# Patient Record
Sex: MALE | Race: WHITE | NOT HISPANIC OR LATINO | URBAN - METROPOLITAN AREA
[De-identification: names, ages, dates, MRNs, and addresses within clinical notes are randomized per-mention and may not be internally consistent; named-entity substitution may affect disease eponyms.]

---

## 2018-04-18 ENCOUNTER — OUTPATIENT (OUTPATIENT)
Dept: OUTPATIENT SERVICES | Facility: HOSPITAL | Age: 83
LOS: 1 days | Discharge: HOME | End: 2018-04-18

## 2018-04-18 DIAGNOSIS — N18.2 CHRONIC KIDNEY DISEASE, STAGE 2 (MILD): ICD-10-CM

## 2018-04-18 DIAGNOSIS — E11.9 TYPE 2 DIABETES MELLITUS WITHOUT COMPLICATIONS: ICD-10-CM

## 2018-04-18 DIAGNOSIS — E55.9 VITAMIN D DEFICIENCY, UNSPECIFIED: ICD-10-CM

## 2018-04-18 DIAGNOSIS — D64.9 ANEMIA, UNSPECIFIED: ICD-10-CM

## 2018-04-18 DIAGNOSIS — N39.0 URINARY TRACT INFECTION, SITE NOT SPECIFIED: ICD-10-CM

## 2018-08-01 ENCOUNTER — EMERGENCY (EMERGENCY)
Facility: HOSPITAL | Age: 83
LOS: 0 days | Discharge: HOME | End: 2018-08-01
Attending: EMERGENCY MEDICINE | Admitting: EMERGENCY MEDICINE
Payer: COMMERCIAL

## 2018-08-01 VITALS
TEMPERATURE: 98 F | HEART RATE: 60 BPM | RESPIRATION RATE: 18 BRPM | DIASTOLIC BLOOD PRESSURE: 74 MMHG | SYSTOLIC BLOOD PRESSURE: 187 MMHG | OXYGEN SATURATION: 98 %

## 2018-08-01 VITALS
OXYGEN SATURATION: 99 % | RESPIRATION RATE: 18 BRPM | SYSTOLIC BLOOD PRESSURE: 164 MMHG | DIASTOLIC BLOOD PRESSURE: 76 MMHG | TEMPERATURE: 99 F | HEART RATE: 60 BPM

## 2018-08-01 DIAGNOSIS — Z96.641 PRESENCE OF RIGHT ARTIFICIAL HIP JOINT: Chronic | ICD-10-CM

## 2018-08-01 DIAGNOSIS — Z79.899 OTHER LONG TERM (CURRENT) DRUG THERAPY: ICD-10-CM

## 2018-08-01 DIAGNOSIS — E11.9 TYPE 2 DIABETES MELLITUS WITHOUT COMPLICATIONS: ICD-10-CM

## 2018-08-01 DIAGNOSIS — Z88.8 ALLERGY STATUS TO OTHER DRUGS, MEDICAMENTS AND BIOLOGICAL SUBSTANCES: ICD-10-CM

## 2018-08-01 DIAGNOSIS — S06.5X9A TRAUMATIC SUBDURAL HEMORRHAGE WITH LOSS OF CONSCIOUSNESS OF UNSPECIFIED DURATION, INITIAL ENCOUNTER: Chronic | ICD-10-CM

## 2018-08-01 DIAGNOSIS — I10 ESSENTIAL (PRIMARY) HYPERTENSION: ICD-10-CM

## 2018-08-01 DIAGNOSIS — M79.662 PAIN IN LEFT LOWER LEG: ICD-10-CM

## 2018-08-01 DIAGNOSIS — R53.1 WEAKNESS: ICD-10-CM

## 2018-08-01 DIAGNOSIS — Z98.890 OTHER SPECIFIED POSTPROCEDURAL STATES: Chronic | ICD-10-CM

## 2018-08-01 DIAGNOSIS — M79.652 PAIN IN LEFT THIGH: ICD-10-CM

## 2018-08-01 DIAGNOSIS — Z79.84 LONG TERM (CURRENT) USE OF ORAL HYPOGLYCEMIC DRUGS: ICD-10-CM

## 2018-08-01 DIAGNOSIS — M79.661 PAIN IN RIGHT LOWER LEG: ICD-10-CM

## 2018-08-01 DIAGNOSIS — Z96.641 PRESENCE OF RIGHT ARTIFICIAL HIP JOINT: ICD-10-CM

## 2018-08-01 DIAGNOSIS — Z98.890 OTHER SPECIFIED POSTPROCEDURAL STATES: ICD-10-CM

## 2018-08-01 LAB
ALBUMIN SERPL ELPH-MCNC: 3.4 G/DL — LOW (ref 3.5–5.2)
ALBUMIN SERPL ELPH-MCNC: 4.4 G/DL — SIGNIFICANT CHANGE UP (ref 3.5–5.2)
ALP SERPL-CCNC: 100 U/L — SIGNIFICANT CHANGE UP (ref 30–115)
ALP SERPL-CCNC: 80 U/L — SIGNIFICANT CHANGE UP (ref 30–115)
ALT FLD-CCNC: 16 U/L — SIGNIFICANT CHANGE UP (ref 0–41)
ALT FLD-CCNC: 21 U/L — SIGNIFICANT CHANGE UP (ref 0–41)
ANION GAP SERPL CALC-SCNC: 10 MMOL/L — SIGNIFICANT CHANGE UP (ref 7–14)
ANION GAP SERPL CALC-SCNC: 15 MMOL/L — HIGH (ref 7–14)
ANION GAP SERPL CALC-SCNC: 16 MMOL/L — HIGH (ref 7–14)
APTT BLD: 23.7 SEC — CRITICAL LOW (ref 27–39.2)
AST SERPL-CCNC: 16 U/L — SIGNIFICANT CHANGE UP (ref 0–41)
AST SERPL-CCNC: 38 U/L — SIGNIFICANT CHANGE UP (ref 0–41)
BASE EXCESS BLDV CALC-SCNC: -9.1 MMOL/L — LOW (ref -2–2)
BASOPHILS # BLD AUTO: 0.03 K/UL — SIGNIFICANT CHANGE UP (ref 0–0.2)
BASOPHILS NFR BLD AUTO: 0.4 % — SIGNIFICANT CHANGE UP (ref 0–1)
BILIRUB SERPL-MCNC: 0.3 MG/DL — SIGNIFICANT CHANGE UP (ref 0.2–1.2)
BILIRUB SERPL-MCNC: <0.2 MG/DL — SIGNIFICANT CHANGE UP (ref 0.2–1.2)
BUN SERPL-MCNC: 11 MG/DL — SIGNIFICANT CHANGE UP (ref 10–20)
BUN SERPL-MCNC: 21 MG/DL — HIGH (ref 10–20)
BUN SERPL-MCNC: 22 MG/DL — HIGH (ref 10–20)
CA-I SERPL-SCNC: 0.82 MMOL/L — LOW (ref 1.12–1.3)
CALCIUM SERPL-MCNC: 3.3 MG/DL — CRITICAL LOW (ref 8.5–10.1)
CALCIUM SERPL-MCNC: 7.8 MG/DL — LOW (ref 8.5–10.1)
CALCIUM SERPL-MCNC: 8.7 MG/DL — SIGNIFICANT CHANGE UP (ref 8.5–10.1)
CHLORIDE SERPL-SCNC: 103 MMOL/L — SIGNIFICANT CHANGE UP (ref 98–110)
CHLORIDE SERPL-SCNC: 122 MMOL/L — HIGH (ref 98–110)
CHLORIDE SERPL-SCNC: 99 MMOL/L — SIGNIFICANT CHANGE UP (ref 98–110)
CO2 SERPL-SCNC: 11 MMOL/L — LOW (ref 17–32)
CO2 SERPL-SCNC: 19 MMOL/L — SIGNIFICANT CHANGE UP (ref 17–32)
CO2 SERPL-SCNC: 22 MMOL/L — SIGNIFICANT CHANGE UP (ref 17–32)
CREAT SERPL-MCNC: 0.8 MG/DL — SIGNIFICANT CHANGE UP (ref 0.7–1.5)
CREAT SERPL-MCNC: 1 MG/DL — SIGNIFICANT CHANGE UP (ref 0.7–1.5)
CREAT SERPL-MCNC: <0.5 MG/DL — LOW (ref 0.7–1.5)
D DIMER BLD IA.RAPID-MCNC: 347 NG/ML DDU — HIGH (ref 0–230)
EOSINOPHIL # BLD AUTO: 0.04 K/UL — SIGNIFICANT CHANGE UP (ref 0–0.7)
EOSINOPHIL NFR BLD AUTO: 0.6 % — SIGNIFICANT CHANGE UP (ref 0–8)
GAS PNL BLDA: SIGNIFICANT CHANGE UP
GAS PNL BLDV: 143 MMOL/L — SIGNIFICANT CHANGE UP (ref 136–145)
GAS PNL BLDV: SIGNIFICANT CHANGE UP
GLUCOSE SERPL-MCNC: 135 MG/DL — HIGH (ref 70–99)
GLUCOSE SERPL-MCNC: 150 MG/DL — HIGH (ref 70–99)
GLUCOSE SERPL-MCNC: 68 MG/DL — LOW (ref 70–99)
HCO3 BLDV-SCNC: 16 MMOL/L — LOW (ref 22–29)
HCT VFR BLD CALC: 39.2 % — LOW (ref 42–52)
HCT VFR BLDA CALC: 25.4 % — LOW (ref 34–44)
HGB BLD CALC-MCNC: 8.3 G/DL — LOW (ref 14–18)
HGB BLD-MCNC: 13.2 G/DL — LOW (ref 14–18)
IMM GRANULOCYTES NFR BLD AUTO: 1.2 % — HIGH (ref 0.1–0.3)
INR BLD: 1.07 RATIO — SIGNIFICANT CHANGE UP (ref 0.65–1.3)
LACTATE BLDV-MCNC: 1.3 MMOL/L — SIGNIFICANT CHANGE UP (ref 0.5–1.6)
LYMPHOCYTES # BLD AUTO: 1.52 K/UL — SIGNIFICANT CHANGE UP (ref 1.2–3.4)
LYMPHOCYTES # BLD AUTO: 22.5 % — SIGNIFICANT CHANGE UP (ref 20.5–51.1)
MAGNESIUM SERPL-MCNC: 1.9 MG/DL — SIGNIFICANT CHANGE UP (ref 1.8–2.4)
MCHC RBC-ENTMCNC: 32 PG — HIGH (ref 27–31)
MCHC RBC-ENTMCNC: 33.7 G/DL — SIGNIFICANT CHANGE UP (ref 32–37)
MCV RBC AUTO: 95.1 FL — HIGH (ref 80–94)
MONOCYTES # BLD AUTO: 0.76 K/UL — HIGH (ref 0.1–0.6)
MONOCYTES NFR BLD AUTO: 11.2 % — HIGH (ref 1.7–9.3)
NEUTROPHILS # BLD AUTO: 4.34 K/UL — SIGNIFICANT CHANGE UP (ref 1.4–6.5)
NEUTROPHILS NFR BLD AUTO: 64.1 % — SIGNIFICANT CHANGE UP (ref 42.2–75.2)
NRBC # BLD: 0 /100 WBCS — SIGNIFICANT CHANGE UP (ref 0–0)
PCO2 BLDV: 30 MMHG — LOW (ref 41–51)
PH BLDV: 7.33 — SIGNIFICANT CHANGE UP (ref 7.26–7.43)
PLATELET # BLD AUTO: 165 K/UL — SIGNIFICANT CHANGE UP (ref 130–400)
PO2 BLDV: 23 MMHG — SIGNIFICANT CHANGE UP (ref 20–40)
POTASSIUM BLDV-SCNC: 2.7 MMOL/L — LOW (ref 3.3–5.6)
POTASSIUM SERPL-MCNC: 2.3 MMOL/L — CRITICAL LOW (ref 3.5–5)
POTASSIUM SERPL-MCNC: 4.6 MMOL/L — SIGNIFICANT CHANGE UP (ref 3.5–5)
POTASSIUM SERPL-MCNC: 6.2 MMOL/L — CRITICAL HIGH (ref 3.5–5)
POTASSIUM SERPL-SCNC: 2.3 MMOL/L — CRITICAL LOW (ref 3.5–5)
POTASSIUM SERPL-SCNC: 4.6 MMOL/L — SIGNIFICANT CHANGE UP (ref 3.5–5)
POTASSIUM SERPL-SCNC: 6.2 MMOL/L — CRITICAL HIGH (ref 3.5–5)
PROT SERPL-MCNC: 5.5 G/DL — LOW (ref 6–8)
PROT SERPL-MCNC: 7.3 G/DL — SIGNIFICANT CHANGE UP (ref 6–8)
PROTHROM AB SERPL-ACNC: 11.5 SEC — SIGNIFICANT CHANGE UP (ref 9.95–12.87)
RBC # BLD: 4.12 M/UL — LOW (ref 4.7–6.1)
RBC # FLD: 12.6 % — SIGNIFICANT CHANGE UP (ref 11.5–14.5)
SAO2 % BLDV: 32 % — SIGNIFICANT CHANGE UP
SODIUM SERPL-SCNC: 137 MMOL/L — SIGNIFICANT CHANGE UP (ref 135–146)
SODIUM SERPL-SCNC: 137 MMOL/L — SIGNIFICANT CHANGE UP (ref 135–146)
SODIUM SERPL-SCNC: 143 MMOL/L — SIGNIFICANT CHANGE UP (ref 135–146)
WBC # BLD: 6.77 K/UL — SIGNIFICANT CHANGE UP (ref 4.8–10.8)
WBC # FLD AUTO: 6.77 K/UL — SIGNIFICANT CHANGE UP (ref 4.8–10.8)

## 2018-08-01 PROCEDURE — 93970 EXTREMITY STUDY: CPT | Mod: 26

## 2018-08-01 RX ORDER — POTASSIUM CHLORIDE 20 MEQ
40 PACKET (EA) ORAL ONCE
Qty: 0 | Refills: 0 | Status: COMPLETED | OUTPATIENT
Start: 2018-08-01 | End: 2018-08-01

## 2018-08-01 RX ORDER — CARBAMAZEPINE 200 MG
0 TABLET ORAL
Qty: 0 | Refills: 0 | COMMUNITY

## 2018-08-01 RX ORDER — ATORVASTATIN CALCIUM 80 MG/1
1 TABLET, FILM COATED ORAL
Qty: 0 | Refills: 0 | COMMUNITY

## 2018-08-01 RX ORDER — SITAGLIPTIN 50 MG/1
0 TABLET, FILM COATED ORAL
Qty: 0 | Refills: 0 | COMMUNITY

## 2018-08-01 RX ORDER — POTASSIUM CHLORIDE 20 MEQ
20 PACKET (EA) ORAL ONCE
Qty: 0 | Refills: 0 | Status: DISCONTINUED | OUTPATIENT
Start: 2018-08-01 | End: 2018-08-01

## 2018-08-01 RX ORDER — CALCIUM GLUCONATE 100 MG/ML
2 VIAL (ML) INTRAVENOUS ONCE
Qty: 0 | Refills: 0 | Status: DISCONTINUED | OUTPATIENT
Start: 2018-08-01 | End: 2018-08-01

## 2018-08-01 RX ORDER — SODIUM CHLORIDE 9 MG/ML
1000 INJECTION INTRAMUSCULAR; INTRAVENOUS; SUBCUTANEOUS ONCE
Qty: 0 | Refills: 0 | Status: COMPLETED | OUTPATIENT
Start: 2018-08-01 | End: 2018-08-01

## 2018-08-01 RX ORDER — DIVALPROEX SODIUM 500 MG/1
0 TABLET, DELAYED RELEASE ORAL
Qty: 0 | Refills: 0 | COMMUNITY

## 2018-08-01 RX ORDER — CLOPIDOGREL BISULFATE 75 MG/1
1 TABLET, FILM COATED ORAL
Qty: 0 | Refills: 0 | COMMUNITY

## 2018-08-01 RX ORDER — MORPHINE SULFATE 50 MG/1
4 CAPSULE, EXTENDED RELEASE ORAL ONCE
Qty: 0 | Refills: 0 | Status: DISCONTINUED | OUTPATIENT
Start: 2018-08-01 | End: 2018-08-01

## 2018-08-01 RX ORDER — LISINOPRIL 2.5 MG/1
1 TABLET ORAL
Qty: 0 | Refills: 0 | COMMUNITY

## 2018-08-01 RX ORDER — ACETAMINOPHEN 500 MG
650 TABLET ORAL ONCE
Qty: 0 | Refills: 0 | Status: COMPLETED | OUTPATIENT
Start: 2018-08-01 | End: 2018-08-01

## 2018-08-01 RX ADMIN — SODIUM CHLORIDE 1000 MILLILITER(S): 9 INJECTION INTRAMUSCULAR; INTRAVENOUS; SUBCUTANEOUS at 01:45

## 2018-08-01 RX ADMIN — Medication 650 MILLIGRAM(S): at 10:38

## 2018-08-01 RX ADMIN — Medication 40 MILLIEQUIVALENT(S): at 04:53

## 2018-08-01 RX ADMIN — SODIUM CHLORIDE 1000 MILLILITER(S): 9 INJECTION INTRAMUSCULAR; INTRAVENOUS; SUBCUTANEOUS at 04:00

## 2018-08-01 NOTE — ED PROVIDER NOTE - NS ED ROS FT
Review of Systems:  	•	CONSTITUTIONAL - no fever, no diaphoresis, no chills  	•	SKIN - no rash  	•	HEMATOLOGIC - no bleeding, no bruising  	•	EYES - no eye pain, no blurry vision  	•	ENT - no congestion  	•	RESPIRATORY - no shortness of breath  	•	CARDIAC - no chest pain, no palpitations  	•	GI - no abd pain, no nausea, no vomiting  	•	GENITO-URINARY - no dysuria; no hematuria, no increased urinary frequency  	•	MUSCULOSKELETAL - + joint paint, no swelling, no redness  	•	NEUROLOGIC - no weakness, no headache, no paresthesias, no LOC

## 2018-08-01 NOTE — ED ADULT NURSE NOTE - PSH
H/O hernia repair Acute subdural hematoma  With surgical intervention  H/O hernia repair    History of total hip replacement, right

## 2018-08-01 NOTE — ED PROVIDER NOTE - ATTENDING CONTRIBUTION TO CARE
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 Reports yesterday had RLE pain, now LLE, upper thigh and also calf. No recent falls, rash, prolonged immobilization. Son reports history of clot though unclear if this is the case.     VS normal, exam without focal neuro deficit, ambulating with assistance at baseline, no signficant calf swelling but has subjective tenderness.     Will check labs including d dimer, give analgesia and reasess.

## 2018-08-01 NOTE — ED PROVIDER NOTE - PROGRESS NOTE DETAILS
Pt initially with hemolyzed K, repeat with hypokalemia, received PO K however after other labs resulted, noted that these did not seem appropriate so new labs drawn via arterial puncture, without hypokalemia, hypocalcemia. After tylenol, leg pain improved, however d dimer positive, will need LE duplex.    Clinically picture not entirely suggestive of DVT however patient's son initially reported history of DVT, though patient himself now denies. pts son refused morphine for him, given tylenol instead Case endorsed to Dr. Stevenson as per vascular tech, prelim negative for DVT

## 2018-08-01 NOTE — ED ADULT NURSE NOTE - PMH
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

## 2018-08-01 NOTE — ED PROVIDER NOTE - PSH
Acute subdural hematoma  With surgical intervention  H/O hernia repair    History of total hip replacement, right

## 2018-08-01 NOTE — ED PROVIDER NOTE - PHYSICAL EXAMINATION
VITAL SIGNS: I have reviewed nursing notes and confirm.  CONSTITUTIONAL: Well-developed; well-nourished; in no acute distress.  SKIN: Skin exam is warm and dry, no acute rash.  HEAD: Normocephalic; atraumatic.  EYES: PERRL, EOM intact; conjunctiva and sclera clear.  ENT: No nasal discharge; airway clear.   NECK: Supple; non tender.  CARD: S1, S2 normal; no murmurs, gallops, or rubs. Regular rate and rhythm. Capillary refill <2 sec.   RESP: Clear to auscultation bilaterally. No wheezes, rales or rhonchi.  ABD: Normal bowel sounds; soft; non-distended; non-tender.   EXT: Normal ROM. No edema. No calf tenderness. +Tenderness to left femur.   LYMPH: No acute cervical adenopathy.  NEURO: AAOx3. CN 2-12 grossly intact. +Contracted RUE (chronic). +Motor strength 5/5 bilateral LE. Motor strength 3/5 RUE. Motor strength 5/5 LUE. Radial pulses 2+ bilaterally. Dorsalis pedis pulses 2+ bilaterally.   PSYCH: Cooperative, appropriate.

## 2018-08-01 NOTE — ED ADULT NURSE NOTE - NSIMPLEMENTINTERV_GEN_ALL_ED
Implemented All Fall with Harm Risk Interventions:  [place] to call system. Call bell, personal items and telephone within reach. Instruct patient to call for assistance. Room bathroom lighting operational. Non-slip footwear when patient is off stretcher. Physically safe environment: no spills, clutter or unnecessary equipment. Stretcher in lowest position, wheels locked, appropriate side rails in place. Provide visual cue, wrist band, yellow gown, etc. Monitor gait and stability. Monitor for mental status changes and reorient to person, place, and time. Review medications for side effects contributing to fall risk. Reinforce activity limits and safety measures with patient and family. Provide visual clues: red socks.

## 2018-08-01 NOTE — ED PROVIDER NOTE - OBJECTIVE STATEMENT
87 yo M with pmhx of CVA, DM, SDH, epilepsy, HTN presenting with bilateral LE pain since last night. Patient states pain started at RLE and now is experiencing pain to LLE. Pain is sharp and intermittent and radiates to lower leg. Pt normally ambulates with walker with assistance - has 24hr home health aid. Patient has been taking Advil for pain. No trauma. No warmth, swelling or redness. No cp, sob, fever, chills, abdominal pain, nausea, vomiting, diarrhea, back pain, urinary symptoms, headache, dizziness, paresthesias, or weakness.

## 2018-08-01 NOTE — ED ADULT NURSE NOTE - OBJECTIVE STATEMENT
Patient reports having right upper leg pain the night before, and left upper leg pain in the morning, limiting ability to walk, denies any injuries.

## 2019-05-23 ENCOUNTER — OUTPATIENT (OUTPATIENT)
Dept: OUTPATIENT SERVICES | Facility: HOSPITAL | Age: 84
LOS: 1 days | Discharge: HOME | End: 2019-05-23

## 2019-05-23 DIAGNOSIS — Z98.890 OTHER SPECIFIED POSTPROCEDURAL STATES: Chronic | ICD-10-CM

## 2019-05-23 DIAGNOSIS — Z02.9 ENCOUNTER FOR ADMINISTRATIVE EXAMINATIONS, UNSPECIFIED: ICD-10-CM

## 2019-05-23 DIAGNOSIS — S06.5X9A TRAUMATIC SUBDURAL HEMORRHAGE WITH LOSS OF CONSCIOUSNESS OF UNSPECIFIED DURATION, INITIAL ENCOUNTER: Chronic | ICD-10-CM

## 2019-05-23 DIAGNOSIS — M79.652 PAIN IN LEFT THIGH: ICD-10-CM

## 2019-05-23 DIAGNOSIS — Z96.641 PRESENCE OF RIGHT ARTIFICIAL HIP JOINT: Chronic | ICD-10-CM

## 2019-05-23 DIAGNOSIS — I10 ESSENTIAL (PRIMARY) HYPERTENSION: ICD-10-CM

## 2019-05-23 DIAGNOSIS — R53.1 WEAKNESS: ICD-10-CM

## 2019-05-23 PROBLEM — Q89.9 CONGENITAL MALFORMATION, UNSPECIFIED: Chronic | Status: ACTIVE | Noted: 2018-08-01

## 2019-05-23 PROBLEM — E11.9 TYPE 2 DIABETES MELLITUS WITHOUT COMPLICATIONS: Chronic | Status: ACTIVE | Noted: 2018-08-01

## 2019-05-23 PROBLEM — R56.9 UNSPECIFIED CONVULSIONS: Chronic | Status: ACTIVE | Noted: 2018-08-01

## 2019-05-23 PROBLEM — I63.9 CEREBRAL INFARCTION, UNSPECIFIED: Chronic | Status: ACTIVE | Noted: 2018-08-01

## 2019-05-24 ENCOUNTER — OUTPATIENT (OUTPATIENT)
Dept: OUTPATIENT SERVICES | Facility: HOSPITAL | Age: 84
LOS: 1 days | Discharge: HOME | End: 2019-05-24

## 2019-05-24 DIAGNOSIS — S06.5X9A TRAUMATIC SUBDURAL HEMORRHAGE WITH LOSS OF CONSCIOUSNESS OF UNSPECIFIED DURATION, INITIAL ENCOUNTER: Chronic | ICD-10-CM

## 2019-05-24 DIAGNOSIS — Z98.890 OTHER SPECIFIED POSTPROCEDURAL STATES: Chronic | ICD-10-CM

## 2019-05-24 DIAGNOSIS — I10 ESSENTIAL (PRIMARY) HYPERTENSION: ICD-10-CM

## 2019-05-24 DIAGNOSIS — R53.1 WEAKNESS: ICD-10-CM

## 2019-05-24 DIAGNOSIS — Z96.641 PRESENCE OF RIGHT ARTIFICIAL HIP JOINT: Chronic | ICD-10-CM

## 2019-06-24 ENCOUNTER — OUTPATIENT (OUTPATIENT)
Dept: OUTPATIENT SERVICES | Facility: HOSPITAL | Age: 84
LOS: 1 days | Discharge: HOME | End: 2019-06-24

## 2019-06-24 DIAGNOSIS — D50.0 IRON DEFICIENCY ANEMIA SECONDARY TO BLOOD LOSS (CHRONIC): ICD-10-CM

## 2019-06-24 DIAGNOSIS — S06.5X9A TRAUMATIC SUBDURAL HEMORRHAGE WITH LOSS OF CONSCIOUSNESS OF UNSPECIFIED DURATION, INITIAL ENCOUNTER: Chronic | ICD-10-CM

## 2019-06-24 DIAGNOSIS — D51.9 VITAMIN B12 DEFICIENCY ANEMIA, UNSPECIFIED: ICD-10-CM

## 2019-06-24 DIAGNOSIS — Z98.890 OTHER SPECIFIED POSTPROCEDURAL STATES: Chronic | ICD-10-CM

## 2019-06-24 DIAGNOSIS — Z96.641 PRESENCE OF RIGHT ARTIFICIAL HIP JOINT: Chronic | ICD-10-CM

## 2019-06-24 DIAGNOSIS — E61.1 IRON DEFICIENCY: ICD-10-CM

## 2019-06-24 DIAGNOSIS — D64.9 ANEMIA, UNSPECIFIED: ICD-10-CM

## 2021-01-01 ENCOUNTER — INPATIENT (INPATIENT)
Facility: HOSPITAL | Age: 86
LOS: 5 days | Discharge: HOME HEALTH | End: 2021-03-21
Attending: INTERNAL MEDICINE | Admitting: INTERNAL MEDICINE
Payer: MEDICARE

## 2021-01-01 ENCOUNTER — INPATIENT (INPATIENT)
Facility: HOSPITAL | Age: 86
LOS: 11 days | End: 2021-04-02
Attending: HOSPITALIST | Admitting: HOSPITALIST
Payer: MEDICARE

## 2021-01-01 VITALS
RESPIRATION RATE: 20 BRPM | DIASTOLIC BLOOD PRESSURE: 63 MMHG | SYSTOLIC BLOOD PRESSURE: 147 MMHG | HEIGHT: 65 IN | TEMPERATURE: 101 F | OXYGEN SATURATION: 92 % | HEART RATE: 85 BPM

## 2021-01-01 VITALS — HEART RATE: 116 BPM | OXYGEN SATURATION: 95 %

## 2021-01-01 VITALS
HEIGHT: 65 IN | DIASTOLIC BLOOD PRESSURE: 65 MMHG | SYSTOLIC BLOOD PRESSURE: 128 MMHG | HEART RATE: 117 BPM | RESPIRATION RATE: 26 BRPM | OXYGEN SATURATION: 81 %

## 2021-01-01 VITALS
SYSTOLIC BLOOD PRESSURE: 148 MMHG | TEMPERATURE: 101 F | OXYGEN SATURATION: 97 % | DIASTOLIC BLOOD PRESSURE: 72 MMHG | HEART RATE: 76 BPM

## 2021-01-01 DIAGNOSIS — R09.02 HYPOXEMIA: ICD-10-CM

## 2021-01-01 DIAGNOSIS — I69.351 HEMIPLEGIA AND HEMIPARESIS FOLLOWING CEREBRAL INFARCTION AFFECTING RIGHT DOMINANT SIDE: ICD-10-CM

## 2021-01-01 DIAGNOSIS — I10 ESSENTIAL (PRIMARY) HYPERTENSION: ICD-10-CM

## 2021-01-01 DIAGNOSIS — G93.41 METABOLIC ENCEPHALOPATHY: ICD-10-CM

## 2021-01-01 DIAGNOSIS — Z74.01 BED CONFINEMENT STATUS: ICD-10-CM

## 2021-01-01 DIAGNOSIS — J96.01 ACUTE RESPIRATORY FAILURE WITH HYPOXIA: ICD-10-CM

## 2021-01-01 DIAGNOSIS — D64.9 ANEMIA, UNSPECIFIED: ICD-10-CM

## 2021-01-01 DIAGNOSIS — Z98.890 OTHER SPECIFIED POSTPROCEDURAL STATES: Chronic | ICD-10-CM

## 2021-01-01 DIAGNOSIS — R45.1 RESTLESSNESS AND AGITATION: ICD-10-CM

## 2021-01-01 DIAGNOSIS — A41.9 SEPSIS, UNSPECIFIED ORGANISM: ICD-10-CM

## 2021-01-01 DIAGNOSIS — R32 UNSPECIFIED URINARY INCONTINENCE: ICD-10-CM

## 2021-01-01 DIAGNOSIS — A41.51 SEPSIS DUE TO ESCHERICHIA COLI [E. COLI]: ICD-10-CM

## 2021-01-01 DIAGNOSIS — S06.5X9A TRAUMATIC SUBDURAL HEMORRHAGE WITH LOSS OF CONSCIOUSNESS OF UNSPECIFIED DURATION, INITIAL ENCOUNTER: Chronic | ICD-10-CM

## 2021-01-01 DIAGNOSIS — J12.82 PNEUMONIA DUE TO CORONAVIRUS DISEASE 2019: ICD-10-CM

## 2021-01-01 DIAGNOSIS — R06.00 DYSPNEA, UNSPECIFIED: ICD-10-CM

## 2021-01-01 DIAGNOSIS — Z79.84 LONG TERM (CURRENT) USE OF ORAL HYPOGLYCEMIC DRUGS: ICD-10-CM

## 2021-01-01 DIAGNOSIS — Z96.641 PRESENCE OF RIGHT ARTIFICIAL HIP JOINT: Chronic | ICD-10-CM

## 2021-01-01 DIAGNOSIS — U07.1 COVID-19: ICD-10-CM

## 2021-01-01 DIAGNOSIS — Z79.899 OTHER LONG TERM (CURRENT) DRUG THERAPY: ICD-10-CM

## 2021-01-01 DIAGNOSIS — Q68.8 OTHER SPECIFIED CONGENITAL MUSCULOSKELETAL DEFORMITIES: ICD-10-CM

## 2021-01-01 DIAGNOSIS — G40.909 EPILEPSY, UNSPECIFIED, NOT INTRACTABLE, WITHOUT STATUS EPILEPTICUS: ICD-10-CM

## 2021-01-01 DIAGNOSIS — N12 TUBULO-INTERSTITIAL NEPHRITIS, NOT SPECIFIED AS ACUTE OR CHRONIC: ICD-10-CM

## 2021-01-01 DIAGNOSIS — E11.9 TYPE 2 DIABETES MELLITUS WITHOUT COMPLICATIONS: ICD-10-CM

## 2021-01-01 DIAGNOSIS — R41.82 ALTERED MENTAL STATUS, UNSPECIFIED: ICD-10-CM

## 2021-01-01 DIAGNOSIS — R77.8 OTHER SPECIFIED ABNORMALITIES OF PLASMA PROTEINS: ICD-10-CM

## 2021-01-01 DIAGNOSIS — Z51.5 ENCOUNTER FOR PALLIATIVE CARE: ICD-10-CM

## 2021-01-01 DIAGNOSIS — E78.5 HYPERLIPIDEMIA, UNSPECIFIED: ICD-10-CM

## 2021-01-01 LAB
-  AMIKACIN: SIGNIFICANT CHANGE UP
-  AMOXICILLIN/CLAVULANIC ACID: SIGNIFICANT CHANGE UP
-  AMPICILLIN/SULBACTAM: SIGNIFICANT CHANGE UP
-  AMPICILLIN: SIGNIFICANT CHANGE UP
-  AZTREONAM: SIGNIFICANT CHANGE UP
-  CEFAZOLIN: SIGNIFICANT CHANGE UP
-  CEFEPIME: SIGNIFICANT CHANGE UP
-  CEFOXITIN: SIGNIFICANT CHANGE UP
-  CEFTRIAXONE: SIGNIFICANT CHANGE UP
-  CIPROFLOXACIN: SIGNIFICANT CHANGE UP
-  ERTAPENEM: SIGNIFICANT CHANGE UP
-  GENTAMICIN: SIGNIFICANT CHANGE UP
-  IMIPENEM: SIGNIFICANT CHANGE UP
-  LEVOFLOXACIN: SIGNIFICANT CHANGE UP
-  MEROPENEM: SIGNIFICANT CHANGE UP
-  NITROFURANTOIN: SIGNIFICANT CHANGE UP
-  PIPERACILLIN/TAZOBACTAM: SIGNIFICANT CHANGE UP
-  STAPHYLOCOCCUS EPIDERMIDIS, METHICILLIN RESISTANT: SIGNIFICANT CHANGE UP
-  TIGECYCLINE: SIGNIFICANT CHANGE UP
-  TOBRAMYCIN: SIGNIFICANT CHANGE UP
-  TRIMETHOPRIM/SULFAMETHOXAZOLE: SIGNIFICANT CHANGE UP
A1C WITH ESTIMATED AVERAGE GLUCOSE RESULT: 7.7 % — HIGH (ref 4–5.6)
ALBUMIN SERPL ELPH-MCNC: 1.9 G/DL — LOW (ref 3.5–5.2)
ALBUMIN SERPL ELPH-MCNC: 2 G/DL — LOW (ref 3.5–5.2)
ALBUMIN SERPL ELPH-MCNC: 2.1 G/DL — LOW (ref 3.5–5.2)
ALBUMIN SERPL ELPH-MCNC: 2.3 G/DL — LOW (ref 3.5–5.2)
ALBUMIN SERPL ELPH-MCNC: 2.4 G/DL — LOW (ref 3.5–5.2)
ALBUMIN SERPL ELPH-MCNC: 2.6 G/DL — LOW (ref 3.5–5.2)
ALBUMIN SERPL ELPH-MCNC: 3.7 G/DL — SIGNIFICANT CHANGE UP (ref 3.5–5.2)
ALP SERPL-CCNC: 115 U/L — SIGNIFICANT CHANGE UP (ref 30–115)
ALP SERPL-CCNC: 131 U/L — HIGH (ref 30–115)
ALP SERPL-CCNC: 149 U/L — HIGH (ref 30–115)
ALP SERPL-CCNC: 163 U/L — HIGH (ref 30–115)
ALP SERPL-CCNC: 297 U/L — HIGH (ref 30–115)
ALP SERPL-CCNC: 337 U/L — HIGH (ref 30–115)
ALP SERPL-CCNC: 353 U/L — HIGH (ref 30–115)
ALP SERPL-CCNC: 49 U/L — SIGNIFICANT CHANGE UP (ref 30–115)
ALP SERPL-CCNC: 52 U/L — SIGNIFICANT CHANGE UP (ref 30–115)
ALP SERPL-CCNC: 68 U/L — SIGNIFICANT CHANGE UP (ref 30–115)
ALP SERPL-CCNC: 83 U/L — SIGNIFICANT CHANGE UP (ref 30–115)
ALT FLD-CCNC: 15 U/L — SIGNIFICANT CHANGE UP (ref 0–41)
ALT FLD-CCNC: 16 U/L — SIGNIFICANT CHANGE UP (ref 0–41)
ALT FLD-CCNC: 17 U/L — SIGNIFICANT CHANGE UP (ref 0–41)
ALT FLD-CCNC: 17 U/L — SIGNIFICANT CHANGE UP (ref 0–41)
ALT FLD-CCNC: 21 U/L — SIGNIFICANT CHANGE UP (ref 0–41)
ALT FLD-CCNC: 23 U/L — SIGNIFICANT CHANGE UP (ref 0–41)
ALT FLD-CCNC: 24 U/L — SIGNIFICANT CHANGE UP (ref 0–41)
ALT FLD-CCNC: 26 U/L — SIGNIFICANT CHANGE UP (ref 0–41)
ALT FLD-CCNC: 27 U/L — SIGNIFICANT CHANGE UP (ref 0–41)
ALT FLD-CCNC: 28 U/L — SIGNIFICANT CHANGE UP (ref 0–41)
ALT FLD-CCNC: 36 U/L — SIGNIFICANT CHANGE UP (ref 0–41)
AMMONIA BLD-MCNC: 49 UMOL/L — SIGNIFICANT CHANGE UP (ref 11–55)
ANION GAP SERPL CALC-SCNC: 12 MMOL/L — SIGNIFICANT CHANGE UP (ref 7–14)
ANION GAP SERPL CALC-SCNC: 13 MMOL/L — SIGNIFICANT CHANGE UP (ref 7–14)
ANION GAP SERPL CALC-SCNC: 13 MMOL/L — SIGNIFICANT CHANGE UP (ref 7–14)
ANION GAP SERPL CALC-SCNC: 15 MMOL/L — HIGH (ref 7–14)
ANION GAP SERPL CALC-SCNC: 16 MMOL/L — HIGH (ref 7–14)
ANION GAP SERPL CALC-SCNC: 17 MMOL/L — HIGH (ref 7–14)
ANISOCYTOSIS BLD QL: SLIGHT — SIGNIFICANT CHANGE UP
APPEARANCE UR: ABNORMAL
APPEARANCE UR: CLEAR — SIGNIFICANT CHANGE UP
APTT BLD: 30 SEC — SIGNIFICANT CHANGE UP (ref 27–39.2)
APTT BLD: 37.1 SEC — SIGNIFICANT CHANGE UP (ref 27–39.2)
AST SERPL-CCNC: 34 U/L — SIGNIFICANT CHANGE UP (ref 0–41)
AST SERPL-CCNC: 39 U/L — SIGNIFICANT CHANGE UP (ref 0–41)
AST SERPL-CCNC: 40 U/L — SIGNIFICANT CHANGE UP (ref 0–41)
AST SERPL-CCNC: 41 U/L — SIGNIFICANT CHANGE UP (ref 0–41)
AST SERPL-CCNC: 43 U/L — HIGH (ref 0–41)
AST SERPL-CCNC: 45 U/L — HIGH (ref 0–41)
AST SERPL-CCNC: 49 U/L — HIGH (ref 0–41)
AST SERPL-CCNC: 59 U/L — HIGH (ref 0–41)
AST SERPL-CCNC: 70 U/L — HIGH (ref 0–41)
AST SERPL-CCNC: 77 U/L — HIGH (ref 0–41)
AST SERPL-CCNC: 82 U/L — HIGH (ref 0–41)
BACTERIA # UR AUTO: ABNORMAL
BACTERIA # UR AUTO: NEGATIVE — SIGNIFICANT CHANGE UP
BASE EXCESS BLDV CALC-SCNC: -1.9 MMOL/L — SIGNIFICANT CHANGE UP (ref -2–2)
BASOPHILS # BLD AUTO: 0 K/UL — SIGNIFICANT CHANGE UP (ref 0–0.2)
BASOPHILS # BLD AUTO: 0.01 K/UL — SIGNIFICANT CHANGE UP (ref 0–0.2)
BASOPHILS # BLD AUTO: 0.02 K/UL — SIGNIFICANT CHANGE UP (ref 0–0.2)
BASOPHILS # BLD AUTO: 0.05 K/UL — SIGNIFICANT CHANGE UP (ref 0–0.2)
BASOPHILS NFR BLD AUTO: 0 % — SIGNIFICANT CHANGE UP (ref 0–1)
BASOPHILS NFR BLD AUTO: 0.1 % — SIGNIFICANT CHANGE UP (ref 0–1)
BASOPHILS NFR BLD AUTO: 0.2 % — SIGNIFICANT CHANGE UP (ref 0–1)
BASOPHILS NFR BLD AUTO: 0.9 % — SIGNIFICANT CHANGE UP (ref 0–1)
BILIRUB SERPL-MCNC: 0.3 MG/DL — SIGNIFICANT CHANGE UP (ref 0.2–1.2)
BILIRUB SERPL-MCNC: 0.5 MG/DL — SIGNIFICANT CHANGE UP (ref 0.2–1.2)
BILIRUB SERPL-MCNC: 0.6 MG/DL — SIGNIFICANT CHANGE UP (ref 0.2–1.2)
BILIRUB SERPL-MCNC: 0.7 MG/DL — SIGNIFICANT CHANGE UP (ref 0.2–1.2)
BILIRUB SERPL-MCNC: 0.7 MG/DL — SIGNIFICANT CHANGE UP (ref 0.2–1.2)
BILIRUB SERPL-MCNC: 0.8 MG/DL — SIGNIFICANT CHANGE UP (ref 0.2–1.2)
BILIRUB SERPL-MCNC: <0.2 MG/DL — SIGNIFICANT CHANGE UP (ref 0.2–1.2)
BILIRUB UR-MCNC: NEGATIVE — SIGNIFICANT CHANGE UP
BILIRUB UR-MCNC: NEGATIVE — SIGNIFICANT CHANGE UP
BLD GP AB SCN SERPL QL: SIGNIFICANT CHANGE UP
BLD GP AB SCN SERPL QL: SIGNIFICANT CHANGE UP
BUN SERPL-MCNC: 23 MG/DL — HIGH (ref 10–20)
BUN SERPL-MCNC: 24 MG/DL — HIGH (ref 10–20)
BUN SERPL-MCNC: 24 MG/DL — HIGH (ref 10–20)
BUN SERPL-MCNC: 30 MG/DL — HIGH (ref 10–20)
BUN SERPL-MCNC: 31 MG/DL — HIGH (ref 10–20)
BUN SERPL-MCNC: 34 MG/DL — HIGH (ref 10–20)
BUN SERPL-MCNC: 35 MG/DL — HIGH (ref 10–20)
BUN SERPL-MCNC: 55 MG/DL — HIGH (ref 10–20)
BUN SERPL-MCNC: 62 MG/DL — CRITICAL HIGH (ref 10–20)
BUN SERPL-MCNC: 68 MG/DL — CRITICAL HIGH (ref 10–20)
BUN SERPL-MCNC: 69 MG/DL — CRITICAL HIGH (ref 10–20)
CA-I SERPL-SCNC: 1.12 MMOL/L — SIGNIFICANT CHANGE UP (ref 1.12–1.3)
CALCIUM SERPL-MCNC: 7.2 MG/DL — LOW (ref 8.5–10.1)
CALCIUM SERPL-MCNC: 7.3 MG/DL — LOW (ref 8.5–10.1)
CALCIUM SERPL-MCNC: 7.4 MG/DL — LOW (ref 8.5–10.1)
CALCIUM SERPL-MCNC: 7.4 MG/DL — LOW (ref 8.5–10.1)
CALCIUM SERPL-MCNC: 7.5 MG/DL — LOW (ref 8.5–10.1)
CALCIUM SERPL-MCNC: 7.5 MG/DL — LOW (ref 8.5–10.1)
CALCIUM SERPL-MCNC: 7.6 MG/DL — LOW (ref 8.5–10.1)
CALCIUM SERPL-MCNC: 7.6 MG/DL — LOW (ref 8.5–10.1)
CALCIUM SERPL-MCNC: 7.7 MG/DL — LOW (ref 8.5–10.1)
CALCIUM SERPL-MCNC: 7.7 MG/DL — LOW (ref 8.5–10.1)
CALCIUM SERPL-MCNC: 8.9 MG/DL — SIGNIFICANT CHANGE UP (ref 8.5–10.1)
CARBAMAZEPINE SERPL-MCNC: 10.9 UG/ML — SIGNIFICANT CHANGE UP (ref 4–12)
CARBAMAZEPINE SERPL-MCNC: 6.9 UG/ML — SIGNIFICANT CHANGE UP (ref 4–12)
CHLORIDE SERPL-SCNC: 106 MMOL/L — SIGNIFICANT CHANGE UP (ref 98–110)
CHLORIDE SERPL-SCNC: 108 MMOL/L — SIGNIFICANT CHANGE UP (ref 98–110)
CHLORIDE SERPL-SCNC: 108 MMOL/L — SIGNIFICANT CHANGE UP (ref 98–110)
CHLORIDE SERPL-SCNC: 109 MMOL/L — SIGNIFICANT CHANGE UP (ref 98–110)
CHLORIDE SERPL-SCNC: 112 MMOL/L — HIGH (ref 98–110)
CHLORIDE SERPL-SCNC: 114 MMOL/L — HIGH (ref 98–110)
CHLORIDE SERPL-SCNC: 114 MMOL/L — HIGH (ref 98–110)
CHLORIDE SERPL-SCNC: 115 MMOL/L — HIGH (ref 98–110)
CHLORIDE SERPL-SCNC: 118 MMOL/L — HIGH (ref 98–110)
CHLORIDE SERPL-SCNC: 119 MMOL/L — HIGH (ref 98–110)
CHLORIDE SERPL-SCNC: 98 MMOL/L — SIGNIFICANT CHANGE UP (ref 98–110)
CO2 SERPL-SCNC: 16 MMOL/L — LOW (ref 17–32)
CO2 SERPL-SCNC: 16 MMOL/L — LOW (ref 17–32)
CO2 SERPL-SCNC: 17 MMOL/L — SIGNIFICANT CHANGE UP (ref 17–32)
CO2 SERPL-SCNC: 17 MMOL/L — SIGNIFICANT CHANGE UP (ref 17–32)
CO2 SERPL-SCNC: 18 MMOL/L — SIGNIFICANT CHANGE UP (ref 17–32)
CO2 SERPL-SCNC: 19 MMOL/L — SIGNIFICANT CHANGE UP (ref 17–32)
CO2 SERPL-SCNC: 20 MMOL/L — SIGNIFICANT CHANGE UP (ref 17–32)
CO2 SERPL-SCNC: 21 MMOL/L — SIGNIFICANT CHANGE UP (ref 17–32)
CO2 SERPL-SCNC: 21 MMOL/L — SIGNIFICANT CHANGE UP (ref 17–32)
CO2 SERPL-SCNC: 22 MMOL/L — SIGNIFICANT CHANGE UP (ref 17–32)
CO2 SERPL-SCNC: 22 MMOL/L — SIGNIFICANT CHANGE UP (ref 17–32)
COLOR SPEC: YELLOW — SIGNIFICANT CHANGE UP
COLOR SPEC: YELLOW — SIGNIFICANT CHANGE UP
COVID-19 SPIKE DOMAIN AB INTERP: NEGATIVE — SIGNIFICANT CHANGE UP
COVID-19 SPIKE DOMAIN ANTIBODY RESULT: 0.4 U/ML — SIGNIFICANT CHANGE UP
CREAT SERPL-MCNC: 0.8 MG/DL — SIGNIFICANT CHANGE UP (ref 0.7–1.5)
CREAT SERPL-MCNC: 0.8 MG/DL — SIGNIFICANT CHANGE UP (ref 0.7–1.5)
CREAT SERPL-MCNC: 1 MG/DL — SIGNIFICANT CHANGE UP (ref 0.7–1.5)
CREAT SERPL-MCNC: 1 MG/DL — SIGNIFICANT CHANGE UP (ref 0.7–1.5)
CREAT SERPL-MCNC: 1.1 MG/DL — SIGNIFICANT CHANGE UP (ref 0.7–1.5)
CREAT SERPL-MCNC: 1.1 MG/DL — SIGNIFICANT CHANGE UP (ref 0.7–1.5)
CREAT SERPL-MCNC: 1.5 MG/DL — SIGNIFICANT CHANGE UP (ref 0.7–1.5)
CREAT SERPL-MCNC: 1.6 MG/DL — HIGH (ref 0.7–1.5)
CREAT SERPL-MCNC: 1.9 MG/DL — HIGH (ref 0.7–1.5)
CRP SERPL-MCNC: 283 MG/L — HIGH
CRP SERPL-MCNC: 296 MG/L — HIGH
CRP SERPL-MCNC: 301 MG/L — HIGH
CRP SERPL-MCNC: 363 MG/L — HIGH
CULTURE RESULTS: NO GROWTH — SIGNIFICANT CHANGE UP
CULTURE RESULTS: SIGNIFICANT CHANGE UP
D DIMER BLD IA.RAPID-MCNC: 400 NG/ML DDU — HIGH (ref 0–230)
D DIMER BLD IA.RAPID-MCNC: 5119 NG/ML DDU — HIGH (ref 0–230)
D DIMER BLD IA.RAPID-MCNC: 646 NG/ML DDU — HIGH (ref 0–230)
DIFF PNL FLD: ABNORMAL
DIFF PNL FLD: SIGNIFICANT CHANGE UP
EOSINOPHIL # BLD AUTO: 0 K/UL — SIGNIFICANT CHANGE UP (ref 0–0.7)
EOSINOPHIL # BLD AUTO: 0.01 K/UL — SIGNIFICANT CHANGE UP (ref 0–0.7)
EOSINOPHIL # BLD AUTO: 0.03 K/UL — SIGNIFICANT CHANGE UP (ref 0–0.7)
EOSINOPHIL NFR BLD AUTO: 0 % — SIGNIFICANT CHANGE UP (ref 0–8)
EOSINOPHIL NFR BLD AUTO: 0.1 % — SIGNIFICANT CHANGE UP (ref 0–8)
EOSINOPHIL NFR BLD AUTO: 0.1 % — SIGNIFICANT CHANGE UP (ref 0–8)
EOSINOPHIL NFR BLD AUTO: 0.2 % — SIGNIFICANT CHANGE UP (ref 0–8)
EOSINOPHIL NFR BLD AUTO: 0.2 % — SIGNIFICANT CHANGE UP (ref 0–8)
EOSINOPHIL NFR BLD AUTO: 0.3 % — SIGNIFICANT CHANGE UP (ref 0–8)
EPI CELLS # UR: 1 /HPF — SIGNIFICANT CHANGE UP (ref 0–5)
EPI CELLS # UR: 1 /HPF — SIGNIFICANT CHANGE UP (ref 0–5)
ERYTHROCYTE [SEDIMENTATION RATE] IN BLOOD: 66 MM/HR — HIGH (ref 0–10)
ESTIMATED AVERAGE GLUCOSE: 174 MG/DL — HIGH (ref 68–114)
FERRITIN SERPL-MCNC: 750 NG/ML — HIGH (ref 30–400)
FERRITIN SERPL-MCNC: 882 NG/ML — HIGH (ref 30–400)
GAS PNL BLDA: SIGNIFICANT CHANGE UP
GAS PNL BLDV: 140 MMOL/L — SIGNIFICANT CHANGE UP (ref 136–145)
GAS PNL BLDV: SIGNIFICANT CHANGE UP
GAS PNL BLDV: SIGNIFICANT CHANGE UP
GIANT PLATELETS BLD QL SMEAR: PRESENT — SIGNIFICANT CHANGE UP
GLUCOSE BLDC GLUCOMTR-MCNC: 103 MG/DL — HIGH (ref 70–99)
GLUCOSE BLDC GLUCOMTR-MCNC: 106 MG/DL — HIGH (ref 70–99)
GLUCOSE BLDC GLUCOMTR-MCNC: 11 MG/DL — CRITICAL LOW (ref 70–99)
GLUCOSE BLDC GLUCOMTR-MCNC: 110 MG/DL — HIGH (ref 70–99)
GLUCOSE BLDC GLUCOMTR-MCNC: 119 MG/DL — HIGH (ref 70–99)
GLUCOSE BLDC GLUCOMTR-MCNC: 124 MG/DL — HIGH (ref 70–99)
GLUCOSE BLDC GLUCOMTR-MCNC: 132 MG/DL — HIGH (ref 70–99)
GLUCOSE BLDC GLUCOMTR-MCNC: 132 MG/DL — HIGH (ref 70–99)
GLUCOSE BLDC GLUCOMTR-MCNC: 133 MG/DL — HIGH (ref 70–99)
GLUCOSE BLDC GLUCOMTR-MCNC: 134 MG/DL — HIGH (ref 70–99)
GLUCOSE BLDC GLUCOMTR-MCNC: 134 MG/DL — HIGH (ref 70–99)
GLUCOSE BLDC GLUCOMTR-MCNC: 139 MG/DL — HIGH (ref 70–99)
GLUCOSE BLDC GLUCOMTR-MCNC: 140 MG/DL — HIGH (ref 70–99)
GLUCOSE BLDC GLUCOMTR-MCNC: 140 MG/DL — HIGH (ref 70–99)
GLUCOSE BLDC GLUCOMTR-MCNC: 146 MG/DL — HIGH (ref 70–99)
GLUCOSE BLDC GLUCOMTR-MCNC: 150 MG/DL — HIGH (ref 70–99)
GLUCOSE BLDC GLUCOMTR-MCNC: 158 MG/DL — HIGH (ref 70–99)
GLUCOSE BLDC GLUCOMTR-MCNC: 160 MG/DL — HIGH (ref 70–99)
GLUCOSE BLDC GLUCOMTR-MCNC: 164 MG/DL — HIGH (ref 70–99)
GLUCOSE BLDC GLUCOMTR-MCNC: 169 MG/DL — HIGH (ref 70–99)
GLUCOSE BLDC GLUCOMTR-MCNC: 173 MG/DL — HIGH (ref 70–99)
GLUCOSE BLDC GLUCOMTR-MCNC: 176 MG/DL — HIGH (ref 70–99)
GLUCOSE BLDC GLUCOMTR-MCNC: 177 MG/DL — HIGH (ref 70–99)
GLUCOSE BLDC GLUCOMTR-MCNC: 177 MG/DL — HIGH (ref 70–99)
GLUCOSE BLDC GLUCOMTR-MCNC: 187 MG/DL — HIGH (ref 70–99)
GLUCOSE BLDC GLUCOMTR-MCNC: 187 MG/DL — HIGH (ref 70–99)
GLUCOSE BLDC GLUCOMTR-MCNC: 191 MG/DL — HIGH (ref 70–99)
GLUCOSE BLDC GLUCOMTR-MCNC: 193 MG/DL — HIGH (ref 70–99)
GLUCOSE BLDC GLUCOMTR-MCNC: 198 MG/DL — HIGH (ref 70–99)
GLUCOSE BLDC GLUCOMTR-MCNC: 199 MG/DL — HIGH (ref 70–99)
GLUCOSE BLDC GLUCOMTR-MCNC: 200 MG/DL — HIGH (ref 70–99)
GLUCOSE BLDC GLUCOMTR-MCNC: 211 MG/DL — HIGH (ref 70–99)
GLUCOSE BLDC GLUCOMTR-MCNC: 22 MG/DL — CRITICAL LOW (ref 70–99)
GLUCOSE BLDC GLUCOMTR-MCNC: 220 MG/DL — HIGH (ref 70–99)
GLUCOSE BLDC GLUCOMTR-MCNC: 220 MG/DL — HIGH (ref 70–99)
GLUCOSE BLDC GLUCOMTR-MCNC: 221 MG/DL — HIGH (ref 70–99)
GLUCOSE BLDC GLUCOMTR-MCNC: 221 MG/DL — HIGH (ref 70–99)
GLUCOSE BLDC GLUCOMTR-MCNC: 224 MG/DL — HIGH (ref 70–99)
GLUCOSE BLDC GLUCOMTR-MCNC: 224 MG/DL — HIGH (ref 70–99)
GLUCOSE BLDC GLUCOMTR-MCNC: 225 MG/DL — HIGH (ref 70–99)
GLUCOSE BLDC GLUCOMTR-MCNC: 229 MG/DL — HIGH (ref 70–99)
GLUCOSE BLDC GLUCOMTR-MCNC: 232 MG/DL — HIGH (ref 70–99)
GLUCOSE BLDC GLUCOMTR-MCNC: 240 MG/DL — HIGH (ref 70–99)
GLUCOSE BLDC GLUCOMTR-MCNC: 245 MG/DL — HIGH (ref 70–99)
GLUCOSE BLDC GLUCOMTR-MCNC: 263 MG/DL — HIGH (ref 70–99)
GLUCOSE BLDC GLUCOMTR-MCNC: 263 MG/DL — HIGH (ref 70–99)
GLUCOSE BLDC GLUCOMTR-MCNC: 267 MG/DL — HIGH (ref 70–99)
GLUCOSE BLDC GLUCOMTR-MCNC: 269 MG/DL — HIGH (ref 70–99)
GLUCOSE BLDC GLUCOMTR-MCNC: 277 MG/DL — HIGH (ref 70–99)
GLUCOSE BLDC GLUCOMTR-MCNC: 280 MG/DL — HIGH (ref 70–99)
GLUCOSE BLDC GLUCOMTR-MCNC: 280 MG/DL — HIGH (ref 70–99)
GLUCOSE BLDC GLUCOMTR-MCNC: 284 MG/DL — HIGH (ref 70–99)
GLUCOSE BLDC GLUCOMTR-MCNC: 289 MG/DL — HIGH (ref 70–99)
GLUCOSE BLDC GLUCOMTR-MCNC: 291 MG/DL — HIGH (ref 70–99)
GLUCOSE BLDC GLUCOMTR-MCNC: 291 MG/DL — HIGH (ref 70–99)
GLUCOSE BLDC GLUCOMTR-MCNC: 292 MG/DL — HIGH (ref 70–99)
GLUCOSE BLDC GLUCOMTR-MCNC: 295 MG/DL — HIGH (ref 70–99)
GLUCOSE BLDC GLUCOMTR-MCNC: 302 MG/DL — HIGH (ref 70–99)
GLUCOSE BLDC GLUCOMTR-MCNC: 305 MG/DL — HIGH (ref 70–99)
GLUCOSE BLDC GLUCOMTR-MCNC: 338 MG/DL — HIGH (ref 70–99)
GLUCOSE BLDC GLUCOMTR-MCNC: 348 MG/DL — HIGH (ref 70–99)
GLUCOSE BLDC GLUCOMTR-MCNC: 353 MG/DL — HIGH (ref 70–99)
GLUCOSE BLDC GLUCOMTR-MCNC: 358 MG/DL — HIGH (ref 70–99)
GLUCOSE BLDC GLUCOMTR-MCNC: 359 MG/DL — HIGH (ref 70–99)
GLUCOSE BLDC GLUCOMTR-MCNC: 365 MG/DL — HIGH (ref 70–99)
GLUCOSE BLDC GLUCOMTR-MCNC: 366 MG/DL — HIGH (ref 70–99)
GLUCOSE BLDC GLUCOMTR-MCNC: 376 MG/DL — HIGH (ref 70–99)
GLUCOSE BLDC GLUCOMTR-MCNC: 44 MG/DL — CRITICAL LOW (ref 70–99)
GLUCOSE BLDC GLUCOMTR-MCNC: 47 MG/DL — CRITICAL LOW (ref 70–99)
GLUCOSE BLDC GLUCOMTR-MCNC: 48 MG/DL — CRITICAL LOW (ref 70–99)
GLUCOSE BLDC GLUCOMTR-MCNC: 52 MG/DL — CRITICAL LOW (ref 70–99)
GLUCOSE BLDC GLUCOMTR-MCNC: 52 MG/DL — CRITICAL LOW (ref 70–99)
GLUCOSE BLDC GLUCOMTR-MCNC: 54 MG/DL — CRITICAL LOW (ref 70–99)
GLUCOSE BLDC GLUCOMTR-MCNC: 64 MG/DL — LOW (ref 70–99)
GLUCOSE BLDC GLUCOMTR-MCNC: 66 MG/DL — LOW (ref 70–99)
GLUCOSE BLDC GLUCOMTR-MCNC: 67 MG/DL — LOW (ref 70–99)
GLUCOSE BLDC GLUCOMTR-MCNC: 68 MG/DL — LOW (ref 70–99)
GLUCOSE BLDC GLUCOMTR-MCNC: 71 MG/DL — SIGNIFICANT CHANGE UP (ref 70–99)
GLUCOSE BLDC GLUCOMTR-MCNC: 73 MG/DL — SIGNIFICANT CHANGE UP (ref 70–99)
GLUCOSE BLDC GLUCOMTR-MCNC: 79 MG/DL — SIGNIFICANT CHANGE UP (ref 70–99)
GLUCOSE BLDC GLUCOMTR-MCNC: 86 MG/DL — SIGNIFICANT CHANGE UP (ref 70–99)
GLUCOSE BLDC GLUCOMTR-MCNC: 94 MG/DL — SIGNIFICANT CHANGE UP (ref 70–99)
GLUCOSE SERPL-MCNC: 134 MG/DL — HIGH (ref 70–99)
GLUCOSE SERPL-MCNC: 167 MG/DL — HIGH (ref 70–99)
GLUCOSE SERPL-MCNC: 200 MG/DL — HIGH (ref 70–99)
GLUCOSE SERPL-MCNC: 218 MG/DL — HIGH (ref 70–99)
GLUCOSE SERPL-MCNC: 255 MG/DL — HIGH (ref 70–99)
GLUCOSE SERPL-MCNC: 256 MG/DL — HIGH (ref 70–99)
GLUCOSE SERPL-MCNC: 257 MG/DL — HIGH (ref 70–99)
GLUCOSE SERPL-MCNC: 266 MG/DL — HIGH (ref 70–99)
GLUCOSE SERPL-MCNC: 280 MG/DL — HIGH (ref 70–99)
GLUCOSE SERPL-MCNC: 58 MG/DL — LOW (ref 70–99)
GLUCOSE SERPL-MCNC: 91 MG/DL — SIGNIFICANT CHANGE UP (ref 70–99)
GLUCOSE UR QL: NEGATIVE — SIGNIFICANT CHANGE UP
GLUCOSE UR QL: NEGATIVE — SIGNIFICANT CHANGE UP
GRAM STN FLD: SIGNIFICANT CHANGE UP
HCO3 BLDV-SCNC: 24 MMOL/L — SIGNIFICANT CHANGE UP (ref 22–29)
HCT VFR BLD CALC: 18.2 % — LOW (ref 42–52)
HCT VFR BLD CALC: 21.7 % — LOW (ref 42–52)
HCT VFR BLD CALC: 23.3 % — LOW (ref 42–52)
HCT VFR BLD CALC: 26.4 % — LOW (ref 42–52)
HCT VFR BLD CALC: 26.6 % — LOW (ref 42–52)
HCT VFR BLD CALC: 26.9 % — LOW (ref 42–52)
HCT VFR BLD CALC: 27.2 % — LOW (ref 42–52)
HCT VFR BLD CALC: 28.5 % — LOW (ref 42–52)
HCT VFR BLD CALC: 28.8 % — LOW (ref 42–52)
HCT VFR BLD CALC: 29.5 % — LOW (ref 42–52)
HCT VFR BLD CALC: 34.7 % — LOW (ref 42–52)
HCT VFR BLDA CALC: 34.4 % — SIGNIFICANT CHANGE UP (ref 34–44)
HGB BLD CALC-MCNC: 11.2 G/DL — LOW (ref 14–18)
HGB BLD-MCNC: 11.8 G/DL — LOW (ref 14–18)
HGB BLD-MCNC: 5.8 G/DL — CRITICAL LOW (ref 14–18)
HGB BLD-MCNC: 7 G/DL — LOW (ref 14–18)
HGB BLD-MCNC: 7.5 G/DL — LOW (ref 14–18)
HGB BLD-MCNC: 8.4 G/DL — LOW (ref 14–18)
HGB BLD-MCNC: 8.7 G/DL — LOW (ref 14–18)
HGB BLD-MCNC: 8.8 G/DL — LOW (ref 14–18)
HGB BLD-MCNC: 8.9 G/DL — LOW (ref 14–18)
HGB BLD-MCNC: 9.2 G/DL — LOW (ref 14–18)
HGB BLD-MCNC: 9.2 G/DL — LOW (ref 14–18)
HGB BLD-MCNC: 9.7 G/DL — LOW (ref 14–18)
HYALINE CASTS # UR AUTO: 1 /LPF — SIGNIFICANT CHANGE UP (ref 0–7)
HYALINE CASTS # UR AUTO: 13 /LPF — HIGH (ref 0–7)
IMM GRANULOCYTES NFR BLD AUTO: 0.6 % — HIGH (ref 0.1–0.3)
IMM GRANULOCYTES NFR BLD AUTO: 0.8 % — HIGH (ref 0.1–0.3)
IMM GRANULOCYTES NFR BLD AUTO: 1.1 % — HIGH (ref 0.1–0.3)
IMM GRANULOCYTES NFR BLD AUTO: 1.1 % — HIGH (ref 0.1–0.3)
IMM GRANULOCYTES NFR BLD AUTO: 1.2 % — HIGH (ref 0.1–0.3)
IMM GRANULOCYTES NFR BLD AUTO: 1.2 % — HIGH (ref 0.1–0.3)
IMM GRANULOCYTES NFR BLD AUTO: 1.5 % — HIGH (ref 0.1–0.3)
IMM GRANULOCYTES NFR BLD AUTO: 1.7 % — HIGH (ref 0.1–0.3)
INR BLD: 1.64 RATIO — HIGH (ref 0.65–1.3)
INR BLD: 1.77 RATIO — HIGH (ref 0.65–1.3)
IRON SATN MFR SERPL: 14 % — LOW (ref 15–50)
IRON SATN MFR SERPL: 23 UG/DL — LOW (ref 35–150)
KETONES UR-MCNC: ABNORMAL
KETONES UR-MCNC: NEGATIVE — SIGNIFICANT CHANGE UP
LACTATE BLDV-MCNC: 3.8 MMOL/L — HIGH (ref 0.5–1.6)
LACTATE SERPL-SCNC: 1.7 MMOL/L — SIGNIFICANT CHANGE UP (ref 0.7–2)
LACTATE SERPL-SCNC: 3.7 MMOL/L — HIGH (ref 0.7–2)
LEUKOCYTE ESTERASE UR-ACNC: ABNORMAL
LEUKOCYTE ESTERASE UR-ACNC: NEGATIVE — SIGNIFICANT CHANGE UP
LYMPHOCYTES # BLD AUTO: 0.05 K/UL — LOW (ref 1.2–3.4)
LYMPHOCYTES # BLD AUTO: 0.31 K/UL — LOW (ref 1.2–3.4)
LYMPHOCYTES # BLD AUTO: 0.33 K/UL — LOW (ref 1.2–3.4)
LYMPHOCYTES # BLD AUTO: 0.39 K/UL — LOW (ref 1.2–3.4)
LYMPHOCYTES # BLD AUTO: 0.41 K/UL — LOW (ref 1.2–3.4)
LYMPHOCYTES # BLD AUTO: 0.43 K/UL — LOW (ref 1.2–3.4)
LYMPHOCYTES # BLD AUTO: 0.49 K/UL — LOW (ref 1.2–3.4)
LYMPHOCYTES # BLD AUTO: 0.49 K/UL — LOW (ref 1.2–3.4)
LYMPHOCYTES # BLD AUTO: 0.5 K/UL — LOW (ref 1.2–3.4)
LYMPHOCYTES # BLD AUTO: 0.54 K/UL — LOW (ref 1.2–3.4)
LYMPHOCYTES # BLD AUTO: 0.66 K/UL — LOW (ref 1.2–3.4)
LYMPHOCYTES # BLD AUTO: 0.9 % — LOW (ref 20.5–51.1)
LYMPHOCYTES # BLD AUTO: 10.7 % — LOW (ref 20.5–51.1)
LYMPHOCYTES # BLD AUTO: 12.6 % — LOW (ref 20.5–51.1)
LYMPHOCYTES # BLD AUTO: 4.1 % — LOW (ref 20.5–51.1)
LYMPHOCYTES # BLD AUTO: 4.8 % — LOW (ref 20.5–51.1)
LYMPHOCYTES # BLD AUTO: 5.7 % — LOW (ref 20.5–51.1)
LYMPHOCYTES # BLD AUTO: 5.9 % — LOW (ref 20.5–51.1)
LYMPHOCYTES # BLD AUTO: 7.8 % — LOW (ref 20.5–51.1)
LYMPHOCYTES # BLD AUTO: 9 % — LOW (ref 20.5–51.1)
MACROCYTES BLD QL: SLIGHT — SIGNIFICANT CHANGE UP
MAGNESIUM SERPL-MCNC: 1.9 MG/DL — SIGNIFICANT CHANGE UP (ref 1.8–2.4)
MAGNESIUM SERPL-MCNC: 2 MG/DL — SIGNIFICANT CHANGE UP (ref 1.8–2.4)
MAGNESIUM SERPL-MCNC: 2.1 MG/DL — SIGNIFICANT CHANGE UP (ref 1.8–2.4)
MAGNESIUM SERPL-MCNC: 2.2 MG/DL — SIGNIFICANT CHANGE UP (ref 1.8–2.4)
MAGNESIUM SERPL-MCNC: 2.4 MG/DL — SIGNIFICANT CHANGE UP (ref 1.8–2.4)
MAGNESIUM SERPL-MCNC: 2.4 MG/DL — SIGNIFICANT CHANGE UP (ref 1.8–2.4)
MANUAL SMEAR VERIFICATION: SIGNIFICANT CHANGE UP
MCHC RBC-ENTMCNC: 31.5 PG — HIGH (ref 27–31)
MCHC RBC-ENTMCNC: 31.6 PG — HIGH (ref 27–31)
MCHC RBC-ENTMCNC: 31.6 PG — HIGH (ref 27–31)
MCHC RBC-ENTMCNC: 31.7 PG — HIGH (ref 27–31)
MCHC RBC-ENTMCNC: 31.7 PG — HIGH (ref 27–31)
MCHC RBC-ENTMCNC: 31.8 G/DL — LOW (ref 32–37)
MCHC RBC-ENTMCNC: 31.9 G/DL — LOW (ref 32–37)
MCHC RBC-ENTMCNC: 31.9 G/DL — LOW (ref 32–37)
MCHC RBC-ENTMCNC: 31.9 PG — HIGH (ref 27–31)
MCHC RBC-ENTMCNC: 32.1 PG — HIGH (ref 27–31)
MCHC RBC-ENTMCNC: 32.2 G/DL — SIGNIFICANT CHANGE UP (ref 32–37)
MCHC RBC-ENTMCNC: 32.2 PG — HIGH (ref 27–31)
MCHC RBC-ENTMCNC: 32.2 PG — HIGH (ref 27–31)
MCHC RBC-ENTMCNC: 32.3 G/DL — SIGNIFICANT CHANGE UP (ref 32–37)
MCHC RBC-ENTMCNC: 32.5 PG — HIGH (ref 27–31)
MCHC RBC-ENTMCNC: 32.7 G/DL — SIGNIFICANT CHANGE UP (ref 32–37)
MCHC RBC-ENTMCNC: 32.9 G/DL — SIGNIFICANT CHANGE UP (ref 32–37)
MCHC RBC-ENTMCNC: 33 PG — HIGH (ref 27–31)
MCHC RBC-ENTMCNC: 33.1 G/DL — SIGNIFICANT CHANGE UP (ref 32–37)
MCHC RBC-ENTMCNC: 34 G/DL — SIGNIFICANT CHANGE UP (ref 32–37)
MCV RBC AUTO: 96.9 FL — HIGH (ref 80–94)
MCV RBC AUTO: 98 FL — HIGH (ref 80–94)
MCV RBC AUTO: 98.2 FL — HIGH (ref 80–94)
MCV RBC AUTO: 98.2 FL — HIGH (ref 80–94)
MCV RBC AUTO: 98.3 FL — HIGH (ref 80–94)
MCV RBC AUTO: 98.6 FL — HIGH (ref 80–94)
MCV RBC AUTO: 98.9 FL — HIGH (ref 80–94)
MCV RBC AUTO: 99 FL — HIGH (ref 80–94)
MCV RBC AUTO: 99 FL — HIGH (ref 80–94)
MCV RBC AUTO: 99.3 FL — HIGH (ref 80–94)
MCV RBC AUTO: 99.5 FL — HIGH (ref 80–94)
METAMYELOCYTES # FLD: 0.9 % — HIGH (ref 0–0)
METAMYELOCYTES # FLD: 3.6 % — HIGH (ref 0–0)
METHOD TYPE: SIGNIFICANT CHANGE UP
METHOD TYPE: SIGNIFICANT CHANGE UP
MONOCYTES # BLD AUTO: 0 K/UL — LOW (ref 0.1–0.6)
MONOCYTES # BLD AUTO: 0 K/UL — LOW (ref 0.1–0.6)
MONOCYTES # BLD AUTO: 0.06 K/UL — LOW (ref 0.1–0.6)
MONOCYTES # BLD AUTO: 0.09 K/UL — LOW (ref 0.1–0.6)
MONOCYTES # BLD AUTO: 0.11 K/UL — SIGNIFICANT CHANGE UP (ref 0.1–0.6)
MONOCYTES # BLD AUTO: 0.13 K/UL — SIGNIFICANT CHANGE UP (ref 0.1–0.6)
MONOCYTES # BLD AUTO: 0.16 K/UL — SIGNIFICANT CHANGE UP (ref 0.1–0.6)
MONOCYTES # BLD AUTO: 0.16 K/UL — SIGNIFICANT CHANGE UP (ref 0.1–0.6)
MONOCYTES # BLD AUTO: 0.17 K/UL — SIGNIFICANT CHANGE UP (ref 0.1–0.6)
MONOCYTES NFR BLD AUTO: 0 % — LOW (ref 1.7–9.3)
MONOCYTES NFR BLD AUTO: 0 % — LOW (ref 1.7–9.3)
MONOCYTES NFR BLD AUTO: 0.9 % — LOW (ref 1.7–9.3)
MONOCYTES NFR BLD AUTO: 1.1 % — LOW (ref 1.7–9.3)
MONOCYTES NFR BLD AUTO: 1.3 % — LOW (ref 1.7–9.3)
MONOCYTES NFR BLD AUTO: 1.5 % — LOW (ref 1.7–9.3)
MONOCYTES NFR BLD AUTO: 1.7 % — SIGNIFICANT CHANGE UP (ref 1.7–9.3)
MONOCYTES NFR BLD AUTO: 2 % — SIGNIFICANT CHANGE UP (ref 1.7–9.3)
MONOCYTES NFR BLD AUTO: 2 % — SIGNIFICANT CHANGE UP (ref 1.7–9.3)
MONOCYTES NFR BLD AUTO: 2.4 % — SIGNIFICANT CHANGE UP (ref 1.7–9.3)
MONOCYTES NFR BLD AUTO: 2.7 % — SIGNIFICANT CHANGE UP (ref 1.7–9.3)
MYELOCYTES NFR BLD: 0.9 % — HIGH (ref 0–0)
MYELOCYTES NFR BLD: 0.9 % — HIGH (ref 0–0)
NEUTROPHILS # BLD AUTO: 2.35 K/UL — SIGNIFICANT CHANGE UP (ref 1.4–6.5)
NEUTROPHILS # BLD AUTO: 4.44 K/UL — SIGNIFICANT CHANGE UP (ref 1.4–6.5)
NEUTROPHILS # BLD AUTO: 5.1 K/UL — SIGNIFICANT CHANGE UP (ref 1.4–6.5)
NEUTROPHILS # BLD AUTO: 5.19 K/UL — SIGNIFICANT CHANGE UP (ref 1.4–6.5)
NEUTROPHILS # BLD AUTO: 5.62 K/UL — SIGNIFICANT CHANGE UP (ref 1.4–6.5)
NEUTROPHILS # BLD AUTO: 5.68 K/UL — SIGNIFICANT CHANGE UP (ref 1.4–6.5)
NEUTROPHILS # BLD AUTO: 5.93 K/UL — SIGNIFICANT CHANGE UP (ref 1.4–6.5)
NEUTROPHILS # BLD AUTO: 6.85 K/UL — HIGH (ref 1.4–6.5)
NEUTROPHILS # BLD AUTO: 7.8 K/UL — HIGH (ref 1.4–6.5)
NEUTROPHILS # BLD AUTO: 9.37 K/UL — HIGH (ref 1.4–6.5)
NEUTROPHILS # BLD AUTO: 9.45 K/UL — HIGH (ref 1.4–6.5)
NEUTROPHILS NFR BLD AUTO: 76.8 % — HIGH (ref 42.2–75.2)
NEUTROPHILS NFR BLD AUTO: 85 % — HIGH (ref 42.2–75.2)
NEUTROPHILS NFR BLD AUTO: 86.7 % — HIGH (ref 42.2–75.2)
NEUTROPHILS NFR BLD AUTO: 89.6 % — HIGH (ref 42.2–75.2)
NEUTROPHILS NFR BLD AUTO: 89.8 % — HIGH (ref 42.2–75.2)
NEUTROPHILS NFR BLD AUTO: 90.9 % — HIGH (ref 42.2–75.2)
NEUTROPHILS NFR BLD AUTO: 91.4 % — HIGH (ref 42.2–75.2)
NEUTROPHILS NFR BLD AUTO: 91.5 % — HIGH (ref 42.2–75.2)
NEUTROPHILS NFR BLD AUTO: 92.7 % — HIGH (ref 42.2–75.2)
NEUTROPHILS NFR BLD AUTO: 93.2 % — HIGH (ref 42.2–75.2)
NEUTROPHILS NFR BLD AUTO: 95.5 % — HIGH (ref 42.2–75.2)
NEUTS BAND # BLD: 4.4 % — SIGNIFICANT CHANGE UP (ref 0–6)
NITRITE UR-MCNC: NEGATIVE — SIGNIFICANT CHANGE UP
NITRITE UR-MCNC: NEGATIVE — SIGNIFICANT CHANGE UP
NRBC # BLD: 0 /100 WBCS — SIGNIFICANT CHANGE UP (ref 0–0)
NRBC # BLD: 1 /100 — HIGH (ref 0–0)
NRBC # BLD: SIGNIFICANT CHANGE UP /100 WBCS (ref 0–0)
ORGANISM # SPEC MICROSCOPIC CNT: SIGNIFICANT CHANGE UP
PCO2 BLDV: 43 MMHG — SIGNIFICANT CHANGE UP (ref 41–51)
PH BLDV: 7.35 — SIGNIFICANT CHANGE UP (ref 7.26–7.43)
PH UR: 6 — SIGNIFICANT CHANGE UP (ref 5–8)
PH UR: 6 — SIGNIFICANT CHANGE UP (ref 5–8)
PHOSPHATE SERPL-MCNC: 4.3 MG/DL — SIGNIFICANT CHANGE UP (ref 2.1–4.9)
PLAT MORPH BLD: NORMAL — SIGNIFICANT CHANGE UP
PLATELET # BLD AUTO: 102 K/UL — LOW (ref 130–400)
PLATELET # BLD AUTO: 107 K/UL — LOW (ref 130–400)
PLATELET # BLD AUTO: 144 K/UL — SIGNIFICANT CHANGE UP (ref 130–400)
PLATELET # BLD AUTO: 159 K/UL — SIGNIFICANT CHANGE UP (ref 130–400)
PLATELET # BLD AUTO: 179 K/UL — SIGNIFICANT CHANGE UP (ref 130–400)
PLATELET # BLD AUTO: 206 K/UL — SIGNIFICANT CHANGE UP (ref 130–400)
PLATELET # BLD AUTO: 213 K/UL — SIGNIFICANT CHANGE UP (ref 130–400)
PLATELET # BLD AUTO: 248 K/UL — SIGNIFICANT CHANGE UP (ref 130–400)
PLATELET # BLD AUTO: 284 K/UL — SIGNIFICANT CHANGE UP (ref 130–400)
PLATELET # BLD AUTO: 303 K/UL — SIGNIFICANT CHANGE UP (ref 130–400)
PLATELET # BLD AUTO: 82 K/UL — LOW (ref 130–400)
PO2 BLDV: 26 MMHG — SIGNIFICANT CHANGE UP (ref 20–40)
POLYCHROMASIA BLD QL SMEAR: SLIGHT — SIGNIFICANT CHANGE UP
POLYCHROMASIA BLD QL SMEAR: SLIGHT — SIGNIFICANT CHANGE UP
POTASSIUM BLDV-SCNC: 4.6 MMOL/L — SIGNIFICANT CHANGE UP (ref 3.3–5.6)
POTASSIUM SERPL-MCNC: 3.4 MMOL/L — LOW (ref 3.5–5)
POTASSIUM SERPL-MCNC: 3.9 MMOL/L — SIGNIFICANT CHANGE UP (ref 3.5–5)
POTASSIUM SERPL-MCNC: 4.1 MMOL/L — SIGNIFICANT CHANGE UP (ref 3.5–5)
POTASSIUM SERPL-MCNC: 4.2 MMOL/L — SIGNIFICANT CHANGE UP (ref 3.5–5)
POTASSIUM SERPL-MCNC: 4.2 MMOL/L — SIGNIFICANT CHANGE UP (ref 3.5–5)
POTASSIUM SERPL-MCNC: 4.4 MMOL/L — SIGNIFICANT CHANGE UP (ref 3.5–5)
POTASSIUM SERPL-MCNC: 4.5 MMOL/L — SIGNIFICANT CHANGE UP (ref 3.5–5)
POTASSIUM SERPL-MCNC: 4.6 MMOL/L — SIGNIFICANT CHANGE UP (ref 3.5–5)
POTASSIUM SERPL-MCNC: 4.6 MMOL/L — SIGNIFICANT CHANGE UP (ref 3.5–5)
POTASSIUM SERPL-MCNC: 4.7 MMOL/L — SIGNIFICANT CHANGE UP (ref 3.5–5)
POTASSIUM SERPL-MCNC: 5.1 MMOL/L — HIGH (ref 3.5–5)
POTASSIUM SERPL-SCNC: 3.4 MMOL/L — LOW (ref 3.5–5)
POTASSIUM SERPL-SCNC: 3.9 MMOL/L — SIGNIFICANT CHANGE UP (ref 3.5–5)
POTASSIUM SERPL-SCNC: 4.1 MMOL/L — SIGNIFICANT CHANGE UP (ref 3.5–5)
POTASSIUM SERPL-SCNC: 4.2 MMOL/L — SIGNIFICANT CHANGE UP (ref 3.5–5)
POTASSIUM SERPL-SCNC: 4.2 MMOL/L — SIGNIFICANT CHANGE UP (ref 3.5–5)
POTASSIUM SERPL-SCNC: 4.4 MMOL/L — SIGNIFICANT CHANGE UP (ref 3.5–5)
POTASSIUM SERPL-SCNC: 4.5 MMOL/L — SIGNIFICANT CHANGE UP (ref 3.5–5)
POTASSIUM SERPL-SCNC: 4.6 MMOL/L — SIGNIFICANT CHANGE UP (ref 3.5–5)
POTASSIUM SERPL-SCNC: 4.6 MMOL/L — SIGNIFICANT CHANGE UP (ref 3.5–5)
POTASSIUM SERPL-SCNC: 4.7 MMOL/L — SIGNIFICANT CHANGE UP (ref 3.5–5)
POTASSIUM SERPL-SCNC: 5.1 MMOL/L — HIGH (ref 3.5–5)
PROCALCITONIN SERPL-MCNC: 0.43 NG/ML — HIGH (ref 0.02–0.1)
PROCALCITONIN SERPL-MCNC: 0.51 NG/ML — HIGH (ref 0.02–0.1)
PROCALCITONIN SERPL-MCNC: 1.16 NG/ML — HIGH (ref 0.02–0.1)
PROCALCITONIN SERPL-MCNC: 1.78 NG/ML — HIGH (ref 0.02–0.1)
PROMYELOCYTES # FLD: 0.9 % — HIGH (ref 0–0)
PROT SERPL-MCNC: 4.8 G/DL — LOW (ref 6–8)
PROT SERPL-MCNC: 4.9 G/DL — LOW (ref 6–8)
PROT SERPL-MCNC: 5.1 G/DL — LOW (ref 6–8)
PROT SERPL-MCNC: 5.2 G/DL — LOW (ref 6–8)
PROT SERPL-MCNC: 5.2 G/DL — LOW (ref 6–8)
PROT SERPL-MCNC: 5.3 G/DL — LOW (ref 6–8)
PROT SERPL-MCNC: 5.3 G/DL — LOW (ref 6–8)
PROT SERPL-MCNC: 5.4 G/DL — LOW (ref 6–8)
PROT SERPL-MCNC: 5.5 G/DL — LOW (ref 6–8)
PROT SERPL-MCNC: 5.5 G/DL — LOW (ref 6–8)
PROT SERPL-MCNC: 6.7 G/DL — SIGNIFICANT CHANGE UP (ref 6–8)
PROT UR-MCNC: ABNORMAL
PROT UR-MCNC: ABNORMAL
PROTHROM AB SERPL-ACNC: 18.9 SEC — HIGH (ref 9.95–12.87)
PROTHROM AB SERPL-ACNC: 20.3 SEC — HIGH (ref 9.95–12.87)
RAPID RVP RESULT: DETECTED
RBC # BLD: 1.83 M/UL — LOW (ref 4.7–6.1)
RBC # BLD: 2.21 M/UL — LOW (ref 4.7–6.1)
RBC # BLD: 2.37 M/UL — LOW (ref 4.7–6.1)
RBC # BLD: 2.67 M/UL — LOW (ref 4.7–6.1)
RBC # BLD: 2.71 M/UL — LOW (ref 4.7–6.1)
RBC # BLD: 2.71 M/UL — LOW (ref 4.7–6.1)
RBC # BLD: 2.76 M/UL — LOW (ref 4.7–6.1)
RBC # BLD: 2.88 M/UL — LOW (ref 4.7–6.1)
RBC # BLD: 2.91 M/UL — LOW (ref 4.7–6.1)
RBC # BLD: 3.01 M/UL — LOW (ref 4.7–6.1)
RBC # BLD: 3.58 M/UL — LOW (ref 4.7–6.1)
RBC # FLD: 12.9 % — SIGNIFICANT CHANGE UP (ref 11.5–14.5)
RBC # FLD: 13.2 % — SIGNIFICANT CHANGE UP (ref 11.5–14.5)
RBC # FLD: 13.3 % — SIGNIFICANT CHANGE UP (ref 11.5–14.5)
RBC # FLD: 13.4 % — SIGNIFICANT CHANGE UP (ref 11.5–14.5)
RBC # FLD: 13.5 % — SIGNIFICANT CHANGE UP (ref 11.5–14.5)
RBC # FLD: 13.5 % — SIGNIFICANT CHANGE UP (ref 11.5–14.5)
RBC # FLD: 13.8 % — SIGNIFICANT CHANGE UP (ref 11.5–14.5)
RBC # FLD: 14 % — SIGNIFICANT CHANGE UP (ref 11.5–14.5)
RBC # FLD: 14.3 % — SIGNIFICANT CHANGE UP (ref 11.5–14.5)
RBC # FLD: 14.4 % — SIGNIFICANT CHANGE UP (ref 11.5–14.5)
RBC # FLD: 16.2 % — HIGH (ref 11.5–14.5)
RBC BLD AUTO: ABNORMAL
RBC BLD AUTO: NORMAL — SIGNIFICANT CHANGE UP
RBC BLD AUTO: NORMAL — SIGNIFICANT CHANGE UP
RBC CASTS # UR COMP ASSIST: 1 /HPF — SIGNIFICANT CHANGE UP (ref 0–4)
RBC CASTS # UR COMP ASSIST: 3 /HPF — SIGNIFICANT CHANGE UP (ref 0–4)
SAO2 % BLDV: 40 % — SIGNIFICANT CHANGE UP
SARS-COV-2 IGG+IGM SERPL QL IA: 0.4 U/ML — SIGNIFICANT CHANGE UP
SARS-COV-2 IGG+IGM SERPL QL IA: NEGATIVE — SIGNIFICANT CHANGE UP
SARS-COV-2 RNA SPEC QL NAA+PROBE: DETECTED
SMUDGE CELLS # BLD: PRESENT — SIGNIFICANT CHANGE UP
SODIUM SERPL-SCNC: 137 MMOL/L — SIGNIFICANT CHANGE UP (ref 135–146)
SODIUM SERPL-SCNC: 138 MMOL/L — SIGNIFICANT CHANGE UP (ref 135–146)
SODIUM SERPL-SCNC: 140 MMOL/L — SIGNIFICANT CHANGE UP (ref 135–146)
SODIUM SERPL-SCNC: 142 MMOL/L — SIGNIFICANT CHANGE UP (ref 135–146)
SODIUM SERPL-SCNC: 142 MMOL/L — SIGNIFICANT CHANGE UP (ref 135–146)
SODIUM SERPL-SCNC: 145 MMOL/L — SIGNIFICANT CHANGE UP (ref 135–146)
SODIUM SERPL-SCNC: 147 MMOL/L — HIGH (ref 135–146)
SODIUM SERPL-SCNC: 148 MMOL/L — HIGH (ref 135–146)
SODIUM SERPL-SCNC: 149 MMOL/L — HIGH (ref 135–146)
SODIUM SERPL-SCNC: 151 MMOL/L — HIGH (ref 135–146)
SODIUM SERPL-SCNC: 152 MMOL/L — HIGH (ref 135–146)
SP GR SPEC: 1.01 — SIGNIFICANT CHANGE UP (ref 1.01–1.03)
SP GR SPEC: 1.03 — SIGNIFICANT CHANGE UP (ref 1.01–1.03)
SPECIMEN SOURCE: SIGNIFICANT CHANGE UP
TIBC SERPL-MCNC: 165 UG/DL — LOW (ref 220–430)
TRANSFERRIN SERPL-MCNC: 129 MG/DL — LOW (ref 200–360)
TROPONIN T SERPL-MCNC: 0.02 NG/ML — HIGH
TROPONIN T SERPL-MCNC: 0.04 NG/ML — CRITICAL HIGH
TROPONIN T SERPL-MCNC: 0.05 NG/ML — CRITICAL HIGH
UIBC SERPL-MCNC: 142 UG/DL — SIGNIFICANT CHANGE UP (ref 110–370)
UROBILINOGEN FLD QL: SIGNIFICANT CHANGE UP
UROBILINOGEN FLD QL: SIGNIFICANT CHANGE UP
VALPROATE SERPL-MCNC: 71 UG/ML — SIGNIFICANT CHANGE UP (ref 50–100)
VALPROATE SERPL-MCNC: 8 UG/ML — LOW (ref 50–100)
VARIANT LYMPHS # BLD: 4.5 % — SIGNIFICANT CHANGE UP (ref 0–5)
WBC # BLD: 10.1 K/UL — SIGNIFICANT CHANGE UP (ref 4.8–10.8)
WBC # BLD: 10.14 K/UL — SIGNIFICANT CHANGE UP (ref 4.8–10.8)
WBC # BLD: 2.9 K/UL — LOW (ref 4.8–10.8)
WBC # BLD: 5.23 K/UL — SIGNIFICANT CHANGE UP (ref 4.8–10.8)
WBC # BLD: 5.61 K/UL — SIGNIFICANT CHANGE UP (ref 4.8–10.8)
WBC # BLD: 5.95 K/UL — SIGNIFICANT CHANGE UP (ref 4.8–10.8)
WBC # BLD: 5.98 K/UL — SIGNIFICANT CHANGE UP (ref 4.8–10.8)
WBC # BLD: 6.27 K/UL — SIGNIFICANT CHANGE UP (ref 4.8–10.8)
WBC # BLD: 6.6 K/UL — SIGNIFICANT CHANGE UP (ref 4.8–10.8)
WBC # BLD: 7.49 K/UL — SIGNIFICANT CHANGE UP (ref 4.8–10.8)
WBC # BLD: 8.53 K/UL — SIGNIFICANT CHANGE UP (ref 4.8–10.8)
WBC # FLD AUTO: 10.1 K/UL — SIGNIFICANT CHANGE UP (ref 4.8–10.8)
WBC # FLD AUTO: 10.14 K/UL — SIGNIFICANT CHANGE UP (ref 4.8–10.8)
WBC # FLD AUTO: 2.9 K/UL — LOW (ref 4.8–10.8)
WBC # FLD AUTO: 5.23 K/UL — SIGNIFICANT CHANGE UP (ref 4.8–10.8)
WBC # FLD AUTO: 5.61 K/UL — SIGNIFICANT CHANGE UP (ref 4.8–10.8)
WBC # FLD AUTO: 5.95 K/UL — SIGNIFICANT CHANGE UP (ref 4.8–10.8)
WBC # FLD AUTO: 5.98 K/UL — SIGNIFICANT CHANGE UP (ref 4.8–10.8)
WBC # FLD AUTO: 6.27 K/UL — SIGNIFICANT CHANGE UP (ref 4.8–10.8)
WBC # FLD AUTO: 6.6 K/UL — SIGNIFICANT CHANGE UP (ref 4.8–10.8)
WBC # FLD AUTO: 7.49 K/UL — SIGNIFICANT CHANGE UP (ref 4.8–10.8)
WBC # FLD AUTO: 8.53 K/UL — SIGNIFICANT CHANGE UP (ref 4.8–10.8)
WBC UR QL: 1 /HPF — SIGNIFICANT CHANGE UP (ref 0–5)
WBC UR QL: 676 /HPF — HIGH (ref 0–5)

## 2021-01-01 PROCEDURE — 71045 X-RAY EXAM CHEST 1 VIEW: CPT | Mod: 26

## 2021-01-01 PROCEDURE — 99223 1ST HOSP IP/OBS HIGH 75: CPT

## 2021-01-01 PROCEDURE — 70496 CT ANGIOGRAPHY HEAD: CPT | Mod: 26

## 2021-01-01 PROCEDURE — 74177 CT ABD & PELVIS W/CONTRAST: CPT | Mod: 26

## 2021-01-01 PROCEDURE — 99232 SBSQ HOSP IP/OBS MODERATE 35: CPT

## 2021-01-01 PROCEDURE — 95819 EEG AWAKE AND ASLEEP: CPT | Mod: 26

## 2021-01-01 PROCEDURE — 99233 SBSQ HOSP IP/OBS HIGH 50: CPT

## 2021-01-01 PROCEDURE — 99285 EMERGENCY DEPT VISIT HI MDM: CPT

## 2021-01-01 PROCEDURE — 99497 ADVNCD CARE PLAN 30 MIN: CPT | Mod: 25

## 2021-01-01 PROCEDURE — 99497 ADVNCD CARE PLAN 30 MIN: CPT

## 2021-01-01 PROCEDURE — 93010 ELECTROCARDIOGRAM REPORT: CPT

## 2021-01-01 PROCEDURE — 99498 ADVNCD CARE PLAN ADDL 30 MIN: CPT

## 2021-01-01 PROCEDURE — 70498 CT ANGIOGRAPHY NECK: CPT | Mod: 26

## 2021-01-01 PROCEDURE — 70450 CT HEAD/BRAIN W/O DYE: CPT | Mod: 26

## 2021-01-01 PROCEDURE — 99282 EMERGENCY DEPT VISIT SF MDM: CPT

## 2021-01-01 PROCEDURE — 93970 EXTREMITY STUDY: CPT | Mod: 26

## 2021-01-01 PROCEDURE — 93010 ELECTROCARDIOGRAM REPORT: CPT | Mod: 77

## 2021-01-01 PROCEDURE — 99222 1ST HOSP IP/OBS MODERATE 55: CPT

## 2021-01-01 PROCEDURE — 71275 CT ANGIOGRAPHY CHEST: CPT | Mod: 26

## 2021-01-01 RX ORDER — CARBAMAZEPINE 200 MG
200 TABLET ORAL
Refills: 0 | Status: DISCONTINUED | OUTPATIENT
Start: 2021-01-01 | End: 2021-01-01

## 2021-01-01 RX ORDER — SODIUM CHLORIDE 9 MG/ML
1000 INJECTION, SOLUTION INTRAVENOUS ONCE
Refills: 0 | Status: COMPLETED | OUTPATIENT
Start: 2021-01-01 | End: 2021-01-01

## 2021-01-01 RX ORDER — SODIUM CHLORIDE 9 MG/ML
1000 INJECTION, SOLUTION INTRAVENOUS
Refills: 0 | Status: DISCONTINUED | OUTPATIENT
Start: 2021-01-01 | End: 2021-01-01

## 2021-01-01 RX ORDER — INSULIN LISPRO 100/ML
2 VIAL (ML) SUBCUTANEOUS
Refills: 0 | Status: DISCONTINUED | OUTPATIENT
Start: 2021-01-01 | End: 2021-01-01

## 2021-01-01 RX ORDER — DEXTROSE 50 % IN WATER 50 %
15 SYRINGE (ML) INTRAVENOUS ONCE
Refills: 0 | Status: DISCONTINUED | OUTPATIENT
Start: 2021-01-01 | End: 2021-01-01

## 2021-01-01 RX ORDER — CEFEPIME 1 G/1
1000 INJECTION, POWDER, FOR SOLUTION INTRAMUSCULAR; INTRAVENOUS EVERY 12 HOURS
Refills: 0 | Status: DISCONTINUED | OUTPATIENT
Start: 2021-01-01 | End: 2021-01-01

## 2021-01-01 RX ORDER — VALPROIC ACID (AS SODIUM SALT) 250 MG/5ML
125 SOLUTION, ORAL ORAL EVERY 6 HOURS
Refills: 0 | Status: DISCONTINUED | OUTPATIENT
Start: 2021-01-01 | End: 2021-01-01

## 2021-01-01 RX ORDER — CARBAMAZEPINE 200 MG
400 TABLET ORAL DAILY
Refills: 0 | Status: DISCONTINUED | OUTPATIENT
Start: 2021-01-01 | End: 2021-01-01

## 2021-01-01 RX ORDER — ENOXAPARIN SODIUM 100 MG/ML
40 INJECTION SUBCUTANEOUS DAILY
Refills: 0 | Status: DISCONTINUED | OUTPATIENT
Start: 2021-01-01 | End: 2021-01-01

## 2021-01-01 RX ORDER — SODIUM CHLORIDE 9 MG/ML
1000 INJECTION INTRAMUSCULAR; INTRAVENOUS; SUBCUTANEOUS ONCE
Refills: 0 | Status: COMPLETED | OUTPATIENT
Start: 2021-01-01 | End: 2021-01-01

## 2021-01-01 RX ORDER — CEFTRIAXONE 500 MG/1
1000 INJECTION, POWDER, FOR SOLUTION INTRAMUSCULAR; INTRAVENOUS EVERY 24 HOURS
Refills: 0 | Status: DISCONTINUED | OUTPATIENT
Start: 2021-01-01 | End: 2021-01-01

## 2021-01-01 RX ORDER — IOHEXOL 300 MG/ML
30 INJECTION, SOLUTION INTRAVENOUS ONCE
Refills: 0 | Status: DISCONTINUED | OUTPATIENT
Start: 2021-01-01 | End: 2021-01-01

## 2021-01-01 RX ORDER — DEXTROSE 50 % IN WATER 50 %
12.5 SYRINGE (ML) INTRAVENOUS ONCE
Refills: 0 | Status: DISCONTINUED | OUTPATIENT
Start: 2021-01-01 | End: 2021-01-01

## 2021-01-01 RX ORDER — METOPROLOL TARTRATE 50 MG
50 TABLET ORAL
Refills: 0 | Status: DISCONTINUED | OUTPATIENT
Start: 2021-01-01 | End: 2021-01-01

## 2021-01-01 RX ORDER — METOPROLOL TARTRATE 50 MG
1 TABLET ORAL
Qty: 0 | Refills: 0 | DISCHARGE

## 2021-01-01 RX ORDER — POTASSIUM CHLORIDE 20 MEQ
40 PACKET (EA) ORAL ONCE
Refills: 0 | Status: COMPLETED | OUTPATIENT
Start: 2021-01-01 | End: 2021-01-01

## 2021-01-01 RX ORDER — ACETAMINOPHEN 500 MG
650 TABLET ORAL ONCE
Refills: 0 | Status: COMPLETED | OUTPATIENT
Start: 2021-01-01 | End: 2021-01-01

## 2021-01-01 RX ORDER — DEXTROSE 50 % IN WATER 50 %
25 SYRINGE (ML) INTRAVENOUS ONCE
Refills: 0 | Status: COMPLETED | OUTPATIENT
Start: 2021-01-01 | End: 2021-01-01

## 2021-01-01 RX ORDER — CLOPIDOGREL BISULFATE 75 MG/1
75 TABLET, FILM COATED ORAL DAILY
Refills: 0 | Status: DISCONTINUED | OUTPATIENT
Start: 2021-01-01 | End: 2021-01-01

## 2021-01-01 RX ORDER — ELECTROLYTE SOLUTION,INJ
1 VIAL (ML) INTRAVENOUS
Refills: 0 | Status: DISCONTINUED | OUTPATIENT
Start: 2021-01-01 | End: 2021-01-01

## 2021-01-01 RX ORDER — INSULIN GLARGINE 100 [IU]/ML
9 INJECTION, SOLUTION SUBCUTANEOUS EVERY MORNING
Refills: 0 | Status: ACTIVE | OUTPATIENT
Start: 2021-01-01 | End: 2022-02-21

## 2021-01-01 RX ORDER — DEXTROSE 50 % IN WATER 50 %
25 SYRINGE (ML) INTRAVENOUS ONCE
Refills: 0 | Status: DISCONTINUED | OUTPATIENT
Start: 2021-01-01 | End: 2021-01-01

## 2021-01-01 RX ORDER — INSULIN LISPRO 100/ML
8 VIAL (ML) SUBCUTANEOUS ONCE
Refills: 0 | Status: COMPLETED | OUTPATIENT
Start: 2021-01-01 | End: 2021-01-01

## 2021-01-01 RX ORDER — AMPICILLIN SODIUM AND SULBACTAM SODIUM 250; 125 MG/ML; MG/ML
3 INJECTION, POWDER, FOR SUSPENSION INTRAMUSCULAR; INTRAVENOUS EVERY 12 HOURS
Refills: 0 | Status: DISCONTINUED | OUTPATIENT
Start: 2021-01-01 | End: 2021-01-01

## 2021-01-01 RX ORDER — INSULIN GLARGINE 100 [IU]/ML
12 INJECTION, SOLUTION SUBCUTANEOUS AT BEDTIME
Refills: 0 | Status: DISCONTINUED | OUTPATIENT
Start: 2021-01-01 | End: 2021-01-01

## 2021-01-01 RX ORDER — INSULIN LISPRO 100/ML
5 VIAL (ML) SUBCUTANEOUS
Refills: 0 | Status: DISCONTINUED | OUTPATIENT
Start: 2021-01-01 | End: 2021-01-01

## 2021-01-01 RX ORDER — DEXAMETHASONE 0.5 MG/5ML
6 ELIXIR ORAL DAILY
Refills: 0 | Status: DISCONTINUED | OUTPATIENT
Start: 2021-01-01 | End: 2021-01-01

## 2021-01-01 RX ORDER — SODIUM CHLORIDE 9 MG/ML
500 INJECTION, SOLUTION INTRAVENOUS ONCE
Refills: 0 | Status: COMPLETED | OUTPATIENT
Start: 2021-01-01 | End: 2021-01-01

## 2021-01-01 RX ORDER — DEXTROSE 50 % IN WATER 50 %
50 SYRINGE (ML) INTRAVENOUS ONCE
Refills: 0 | Status: DISCONTINUED | OUTPATIENT
Start: 2021-01-01 | End: 2021-01-01

## 2021-01-01 RX ORDER — MORPHINE SULFATE 50 MG/1
2 CAPSULE, EXTENDED RELEASE ORAL ONCE
Refills: 0 | Status: DISCONTINUED | OUTPATIENT
Start: 2021-01-01 | End: 2021-01-01

## 2021-01-01 RX ORDER — INSULIN GLARGINE 100 [IU]/ML
20 INJECTION, SOLUTION SUBCUTANEOUS AT BEDTIME
Refills: 0 | Status: DISCONTINUED | OUTPATIENT
Start: 2021-01-01 | End: 2021-01-01

## 2021-01-01 RX ORDER — VANCOMYCIN HCL 1 G
1000 VIAL (EA) INTRAVENOUS ONCE
Refills: 0 | Status: COMPLETED | OUTPATIENT
Start: 2021-01-01 | End: 2021-01-01

## 2021-01-01 RX ORDER — MORPHINE SULFATE 50 MG/1
2 CAPSULE, EXTENDED RELEASE ORAL EVERY 4 HOURS
Refills: 0 | Status: DISCONTINUED | OUTPATIENT
Start: 2021-01-01 | End: 2021-01-01

## 2021-01-01 RX ORDER — INSULIN GLARGINE 100 [IU]/ML
5 INJECTION, SOLUTION SUBCUTANEOUS AT BEDTIME
Refills: 0 | Status: DISCONTINUED | OUTPATIENT
Start: 2021-01-01 | End: 2021-01-01

## 2021-01-01 RX ORDER — METOPROLOL TARTRATE 50 MG
5 TABLET ORAL ONCE
Refills: 0 | Status: COMPLETED | OUTPATIENT
Start: 2021-01-01 | End: 2021-01-01

## 2021-01-01 RX ORDER — MORPHINE SULFATE 50 MG/1
2 CAPSULE, EXTENDED RELEASE ORAL
Refills: 0 | Status: ACTIVE | OUTPATIENT
Start: 2021-01-01 | End: 2021-04-07

## 2021-01-01 RX ORDER — INSULIN GLARGINE 100 [IU]/ML
4 INJECTION, SOLUTION SUBCUTANEOUS
Refills: 0 | Status: DISCONTINUED | OUTPATIENT
Start: 2021-01-01 | End: 2021-01-01

## 2021-01-01 RX ORDER — CARBAMAZEPINE 200 MG
400 TABLET ORAL
Refills: 0 | Status: DISCONTINUED | OUTPATIENT
Start: 2021-01-01 | End: 2021-01-01

## 2021-01-01 RX ORDER — LISINOPRIL 2.5 MG/1
5 TABLET ORAL DAILY
Refills: 0 | Status: DISCONTINUED | OUTPATIENT
Start: 2021-01-01 | End: 2021-01-01

## 2021-01-01 RX ORDER — DIVALPROEX SODIUM 500 MG/1
500 TABLET, DELAYED RELEASE ORAL DAILY
Refills: 0 | Status: DISCONTINUED | OUTPATIENT
Start: 2021-01-01 | End: 2021-01-01

## 2021-01-01 RX ORDER — ACETAMINOPHEN 500 MG
650 TABLET ORAL EVERY 6 HOURS
Refills: 0 | Status: DISCONTINUED | OUTPATIENT
Start: 2021-01-01 | End: 2021-01-01

## 2021-01-01 RX ORDER — CHLORHEXIDINE GLUCONATE 213 G/1000ML
1 SOLUTION TOPICAL
Refills: 0 | Status: DISCONTINUED | OUTPATIENT
Start: 2021-01-01 | End: 2021-01-01

## 2021-01-01 RX ORDER — SODIUM CHLORIDE 9 MG/ML
500 INJECTION INTRAMUSCULAR; INTRAVENOUS; SUBCUTANEOUS ONCE
Refills: 0 | Status: COMPLETED | OUTPATIENT
Start: 2021-01-01 | End: 2021-01-01

## 2021-01-01 RX ORDER — SODIUM CHLORIDE 9 MG/ML
1000 INJECTION, SOLUTION INTRAVENOUS ONCE
Refills: 0 | Status: DISCONTINUED | OUTPATIENT
Start: 2021-01-01 | End: 2021-01-01

## 2021-01-01 RX ORDER — ATORVASTATIN CALCIUM 80 MG/1
20 TABLET, FILM COATED ORAL AT BEDTIME
Refills: 0 | Status: DISCONTINUED | OUTPATIENT
Start: 2021-01-01 | End: 2021-01-01

## 2021-01-01 RX ORDER — GLUCAGON INJECTION, SOLUTION 0.5 MG/.1ML
1 INJECTION, SOLUTION SUBCUTANEOUS ONCE
Refills: 0 | Status: DISCONTINUED | OUTPATIENT
Start: 2021-01-01 | End: 2021-01-01

## 2021-01-01 RX ORDER — HALOPERIDOL DECANOATE 100 MG/ML
0.5 INJECTION INTRAMUSCULAR EVERY 8 HOURS
Refills: 0 | Status: ACTIVE | OUTPATIENT
Start: 2021-01-01 | End: 2022-02-26

## 2021-01-01 RX ORDER — INSULIN LISPRO 100/ML
6 VIAL (ML) SUBCUTANEOUS
Refills: 0 | Status: DISCONTINUED | OUTPATIENT
Start: 2021-01-01 | End: 2021-01-01

## 2021-01-01 RX ORDER — I.V. FAT EMULSION 20 G/100ML
1.8 EMULSION INTRAVENOUS
Qty: 100.08 | Refills: 0 | Status: DISCONTINUED | OUTPATIENT
Start: 2021-01-01 | End: 2021-01-01

## 2021-01-01 RX ORDER — ACETAMINOPHEN 500 MG
975 TABLET ORAL ONCE
Refills: 0 | Status: COMPLETED | OUTPATIENT
Start: 2021-01-01 | End: 2021-01-01

## 2021-01-01 RX ORDER — CEFEPIME 1 G/1
2000 INJECTION, POWDER, FOR SOLUTION INTRAMUSCULAR; INTRAVENOUS ONCE
Refills: 0 | Status: COMPLETED | OUTPATIENT
Start: 2021-01-01 | End: 2021-01-01

## 2021-01-01 RX ORDER — INSULIN GLARGINE 100 [IU]/ML
16 INJECTION, SOLUTION SUBCUTANEOUS AT BEDTIME
Refills: 0 | Status: DISCONTINUED | OUTPATIENT
Start: 2021-01-01 | End: 2021-01-01

## 2021-01-01 RX ORDER — REMDESIVIR 5 MG/ML
200 INJECTION INTRAVENOUS EVERY 24 HOURS
Refills: 0 | Status: COMPLETED | OUTPATIENT
Start: 2021-01-01 | End: 2021-01-01

## 2021-01-01 RX ORDER — REMDESIVIR 5 MG/ML
100 INJECTION INTRAVENOUS EVERY 24 HOURS
Refills: 0 | Status: COMPLETED | OUTPATIENT
Start: 2021-01-01 | End: 2021-01-01

## 2021-01-01 RX ORDER — INSULIN LISPRO 100/ML
4 VIAL (ML) SUBCUTANEOUS
Refills: 0 | Status: DISCONTINUED | OUTPATIENT
Start: 2021-01-01 | End: 2021-01-01

## 2021-01-01 RX ORDER — INSULIN LISPRO 100/ML
9 VIAL (ML) SUBCUTANEOUS
Refills: 0 | Status: DISCONTINUED | OUTPATIENT
Start: 2021-01-01 | End: 2021-01-01

## 2021-01-01 RX ORDER — HALOPERIDOL DECANOATE 100 MG/ML
2 INJECTION INTRAMUSCULAR ONCE
Refills: 0 | Status: COMPLETED | OUTPATIENT
Start: 2021-01-01 | End: 2021-01-01

## 2021-01-01 RX ORDER — REMDESIVIR 5 MG/ML
INJECTION INTRAVENOUS
Refills: 0 | Status: COMPLETED | OUTPATIENT
Start: 2021-01-01 | End: 2021-01-01

## 2021-01-01 RX ORDER — INSULIN GLARGINE 100 [IU]/ML
15 INJECTION, SOLUTION SUBCUTANEOUS AT BEDTIME
Refills: 0 | Status: DISCONTINUED | OUTPATIENT
Start: 2021-01-01 | End: 2021-01-01

## 2021-01-01 RX ORDER — INSULIN GLARGINE 100 [IU]/ML
9 INJECTION, SOLUTION SUBCUTANEOUS EVERY MORNING
Refills: 0 | Status: DISCONTINUED | OUTPATIENT
Start: 2021-01-01 | End: 2021-01-01

## 2021-01-01 RX ORDER — INSULIN LISPRO 100/ML
VIAL (ML) SUBCUTANEOUS
Refills: 0 | Status: ACTIVE | OUTPATIENT
Start: 2021-01-01 | End: 2022-02-20

## 2021-01-01 RX ORDER — PANTOPRAZOLE SODIUM 20 MG/1
40 TABLET, DELAYED RELEASE ORAL EVERY 12 HOURS
Refills: 0 | Status: DISCONTINUED | OUTPATIENT
Start: 2021-01-01 | End: 2021-01-01

## 2021-01-01 RX ORDER — INSULIN LISPRO 100/ML
VIAL (ML) SUBCUTANEOUS
Refills: 0 | Status: DISCONTINUED | OUTPATIENT
Start: 2021-01-01 | End: 2021-01-01

## 2021-01-01 RX ORDER — PANTOPRAZOLE SODIUM 20 MG/1
40 TABLET, DELAYED RELEASE ORAL
Refills: 0 | Status: DISCONTINUED | OUTPATIENT
Start: 2021-01-01 | End: 2021-01-01

## 2021-01-01 RX ORDER — CEFPODOXIME PROXETIL 100 MG
200 TABLET ORAL EVERY 12 HOURS
Refills: 0 | Status: DISCONTINUED | OUTPATIENT
Start: 2021-01-01 | End: 2021-01-01

## 2021-01-01 RX ORDER — VALPROIC ACID (AS SODIUM SALT) 250 MG/5ML
250 SOLUTION, ORAL ORAL EVERY 8 HOURS
Refills: 0 | Status: DISCONTINUED | OUTPATIENT
Start: 2021-01-01 | End: 2021-01-01

## 2021-01-01 RX ORDER — INSULIN GLARGINE 100 [IU]/ML
7 INJECTION, SOLUTION SUBCUTANEOUS
Refills: 0 | Status: DISCONTINUED | OUTPATIENT
Start: 2021-01-01 | End: 2021-01-01

## 2021-01-01 RX ORDER — MORPHINE SULFATE 50 MG/1
4 CAPSULE, EXTENDED RELEASE ORAL
Refills: 0 | Status: DISCONTINUED | OUTPATIENT
Start: 2021-01-01 | End: 2021-01-01

## 2021-01-01 RX ADMIN — ENOXAPARIN SODIUM 40 MILLIGRAM(S): 100 INJECTION SUBCUTANEOUS at 11:00

## 2021-01-01 RX ADMIN — DIVALPROEX SODIUM 500 MILLIGRAM(S): 500 TABLET, DELAYED RELEASE ORAL at 14:52

## 2021-01-01 RX ADMIN — MORPHINE SULFATE 2 MILLIGRAM(S): 50 CAPSULE, EXTENDED RELEASE ORAL at 06:23

## 2021-01-01 RX ADMIN — MORPHINE SULFATE 2 MILLIGRAM(S): 50 CAPSULE, EXTENDED RELEASE ORAL at 03:05

## 2021-01-01 RX ADMIN — CHLORHEXIDINE GLUCONATE 1 APPLICATION(S): 213 SOLUTION TOPICAL at 06:05

## 2021-01-01 RX ADMIN — MORPHINE SULFATE 2 MILLIGRAM(S): 50 CAPSULE, EXTENDED RELEASE ORAL at 21:46

## 2021-01-01 RX ADMIN — ATORVASTATIN CALCIUM 20 MILLIGRAM(S): 80 TABLET, FILM COATED ORAL at 21:58

## 2021-01-01 RX ADMIN — LISINOPRIL 5 MILLIGRAM(S): 2.5 TABLET ORAL at 05:11

## 2021-01-01 RX ADMIN — AMPICILLIN SODIUM AND SULBACTAM SODIUM 200 GRAM(S): 250; 125 INJECTION, POWDER, FOR SUSPENSION INTRAMUSCULAR; INTRAVENOUS at 17:20

## 2021-01-01 RX ADMIN — DIVALPROEX SODIUM 500 MILLIGRAM(S): 500 TABLET, DELAYED RELEASE ORAL at 11:00

## 2021-01-01 RX ADMIN — Medication 975 MILLIGRAM(S): at 02:46

## 2021-01-01 RX ADMIN — Medication 3: at 16:34

## 2021-01-01 RX ADMIN — Medication 250 MILLIGRAM(S): at 21:59

## 2021-01-01 RX ADMIN — Medication 6 UNIT(S): at 12:08

## 2021-01-01 RX ADMIN — MORPHINE SULFATE 2 MILLIGRAM(S): 50 CAPSULE, EXTENDED RELEASE ORAL at 10:38

## 2021-01-01 RX ADMIN — PANTOPRAZOLE SODIUM 40 MILLIGRAM(S): 20 TABLET, DELAYED RELEASE ORAL at 19:27

## 2021-01-01 RX ADMIN — Medication 200 MILLIGRAM(S): at 05:41

## 2021-01-01 RX ADMIN — Medication 50 MILLIGRAM(S): at 17:07

## 2021-01-01 RX ADMIN — Medication 1 EACH: at 21:43

## 2021-01-01 RX ADMIN — Medication 50 MILLIGRAM(S): at 17:17

## 2021-01-01 RX ADMIN — INSULIN GLARGINE 16 UNIT(S): 100 INJECTION, SOLUTION SUBCUTANEOUS at 22:10

## 2021-01-01 RX ADMIN — Medication 6 MILLIGRAM(S): at 05:10

## 2021-01-01 RX ADMIN — Medication 6 MILLIGRAM(S): at 05:54

## 2021-01-01 RX ADMIN — ENOXAPARIN SODIUM 40 MILLIGRAM(S): 100 INJECTION SUBCUTANEOUS at 11:16

## 2021-01-01 RX ADMIN — MORPHINE SULFATE 2 MILLIGRAM(S): 50 CAPSULE, EXTENDED RELEASE ORAL at 03:16

## 2021-01-01 RX ADMIN — ENOXAPARIN SODIUM 40 MILLIGRAM(S): 100 INJECTION SUBCUTANEOUS at 12:27

## 2021-01-01 RX ADMIN — Medication 4: at 11:50

## 2021-01-01 RX ADMIN — MORPHINE SULFATE 2 MILLIGRAM(S): 50 CAPSULE, EXTENDED RELEASE ORAL at 05:31

## 2021-01-01 RX ADMIN — CLOPIDOGREL BISULFATE 75 MILLIGRAM(S): 75 TABLET, FILM COATED ORAL at 12:27

## 2021-01-01 RX ADMIN — CLOPIDOGREL BISULFATE 75 MILLIGRAM(S): 75 TABLET, FILM COATED ORAL at 11:42

## 2021-01-01 RX ADMIN — CLOPIDOGREL BISULFATE 75 MILLIGRAM(S): 75 TABLET, FILM COATED ORAL at 11:26

## 2021-01-01 RX ADMIN — LISINOPRIL 5 MILLIGRAM(S): 2.5 TABLET ORAL at 05:20

## 2021-01-01 RX ADMIN — Medication 200 MILLIGRAM(S): at 05:11

## 2021-01-01 RX ADMIN — SODIUM CHLORIDE 500 MILLILITER(S): 9 INJECTION, SOLUTION INTRAVENOUS at 04:56

## 2021-01-01 RX ADMIN — LISINOPRIL 5 MILLIGRAM(S): 2.5 TABLET ORAL at 06:27

## 2021-01-01 RX ADMIN — Medication 25.63 MILLIGRAM(S): at 00:54

## 2021-01-01 RX ADMIN — Medication 3: at 08:10

## 2021-01-01 RX ADMIN — MORPHINE SULFATE 2 MILLIGRAM(S): 50 CAPSULE, EXTENDED RELEASE ORAL at 03:22

## 2021-01-01 RX ADMIN — LISINOPRIL 5 MILLIGRAM(S): 2.5 TABLET ORAL at 06:26

## 2021-01-01 RX ADMIN — AMPICILLIN SODIUM AND SULBACTAM SODIUM 200 GRAM(S): 250; 125 INJECTION, POWDER, FOR SUSPENSION INTRAMUSCULAR; INTRAVENOUS at 17:33

## 2021-01-01 RX ADMIN — Medication 200 MILLIGRAM(S): at 05:18

## 2021-01-01 RX ADMIN — ATORVASTATIN CALCIUM 20 MILLIGRAM(S): 80 TABLET, FILM COATED ORAL at 22:11

## 2021-01-01 RX ADMIN — MORPHINE SULFATE 2 MILLIGRAM(S): 50 CAPSULE, EXTENDED RELEASE ORAL at 17:50

## 2021-01-01 RX ADMIN — Medication 4: at 16:52

## 2021-01-01 RX ADMIN — Medication 10: at 11:15

## 2021-01-01 RX ADMIN — Medication 200 MILLIGRAM(S): at 06:05

## 2021-01-01 RX ADMIN — MORPHINE SULFATE 2 MILLIGRAM(S): 50 CAPSULE, EXTENDED RELEASE ORAL at 22:16

## 2021-01-01 RX ADMIN — Medication 1: at 16:49

## 2021-01-01 RX ADMIN — CLOPIDOGREL BISULFATE 75 MILLIGRAM(S): 75 TABLET, FILM COATED ORAL at 12:42

## 2021-01-01 RX ADMIN — Medication 0: at 17:28

## 2021-01-01 RX ADMIN — Medication 25.63 MILLIGRAM(S): at 18:28

## 2021-01-01 RX ADMIN — AMPICILLIN SODIUM AND SULBACTAM SODIUM 200 GRAM(S): 250; 125 INJECTION, POWDER, FOR SUSPENSION INTRAMUSCULAR; INTRAVENOUS at 05:55

## 2021-01-01 RX ADMIN — ATORVASTATIN CALCIUM 20 MILLIGRAM(S): 80 TABLET, FILM COATED ORAL at 21:44

## 2021-01-01 RX ADMIN — CLOPIDOGREL BISULFATE 75 MILLIGRAM(S): 75 TABLET, FILM COATED ORAL at 17:40

## 2021-01-01 RX ADMIN — Medication 3: at 08:46

## 2021-01-01 RX ADMIN — LISINOPRIL 5 MILLIGRAM(S): 2.5 TABLET ORAL at 05:18

## 2021-01-01 RX ADMIN — Medication 200 MILLIGRAM(S): at 17:17

## 2021-01-01 RX ADMIN — Medication 4: at 12:08

## 2021-01-01 RX ADMIN — CEFTRIAXONE 100 MILLIGRAM(S): 500 INJECTION, POWDER, FOR SOLUTION INTRAMUSCULAR; INTRAVENOUS at 06:43

## 2021-01-01 RX ADMIN — Medication 9 UNIT(S): at 11:15

## 2021-01-01 RX ADMIN — PANTOPRAZOLE SODIUM 40 MILLIGRAM(S): 20 TABLET, DELAYED RELEASE ORAL at 09:34

## 2021-01-01 RX ADMIN — SODIUM CHLORIDE 75 MILLILITER(S): 9 INJECTION, SOLUTION INTRAVENOUS at 23:09

## 2021-01-01 RX ADMIN — MORPHINE SULFATE 2 MILLIGRAM(S): 50 CAPSULE, EXTENDED RELEASE ORAL at 14:09

## 2021-01-01 RX ADMIN — Medication 26.25 MILLIGRAM(S): at 13:09

## 2021-01-01 RX ADMIN — Medication 50 MILLIGRAM(S): at 18:32

## 2021-01-01 RX ADMIN — Medication 6 MILLIGRAM(S): at 06:25

## 2021-01-01 RX ADMIN — Medication 1 EACH: at 20:01

## 2021-01-01 RX ADMIN — Medication 400 MILLIGRAM(S): at 12:18

## 2021-01-01 RX ADMIN — Medication 50 MILLIGRAM(S): at 06:09

## 2021-01-01 RX ADMIN — Medication 8 UNIT(S): at 08:00

## 2021-01-01 RX ADMIN — Medication 6 MILLIGRAM(S): at 05:29

## 2021-01-01 RX ADMIN — Medication 2: at 07:46

## 2021-01-01 RX ADMIN — Medication 26.25 MILLIGRAM(S): at 21:49

## 2021-01-01 RX ADMIN — Medication 200 MILLIGRAM(S): at 06:10

## 2021-01-01 RX ADMIN — DIVALPROEX SODIUM 500 MILLIGRAM(S): 500 TABLET, DELAYED RELEASE ORAL at 11:17

## 2021-01-01 RX ADMIN — CLOPIDOGREL BISULFATE 75 MILLIGRAM(S): 75 TABLET, FILM COATED ORAL at 18:26

## 2021-01-01 RX ADMIN — MORPHINE SULFATE 2 MILLIGRAM(S): 50 CAPSULE, EXTENDED RELEASE ORAL at 13:54

## 2021-01-01 RX ADMIN — Medication 6 MILLIGRAM(S): at 06:28

## 2021-01-01 RX ADMIN — PANTOPRAZOLE SODIUM 40 MILLIGRAM(S): 20 TABLET, DELAYED RELEASE ORAL at 06:28

## 2021-01-01 RX ADMIN — AMPICILLIN SODIUM AND SULBACTAM SODIUM 200 GRAM(S): 250; 125 INJECTION, POWDER, FOR SUSPENSION INTRAMUSCULAR; INTRAVENOUS at 05:38

## 2021-01-01 RX ADMIN — MORPHINE SULFATE 2 MILLIGRAM(S): 50 CAPSULE, EXTENDED RELEASE ORAL at 06:38

## 2021-01-01 RX ADMIN — INSULIN GLARGINE 20 UNIT(S): 100 INJECTION, SOLUTION SUBCUTANEOUS at 22:57

## 2021-01-01 RX ADMIN — Medication 5 MILLIGRAM(S): at 03:57

## 2021-01-01 RX ADMIN — CEFEPIME 100 MILLIGRAM(S): 1 INJECTION, POWDER, FOR SOLUTION INTRAMUSCULAR; INTRAVENOUS at 17:02

## 2021-01-01 RX ADMIN — MORPHINE SULFATE 2 MILLIGRAM(S): 50 CAPSULE, EXTENDED RELEASE ORAL at 15:28

## 2021-01-01 RX ADMIN — Medication 25.63 MILLIGRAM(S): at 22:47

## 2021-01-01 RX ADMIN — Medication 200 MILLIGRAM(S): at 06:28

## 2021-01-01 RX ADMIN — INSULIN GLARGINE 9 UNIT(S): 100 INJECTION, SOLUTION SUBCUTANEOUS at 08:08

## 2021-01-01 RX ADMIN — ENOXAPARIN SODIUM 40 MILLIGRAM(S): 100 INJECTION SUBCUTANEOUS at 12:42

## 2021-01-01 RX ADMIN — ENOXAPARIN SODIUM 40 MILLIGRAM(S): 100 INJECTION SUBCUTANEOUS at 11:26

## 2021-01-01 RX ADMIN — SODIUM CHLORIDE 1000 MILLILITER(S): 9 INJECTION, SOLUTION INTRAVENOUS at 10:30

## 2021-01-01 RX ADMIN — Medication 50 MILLIGRAM(S): at 17:27

## 2021-01-01 RX ADMIN — INSULIN GLARGINE 9 UNIT(S): 100 INJECTION, SOLUTION SUBCUTANEOUS at 08:02

## 2021-01-01 RX ADMIN — CLOPIDOGREL BISULFATE 75 MILLIGRAM(S): 75 TABLET, FILM COATED ORAL at 11:16

## 2021-01-01 RX ADMIN — MORPHINE SULFATE 2 MILLIGRAM(S): 50 CAPSULE, EXTENDED RELEASE ORAL at 07:06

## 2021-01-01 RX ADMIN — MORPHINE SULFATE 2 MILLIGRAM(S): 50 CAPSULE, EXTENDED RELEASE ORAL at 20:15

## 2021-01-01 RX ADMIN — SODIUM CHLORIDE 1000 MILLILITER(S): 9 INJECTION, SOLUTION INTRAVENOUS at 09:33

## 2021-01-01 RX ADMIN — Medication 650 MILLIGRAM(S): at 06:28

## 2021-01-01 RX ADMIN — Medication 25.63 MILLIGRAM(S): at 12:35

## 2021-01-01 RX ADMIN — Medication 400 MILLIGRAM(S): at 11:27

## 2021-01-01 RX ADMIN — MORPHINE SULFATE 2 MILLIGRAM(S): 50 CAPSULE, EXTENDED RELEASE ORAL at 15:42

## 2021-01-01 RX ADMIN — Medication 26.25 MILLIGRAM(S): at 06:27

## 2021-01-01 RX ADMIN — Medication 200 MILLIGRAM(S): at 18:33

## 2021-01-01 RX ADMIN — Medication 50 MILLIGRAM(S): at 05:20

## 2021-01-01 RX ADMIN — Medication 25.63 MILLIGRAM(S): at 06:17

## 2021-01-01 RX ADMIN — Medication 8: at 16:44

## 2021-01-01 RX ADMIN — DIVALPROEX SODIUM 500 MILLIGRAM(S): 500 TABLET, DELAYED RELEASE ORAL at 11:43

## 2021-01-01 RX ADMIN — Medication 10: at 08:00

## 2021-01-01 RX ADMIN — AMPICILLIN SODIUM AND SULBACTAM SODIUM 200 GRAM(S): 250; 125 INJECTION, POWDER, FOR SUSPENSION INTRAMUSCULAR; INTRAVENOUS at 05:17

## 2021-01-01 RX ADMIN — CEFTRIAXONE 100 MILLIGRAM(S): 500 INJECTION, POWDER, FOR SOLUTION INTRAMUSCULAR; INTRAVENOUS at 05:32

## 2021-01-01 RX ADMIN — MORPHINE SULFATE 2 MILLIGRAM(S): 50 CAPSULE, EXTENDED RELEASE ORAL at 22:40

## 2021-01-01 RX ADMIN — Medication 200 MILLIGRAM(S): at 17:13

## 2021-01-01 RX ADMIN — Medication 200 MILLIGRAM(S): at 17:19

## 2021-01-01 RX ADMIN — Medication 50 MILLIGRAM(S): at 06:28

## 2021-01-01 RX ADMIN — ENOXAPARIN SODIUM 40 MILLIGRAM(S): 100 INJECTION SUBCUTANEOUS at 11:03

## 2021-01-01 RX ADMIN — MORPHINE SULFATE 2 MILLIGRAM(S): 50 CAPSULE, EXTENDED RELEASE ORAL at 20:01

## 2021-01-01 RX ADMIN — Medication 50 MILLIGRAM(S): at 06:26

## 2021-01-01 RX ADMIN — INSULIN GLARGINE 16 UNIT(S): 100 INJECTION, SOLUTION SUBCUTANEOUS at 23:25

## 2021-01-01 RX ADMIN — CLOPIDOGREL BISULFATE 75 MILLIGRAM(S): 75 TABLET, FILM COATED ORAL at 12:46

## 2021-01-01 RX ADMIN — Medication 50 MILLIGRAM(S): at 06:06

## 2021-01-01 RX ADMIN — SODIUM CHLORIDE 1000 MILLILITER(S): 9 INJECTION INTRAMUSCULAR; INTRAVENOUS; SUBCUTANEOUS at 00:33

## 2021-01-01 RX ADMIN — Medication 8: at 16:55

## 2021-01-01 RX ADMIN — Medication 26.25 MILLIGRAM(S): at 05:17

## 2021-01-01 RX ADMIN — Medication 400 MILLIGRAM(S): at 12:42

## 2021-01-01 RX ADMIN — AMPICILLIN SODIUM AND SULBACTAM SODIUM 200 GRAM(S): 250; 125 INJECTION, POWDER, FOR SUSPENSION INTRAMUSCULAR; INTRAVENOUS at 06:17

## 2021-01-01 RX ADMIN — I.V. FAT EMULSION 31.3 GM/KG/DAY: 20 EMULSION INTRAVENOUS at 20:01

## 2021-01-01 RX ADMIN — SODIUM CHLORIDE 100 MILLILITER(S): 9 INJECTION, SOLUTION INTRAVENOUS at 18:38

## 2021-01-01 RX ADMIN — ENOXAPARIN SODIUM 40 MILLIGRAM(S): 100 INJECTION SUBCUTANEOUS at 17:40

## 2021-01-01 RX ADMIN — CHLORHEXIDINE GLUCONATE 1 APPLICATION(S): 213 SOLUTION TOPICAL at 05:19

## 2021-01-01 RX ADMIN — Medication 9 UNIT(S): at 17:28

## 2021-01-01 RX ADMIN — Medication 6 MILLIGRAM(S): at 06:11

## 2021-01-01 RX ADMIN — Medication 26.25 MILLIGRAM(S): at 13:40

## 2021-01-01 RX ADMIN — CEFEPIME 100 MILLIGRAM(S): 1 INJECTION, POWDER, FOR SOLUTION INTRAMUSCULAR; INTRAVENOUS at 11:14

## 2021-01-01 RX ADMIN — DIVALPROEX SODIUM 500 MILLIGRAM(S): 500 TABLET, DELAYED RELEASE ORAL at 13:20

## 2021-01-01 RX ADMIN — ATORVASTATIN CALCIUM 20 MILLIGRAM(S): 80 TABLET, FILM COATED ORAL at 22:10

## 2021-01-01 RX ADMIN — CEFTRIAXONE 100 MILLIGRAM(S): 500 INJECTION, POWDER, FOR SOLUTION INTRAMUSCULAR; INTRAVENOUS at 07:39

## 2021-01-01 RX ADMIN — SODIUM CHLORIDE 1000 MILLILITER(S): 9 INJECTION, SOLUTION INTRAVENOUS at 22:00

## 2021-01-01 RX ADMIN — SODIUM CHLORIDE 250 MILLILITER(S): 9 INJECTION INTRAMUSCULAR; INTRAVENOUS; SUBCUTANEOUS at 04:55

## 2021-01-01 RX ADMIN — MORPHINE SULFATE 2 MILLIGRAM(S): 50 CAPSULE, EXTENDED RELEASE ORAL at 20:16

## 2021-01-01 RX ADMIN — LISINOPRIL 5 MILLIGRAM(S): 2.5 TABLET ORAL at 12:49

## 2021-01-01 RX ADMIN — Medication 26.25 MILLIGRAM(S): at 05:28

## 2021-01-01 RX ADMIN — SODIUM CHLORIDE 75 MILLILITER(S): 9 INJECTION, SOLUTION INTRAVENOUS at 22:10

## 2021-01-01 RX ADMIN — PANTOPRAZOLE SODIUM 40 MILLIGRAM(S): 20 TABLET, DELAYED RELEASE ORAL at 17:33

## 2021-01-01 RX ADMIN — Medication 25.63 MILLIGRAM(S): at 05:42

## 2021-01-01 RX ADMIN — Medication 3: at 12:02

## 2021-01-01 RX ADMIN — Medication 650 MILLIGRAM(S): at 21:47

## 2021-01-01 RX ADMIN — DIVALPROEX SODIUM 500 MILLIGRAM(S): 500 TABLET, DELAYED RELEASE ORAL at 12:42

## 2021-01-01 RX ADMIN — REMDESIVIR 500 MILLIGRAM(S): 5 INJECTION INTRAVENOUS at 17:18

## 2021-01-01 RX ADMIN — LISINOPRIL 5 MILLIGRAM(S): 2.5 TABLET ORAL at 06:06

## 2021-01-01 RX ADMIN — ENOXAPARIN SODIUM 40 MILLIGRAM(S): 100 INJECTION SUBCUTANEOUS at 11:17

## 2021-01-01 RX ADMIN — Medication 50 MILLIGRAM(S): at 17:02

## 2021-01-01 RX ADMIN — Medication 50 MILLIGRAM(S): at 05:18

## 2021-01-01 RX ADMIN — I.V. FAT EMULSION 31.3 GM/KG/DAY: 20 EMULSION INTRAVENOUS at 21:43

## 2021-01-01 RX ADMIN — CEFEPIME 100 MILLIGRAM(S): 1 INJECTION, POWDER, FOR SOLUTION INTRAMUSCULAR; INTRAVENOUS at 22:06

## 2021-01-01 RX ADMIN — DIVALPROEX SODIUM 500 MILLIGRAM(S): 500 TABLET, DELAYED RELEASE ORAL at 11:27

## 2021-01-01 RX ADMIN — MORPHINE SULFATE 2 MILLIGRAM(S): 50 CAPSULE, EXTENDED RELEASE ORAL at 03:20

## 2021-01-01 RX ADMIN — Medication 50 MILLIGRAM(S): at 17:13

## 2021-01-01 RX ADMIN — Medication 50 MILLIGRAM(S): at 17:18

## 2021-01-01 RX ADMIN — LISINOPRIL 5 MILLIGRAM(S): 2.5 TABLET ORAL at 06:28

## 2021-01-01 RX ADMIN — Medication 50 MILLIGRAM(S): at 17:49

## 2021-01-01 RX ADMIN — ATORVASTATIN CALCIUM 20 MILLIGRAM(S): 80 TABLET, FILM COATED ORAL at 22:07

## 2021-01-01 RX ADMIN — Medication 6 MILLIGRAM(S): at 06:16

## 2021-01-01 RX ADMIN — CLOPIDOGREL BISULFATE 75 MILLIGRAM(S): 75 TABLET, FILM COATED ORAL at 12:17

## 2021-01-01 RX ADMIN — REMDESIVIR 500 MILLIGRAM(S): 5 INJECTION INTRAVENOUS at 12:17

## 2021-01-01 RX ADMIN — SODIUM CHLORIDE 1000 MILLILITER(S): 9 INJECTION INTRAMUSCULAR; INTRAVENOUS; SUBCUTANEOUS at 06:56

## 2021-01-01 RX ADMIN — CHLORHEXIDINE GLUCONATE 1 APPLICATION(S): 213 SOLUTION TOPICAL at 06:14

## 2021-01-01 RX ADMIN — Medication 200 MILLIGRAM(S): at 17:50

## 2021-01-01 RX ADMIN — Medication 6 MILLIGRAM(S): at 06:43

## 2021-01-01 RX ADMIN — ENOXAPARIN SODIUM 40 MILLIGRAM(S): 100 INJECTION SUBCUTANEOUS at 12:29

## 2021-01-01 RX ADMIN — Medication 50 MILLIGRAM(S): at 05:44

## 2021-01-01 RX ADMIN — Medication 200 MILLIGRAM(S): at 17:02

## 2021-01-01 RX ADMIN — SODIUM CHLORIDE 100 MILLILITER(S): 9 INJECTION, SOLUTION INTRAVENOUS at 03:16

## 2021-01-01 RX ADMIN — ATORVASTATIN CALCIUM 20 MILLIGRAM(S): 80 TABLET, FILM COATED ORAL at 21:26

## 2021-01-01 RX ADMIN — Medication 25 GRAM(S): at 21:25

## 2021-01-01 RX ADMIN — ATORVASTATIN CALCIUM 20 MILLIGRAM(S): 80 TABLET, FILM COATED ORAL at 23:24

## 2021-01-01 RX ADMIN — Medication 4: at 08:04

## 2021-01-01 RX ADMIN — Medication 400 MILLIGRAM(S): at 12:29

## 2021-01-01 RX ADMIN — Medication 6 MILLIGRAM(S): at 05:21

## 2021-01-01 RX ADMIN — SODIUM CHLORIDE 75 MILLILITER(S): 9 INJECTION, SOLUTION INTRAVENOUS at 14:21

## 2021-01-01 RX ADMIN — MORPHINE SULFATE 2 MILLIGRAM(S): 50 CAPSULE, EXTENDED RELEASE ORAL at 17:35

## 2021-01-01 RX ADMIN — MORPHINE SULFATE 2 MILLIGRAM(S): 50 CAPSULE, EXTENDED RELEASE ORAL at 22:46

## 2021-01-01 RX ADMIN — MORPHINE SULFATE 2 MILLIGRAM(S): 50 CAPSULE, EXTENDED RELEASE ORAL at 10:08

## 2021-01-01 RX ADMIN — Medication 400 MILLIGRAM(S): at 11:38

## 2021-01-01 RX ADMIN — CHLORHEXIDINE GLUCONATE 1 APPLICATION(S): 213 SOLUTION TOPICAL at 06:25

## 2021-01-01 RX ADMIN — PANTOPRAZOLE SODIUM 40 MILLIGRAM(S): 20 TABLET, DELAYED RELEASE ORAL at 17:23

## 2021-01-01 RX ADMIN — DIVALPROEX SODIUM 500 MILLIGRAM(S): 500 TABLET, DELAYED RELEASE ORAL at 18:26

## 2021-01-01 RX ADMIN — PANTOPRAZOLE SODIUM 40 MILLIGRAM(S): 20 TABLET, DELAYED RELEASE ORAL at 22:47

## 2021-01-01 RX ADMIN — Medication 2 MILLIGRAM(S): at 02:15

## 2021-01-01 RX ADMIN — ATORVASTATIN CALCIUM 20 MILLIGRAM(S): 80 TABLET, FILM COATED ORAL at 21:29

## 2021-01-01 RX ADMIN — Medication 6 MILLIGRAM(S): at 05:50

## 2021-01-01 RX ADMIN — Medication 6 MILLIGRAM(S): at 05:20

## 2021-01-01 RX ADMIN — MORPHINE SULFATE 4 MILLIGRAM(S): 50 CAPSULE, EXTENDED RELEASE ORAL at 01:36

## 2021-01-01 RX ADMIN — PANTOPRAZOLE SODIUM 40 MILLIGRAM(S): 20 TABLET, DELAYED RELEASE ORAL at 06:16

## 2021-01-01 RX ADMIN — Medication 650 MILLIGRAM(S): at 13:59

## 2021-01-01 RX ADMIN — CEFEPIME 100 MILLIGRAM(S): 1 INJECTION, POWDER, FOR SOLUTION INTRAMUSCULAR; INTRAVENOUS at 22:00

## 2021-01-01 RX ADMIN — CEFTRIAXONE 100 MILLIGRAM(S): 500 INJECTION, POWDER, FOR SOLUTION INTRAMUSCULAR; INTRAVENOUS at 06:26

## 2021-01-01 RX ADMIN — PANTOPRAZOLE SODIUM 40 MILLIGRAM(S): 20 TABLET, DELAYED RELEASE ORAL at 05:23

## 2021-01-01 RX ADMIN — Medication 2: at 17:11

## 2021-01-01 RX ADMIN — CLOPIDOGREL BISULFATE 75 MILLIGRAM(S): 75 TABLET, FILM COATED ORAL at 11:00

## 2021-01-01 RX ADMIN — Medication 2: at 17:12

## 2021-01-01 RX ADMIN — DIVALPROEX SODIUM 500 MILLIGRAM(S): 500 TABLET, DELAYED RELEASE ORAL at 12:29

## 2021-01-01 RX ADMIN — AMPICILLIN SODIUM AND SULBACTAM SODIUM 200 GRAM(S): 250; 125 INJECTION, POWDER, FOR SUSPENSION INTRAMUSCULAR; INTRAVENOUS at 05:30

## 2021-01-01 RX ADMIN — PANTOPRAZOLE SODIUM 40 MILLIGRAM(S): 20 TABLET, DELAYED RELEASE ORAL at 05:28

## 2021-01-01 RX ADMIN — Medication 2: at 11:42

## 2021-01-01 RX ADMIN — REMDESIVIR 500 MILLIGRAM(S): 5 INJECTION INTRAVENOUS at 13:27

## 2021-01-01 RX ADMIN — INSULIN GLARGINE 9 UNIT(S): 100 INJECTION, SOLUTION SUBCUTANEOUS at 07:46

## 2021-01-01 RX ADMIN — PANTOPRAZOLE SODIUM 40 MILLIGRAM(S): 20 TABLET, DELAYED RELEASE ORAL at 05:18

## 2021-01-01 RX ADMIN — Medication 3: at 16:28

## 2021-01-01 RX ADMIN — CEFEPIME 100 MILLIGRAM(S): 1 INJECTION, POWDER, FOR SOLUTION INTRAMUSCULAR; INTRAVENOUS at 05:18

## 2021-01-01 RX ADMIN — Medication 650 MILLIGRAM(S): at 04:54

## 2021-01-01 RX ADMIN — PANTOPRAZOLE SODIUM 40 MILLIGRAM(S): 20 TABLET, DELAYED RELEASE ORAL at 17:04

## 2021-01-01 RX ADMIN — Medication 50 MILLIGRAM(S): at 17:20

## 2021-01-01 RX ADMIN — AMPICILLIN SODIUM AND SULBACTAM SODIUM 200 GRAM(S): 250; 125 INJECTION, POWDER, FOR SUSPENSION INTRAMUSCULAR; INTRAVENOUS at 17:10

## 2021-01-01 RX ADMIN — REMDESIVIR 500 MILLIGRAM(S): 5 INJECTION INTRAVENOUS at 12:23

## 2021-01-01 RX ADMIN — HALOPERIDOL DECANOATE 0.5 MILLIGRAM(S): 100 INJECTION INTRAMUSCULAR at 12:28

## 2021-01-01 RX ADMIN — Medication 4 UNIT(S): at 16:52

## 2021-01-01 RX ADMIN — DIVALPROEX SODIUM 500 MILLIGRAM(S): 500 TABLET, DELAYED RELEASE ORAL at 11:39

## 2021-01-01 RX ADMIN — Medication 10: at 11:42

## 2021-01-01 RX ADMIN — Medication 50 MILLIGRAM(S): at 05:10

## 2021-01-01 RX ADMIN — Medication 1: at 11:07

## 2021-01-01 RX ADMIN — HALOPERIDOL DECANOATE 0.5 MILLIGRAM(S): 100 INJECTION INTRAMUSCULAR at 22:46

## 2021-01-01 RX ADMIN — Medication 5 MILLIGRAM(S): at 11:06

## 2021-01-01 RX ADMIN — INSULIN GLARGINE 9 UNIT(S): 100 INJECTION, SOLUTION SUBCUTANEOUS at 07:51

## 2021-01-01 RX ADMIN — Medication 6 MILLIGRAM(S): at 05:18

## 2021-01-01 RX ADMIN — Medication 26.25 MILLIGRAM(S): at 21:45

## 2021-01-01 RX ADMIN — Medication 3: at 17:41

## 2021-01-01 RX ADMIN — MORPHINE SULFATE 2 MILLIGRAM(S): 50 CAPSULE, EXTENDED RELEASE ORAL at 03:52

## 2021-01-01 RX ADMIN — Medication 6 MILLIGRAM(S): at 05:59

## 2021-01-01 RX ADMIN — Medication 40 MILLIEQUIVALENT(S): at 11:04

## 2021-01-01 RX ADMIN — PANTOPRAZOLE SODIUM 40 MILLIGRAM(S): 20 TABLET, DELAYED RELEASE ORAL at 08:43

## 2021-01-01 RX ADMIN — AMPICILLIN SODIUM AND SULBACTAM SODIUM 200 GRAM(S): 250; 125 INJECTION, POWDER, FOR SUSPENSION INTRAMUSCULAR; INTRAVENOUS at 06:26

## 2021-01-01 RX ADMIN — ATORVASTATIN CALCIUM 20 MILLIGRAM(S): 80 TABLET, FILM COATED ORAL at 22:58

## 2021-01-01 RX ADMIN — Medication 4: at 11:24

## 2021-01-01 RX ADMIN — Medication 2: at 12:26

## 2021-01-01 RX ADMIN — INSULIN GLARGINE 9 UNIT(S): 100 INJECTION, SOLUTION SUBCUTANEOUS at 08:11

## 2021-01-01 RX ADMIN — CHLORHEXIDINE GLUCONATE 1 APPLICATION(S): 213 SOLUTION TOPICAL at 06:43

## 2021-01-01 RX ADMIN — PANTOPRAZOLE SODIUM 40 MILLIGRAM(S): 20 TABLET, DELAYED RELEASE ORAL at 17:13

## 2021-01-01 RX ADMIN — CLOPIDOGREL BISULFATE 75 MILLIGRAM(S): 75 TABLET, FILM COATED ORAL at 11:17

## 2021-01-01 RX ADMIN — Medication 1: at 07:51

## 2021-01-01 RX ADMIN — Medication 50 MILLIGRAM(S): at 17:32

## 2021-01-01 RX ADMIN — REMDESIVIR 500 MILLIGRAM(S): 5 INJECTION INTRAVENOUS at 12:30

## 2021-01-01 RX ADMIN — AMPICILLIN SODIUM AND SULBACTAM SODIUM 200 GRAM(S): 250; 125 INJECTION, POWDER, FOR SUSPENSION INTRAMUSCULAR; INTRAVENOUS at 17:11

## 2021-01-01 RX ADMIN — AMPICILLIN SODIUM AND SULBACTAM SODIUM 200 GRAM(S): 250; 125 INJECTION, POWDER, FOR SUSPENSION INTRAMUSCULAR; INTRAVENOUS at 05:27

## 2021-01-01 RX ADMIN — MORPHINE SULFATE 2 MILLIGRAM(S): 50 CAPSULE, EXTENDED RELEASE ORAL at 03:46

## 2021-01-01 RX ADMIN — AMPICILLIN SODIUM AND SULBACTAM SODIUM 200 GRAM(S): 250; 125 INJECTION, POWDER, FOR SUSPENSION INTRAMUSCULAR; INTRAVENOUS at 17:12

## 2021-01-01 RX ADMIN — Medication 8: at 12:06

## 2021-01-01 RX ADMIN — ATORVASTATIN CALCIUM 20 MILLIGRAM(S): 80 TABLET, FILM COATED ORAL at 22:46

## 2021-01-01 RX ADMIN — HALOPERIDOL DECANOATE 2 MILLIGRAM(S): 100 INJECTION INTRAMUSCULAR at 00:04

## 2021-01-01 RX ADMIN — MORPHINE SULFATE 2 MILLIGRAM(S): 50 CAPSULE, EXTENDED RELEASE ORAL at 20:00

## 2021-01-01 RX ADMIN — DIVALPROEX SODIUM 500 MILLIGRAM(S): 500 TABLET, DELAYED RELEASE ORAL at 17:41

## 2021-01-01 RX ADMIN — ENOXAPARIN SODIUM 40 MILLIGRAM(S): 100 INJECTION SUBCUTANEOUS at 11:43

## 2021-01-01 RX ADMIN — PANTOPRAZOLE SODIUM 40 MILLIGRAM(S): 20 TABLET, DELAYED RELEASE ORAL at 05:42

## 2021-01-01 RX ADMIN — ENOXAPARIN SODIUM 40 MILLIGRAM(S): 100 INJECTION SUBCUTANEOUS at 12:16

## 2021-01-01 RX ADMIN — Medication 6 MILLIGRAM(S): at 05:42

## 2021-01-01 RX ADMIN — Medication 3: at 11:42

## 2021-01-01 RX ADMIN — Medication 25.63 MILLIGRAM(S): at 11:12

## 2021-01-01 RX ADMIN — LISINOPRIL 5 MILLIGRAM(S): 2.5 TABLET ORAL at 11:18

## 2021-01-01 RX ADMIN — LISINOPRIL 5 MILLIGRAM(S): 2.5 TABLET ORAL at 05:41

## 2021-01-01 RX ADMIN — AMPICILLIN SODIUM AND SULBACTAM SODIUM 200 GRAM(S): 250; 125 INJECTION, POWDER, FOR SUSPENSION INTRAMUSCULAR; INTRAVENOUS at 17:42

## 2021-01-01 RX ADMIN — SODIUM CHLORIDE 100 MILLILITER(S): 9 INJECTION, SOLUTION INTRAVENOUS at 21:37

## 2021-01-01 RX ADMIN — Medication 25 GRAM(S): at 09:04

## 2021-01-01 RX ADMIN — SODIUM CHLORIDE 75 MILLILITER(S): 9 INJECTION, SOLUTION INTRAVENOUS at 10:39

## 2021-01-01 RX ADMIN — ENOXAPARIN SODIUM 40 MILLIGRAM(S): 100 INJECTION SUBCUTANEOUS at 12:47

## 2021-01-01 RX ADMIN — Medication 200 MILLIGRAM(S): at 17:07

## 2021-03-15 NOTE — ED PROVIDER NOTE - CARE PLAN
Principal Discharge DX:	AMS (altered mental status)  Secondary Diagnosis:	Fever  Secondary Diagnosis:	UTI (urinary tract infection)   Principal Discharge DX:	AMS (altered mental status)  Secondary Diagnosis:	Fever  Secondary Diagnosis:	UTI (urinary tract infection)  Secondary Diagnosis:	Hypoxia  Secondary Diagnosis:	COVID-19

## 2021-03-15 NOTE — ED PROVIDER NOTE - ATTENDING CONTRIBUTION TO CARE
89 yr old m w/ a pmh significant for CVA lower extremity deficits and RUE deficits, dm, SDH, epilepsy who presents today with lethargy. As per son, the last 2 hrs pt was noted to be more lethargic, febrile and also was not as responsive as he usually is. Pts son denies any sick contacts or any recent medical complaints.     VITAL SIGNS: I have reviewed nursing notes and confirm.  CONSTITUTIONAL: non-toxic, well appearing  SKIN: no rash, no petechiae.  EYES: PERRL, EOMI, pink conjunctiva, anicteric  ENT: tongue midline, no exudates, MMM  NECK: Supple; no meningismus, no JVD  CARD: RRR, no murmurs, equal radial pulses bilaterally 2+  RESP: CTAB, no respiratory distress  ABD: Soft, non-tender, non-distended, no peritoneal signs, no HSM, no CVA tenderness  EXT: Normal ROM x4. No edema. No calves tenderness  NEURO: Alert, oriented x1 (pt is usually AAOX3).    a/p  89 yr old m that presents with lethargy   -labs  -ekg  -imaging  -pt called as a stroke code, neuro at bedside  -consider admission

## 2021-03-15 NOTE — CONSULT NOTE ADULT - ATTENDING COMMENTS
History, events, data and scans reviewed. Patient examined at bedside on 03/15/2021. I agreed and approved plan of care as outlined above.

## 2021-03-15 NOTE — ED PROVIDER NOTE - OBJECTIVE STATEMENT
This is an 89 yo M HTN, HLD, Epilepsy who presents with acute change in mental status. Shortness of breath was also reported. About two hours prior to arrival, the patient was discussing stocks with his son when he suddenly became obtunded. The son did not notice any dysarthria, aphasia, or worsened weakness (at baseline the patient has some right sided weakness and does not ambulate independently). EMS arrived and noted the patient to be febrile at 103 F and hypoxic at 80% on room air and on NRB saturates only 91-92%. No other constitutional symptoms are endorsed by the patient This is an 88 yo M HTN, HLD, Epilepsy ( on VPA and carbamazepine) , CVA with R sided deficits who presents with acute change in mental status per son at bedside,  About two hours prior to arrival, the patient was discussing stocks with his son when he suddenly became obtunded. The son did not notice any dysarthria, aphasia, or worsened weakness . Per EMS on arrival pt was febrile to 103 F and hypoxic at 80% on room air and on NRB saturates only 91-92%.

## 2021-03-15 NOTE — ED PROVIDER NOTE - PHYSICAL EXAMINATION
CONSTITUTIONAL:  lethargic , thin   SKIN: warm, dry  HEAD: Normocephalic; atraumatic.  EYES: PERRL, EOMI, normal sclera and conjunctiva   ENT: No nasal discharge; airway clear.  NECK: Supple; non tender.  CARD:  Regular rate and rhythm.   RESP b/l crackles in b/l bases.   LYMPH: No acute cervical adenopathy.

## 2021-03-15 NOTE — ED ADULT NURSE NOTE - OBJECTIVE STATEMENT
Pt presents to ED c/o lethargy and unresponsiveness. Last well known as per son was 6pm. Stroke code called 2043. Pt has hx of R CVA with residual weakness. Pt brought to CT stat. Pt is normally axox3 baseliine; pt responds to pain and verbal stimuli. Pt is febrile with rectal temp of 104.5 Tylenol supp given stat. Pt hypoxic in the 80s; pt placed on nonrebreather sat 97%. Neuro checks done; NIH 9. Cardiac and 02 monitoring maintained. Fall precautions initiated.

## 2021-03-15 NOTE — ED PROVIDER NOTE - CLINICAL SUMMARY MEDICAL DECISION MAKING FREE TEXT BOX
89 yr old m who presents due to sob and lethargy. stroke code called. labs, ekg, imaging obtained. pt covid +. pt admitted to medicine.

## 2021-03-15 NOTE — STROKE CODE NOTE - ER ARRIVAL: DATE/TIME
15-Mar-2021 20:37 Graft Cartilage Fenestration Text: The cartilage was fenestrated with a 2mm punch biopsy to help facilitate graft survival and healing.

## 2021-03-15 NOTE — ED PROVIDER NOTE - PROGRESS NOTE DETAILS
Todd: spoke to ICU fellow (Emilee) regarding pt. pt cleared to go to the floors. will admit pt to telemetry.

## 2021-03-15 NOTE — ED ADULT NURSE NOTE - CHIEF COMPLAINT QUOTE
Patient presents to ED c/o new onset AMS, shortness of breath and lethargy for the past 2 hrs. Stroke code intiiated.

## 2021-03-15 NOTE — ED ADULT TRIAGE NOTE - CHIEF COMPLAINT QUOTE
Patient presents to ED c/o AMS, shortness of breath and lethargy for the past 2 hrs Patient presents to ED c/o new onset AMS, shortness of breath and lethargy for the past 2 hrs. Patient presents to ED c/o new onset AMS, shortness of breath and lethargy for the past 2 hrs. Stroke code intiiated.

## 2021-03-15 NOTE — ED ADULT NURSE NOTE - NSIMPLEMENTINTERV_GEN_ALL_ED
Implemented All Fall with Harm Risk Interventions:  [place] to call system. Call bell, personal items and telephone within reach. Instruct patient to call for assistance. Room bathroom lighting operational. Non-slip footwear when patient is off stretcher. Physically safe environment: no spills, clutter or unnecessary equipment. Stretcher in lowest position, wheels locked, appropriate side rails in place. Provide visual cue, wrist band, yellow gown, etc. Monitor gait and stability. Monitor for mental status changes and reorient to person, place, and time. Review medications for side effects contributing to fall risk. Reinforce activity limits and safety measures with patient and family. Provide visual clues: red socks.

## 2021-03-16 NOTE — H&P ADULT - NSICDXPASTSURGICALHX_GEN_ALL_CORE_FT
PAST SURGICAL HISTORY:  Acute subdural hematoma With surgical intervention    H/O hernia repair     History of total hip replacement, right

## 2021-03-16 NOTE — PROGRESS NOTE ADULT - ASSESSMENT
88 yo M with PMHx of HTN, HLD, Epilepsy ( on VPA and carbamazepine) , CVA with R sided deficits presented with acute change in mental status per son. Sun antedates that about two hours prior to arrival, the patient was perfectly fine and discussing stocks with his son. Suddenly became obtunded. The son did not notice any dysarthria, aphasia, or worsened weakness. On arrival pt was febrile to 103 F and hypoxic at 80% on room air and on NRB saturates only 91-92.  Called the house and spoke to the aide who said that the patient did not have any temperate at home and was doing well until Monday when his po intake decreased. The patient has family visiting him all the time and they went to a dinner in [place] over the weekend as well.     Patient was found to be febrile and hypoxic on arrival. COVID swab came out positive. Blood work revealed slight anaemia, elevated lactate, troponin, a positive UA, therapeutic levels of AED. CT head and CTA head and neck did not show any acute stroke or occlusion.  Patient satted well on 4L NC. Patient is being admitted for stroke w/u and COVID19 Hypoxia.        #COVID-19 Hypoxia/early phase:  # ARF secondary to COVID-19 PNA, SIRS present on admission  - Maintain on airborne and contact isolation.  - supplement with oxygen, oxygen saturation above 88%, c/w 4L NC, wean off as tolerated  - Fu inflammatory markers: ESR, CRP, LDH, Procalcitonin, D-Dimer, Ferritin, .  - AC per protocol: Lovenox 40 mg SQ BID BMI > 30.   - continue with Remdesivir 200mg first day, followed by 100mg daily for 4 days, - monitor LFTS  - continue with Decadron 6mg qd for total of 10 days or until hospital discharge,   - monitor FS  - order 2 units of plasma, will get covid Igs and type and screen   - Incentive spirometry and proning 16h/day, as tolerated      #Positive UA/Lactate elevation  -patient AOX1, cant complain of  symptoms if any  -will start on rocpehin for now, f/u urine cultures  -f/u repeat lactate in am  -Will give gentle hydration, patient also has decreased po intake    #Elevated troponin  -EKG with PACs, f/u repeat troponin in am    #AMS-DOUBT stroke:  -neurology on board, CT head and CTA head and neck did not show any acute stroke or occlusion.  AEDs therapuetic  lipid profile, a1c  echo and MR head when stable from COVID-19 point of view  continue ASA Plavix and steroids    HTN:  C/w lisinopril and metoprolol    DVT PPX  FULL CODE  CHG   AIT

## 2021-03-16 NOTE — H&P ADULT - ATTENDING COMMENTS
88 yo M with PMHx of HTN, HLD, Epilepsy ( on VPA and carbamazepine) , CVA with R sided deficits presented with acute change in mental status per son. Son antedates that about two hours prior to arrival, the patient was perfectly fine and discussing stocks with his son. Suddenly became obtunded. The son did not notice any dysarthria, aphasia, or worsened weakness. On arrival pt was febrile to 103 F and hypoxic at 80% on room air and on NRB saturates only 91-92.  Called the house and spoke to the aide who said that the patient did not have any temperature at home and was doing well until Monday when his po intake decreased. The patient has family visiting him all the time and they went to a dinner in [place] over the weekend as well.     Patient was found to be febrile and hypoxic on arrival. COVID swab came out positive. Blood work revealed slight anaemia, elevated lactate, troponin, a positive UA, therapeutic levels of AED. CT head and CTA head and neck did not show any acute stroke or occlusion.  Patient satted well on 4L NC. Patient is being admitted for stroke w/u and COVID19 Hypoxia.        #Acute hypoxemic respiratory failure 2/2 COVID-19 PNA  #Covid Encephalopathy   #UTI   # Sepsis present on admission    - Fu inflammatory markers: ESR, CRP, LDH, Procalcitonin, Ferritin. D-Dimer 347   - AC per protocol: Lovenox 40 mg SQ BID BMI > 30.   - continue with Remdesivir 200mg first day, followed by 100mg daily for 4 days, - monitor LFTS  - continue with Decadron 6mg qd for total of 10 days or until hospital discharge,   - monitor FS  -If Ab neg > administer plasma  - Incentive spirometry and proning 16h/day, as tolerated    3/16: 4L NC. comfortable. sleepy but arousable. still febrile. AAO x 2-3. Very well kempt elderly gentleman.     #Positive UA/UTI    -will start on rocpehin for now, f/u urine cultures  -f/u repeat lactate in am  -Will give gentle hydration, patient also has decreased po intake    #Elevated troponin 0.02  -EKG with PACs. very mild. nothing more to do.    #h/o epilepsy:   c/w AEDs per Neuro  Stroke ruled out by neuro on admission    HTN:  C/w lisinopril and metoprolol    DVT PPX  FULL CODE  CHG   AIT  Dispo: Active. encephalopathic. 4L Nc

## 2021-03-16 NOTE — PROGRESS NOTE ADULT - SUBJECTIVE AND OBJECTIVE BOX
SUBJECTIVE:    Patient is a 89y old Male who presents with a chief complaint of sob (16 Mar 2021 02:55)    Currently admitted to medicine with the primary diagnosis of AMS (altered mental status)       Today is hospital day . This morning he is resting comfortably in bed and reports no new issues or overnight events.     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    Diabetes mellitus    HTN (hypertension)    Seizures    CVA (cerebral vascular accident)  x 4    Acute subdural hematoma  With surgical intervention    History of total hip replacement, right    H/O hernia repair        ALLERGIES:  Ambien (Other)  Dilantin (Unknown)  phenobarbital (Unknown)    MEDICATIONS:  STANDING MEDICATIONS  atorvastatin 20 milliGRAM(s) Oral at bedtime  carBAMazepine ER Tablet 400 milliGRAM(s) Oral daily  cefTRIAXone   IVPB 1000 milliGRAM(s) IV Intermittent every 24 hours  chlorhexidine 4% Liquid 1 Application(s) Topical <User Schedule>  clopidogrel Tablet 75 milliGRAM(s) Oral daily  dexAMETHasone  Injectable 6 milliGRAM(s) IV Push daily  diVALproex  milliGRAM(s) Oral daily  enoxaparin Injectable 40 milliGRAM(s) SubCutaneous daily  lisinopril 5 milliGRAM(s) Oral daily  metoprolol tartrate 50 milliGRAM(s) Oral two times a day  remdesivir  IVPB   IV Intermittent   remdesivir  IVPB 200 milliGRAM(s) IV Intermittent every 24 hours    PRN MEDICATIONS    VITALS:   T(F): 98.9  HR: 61  BP: 112/50  RR: 18  SpO2: 93%    LABS:                        11.8   2.90  )-----------( 144      ( 15 Mar 2021 21:37 )             34.7     03-15    137  |  98  |  30<H>  ----------------------------<  256<H>  4.6   |  22  |  1.5    Ca    8.9      15 Mar 2021 21:37    TPro  6.7  /  Alb  3.7  /  TBili  <0.2  /  DBili  x   /  AST  41  /  ALT  17  /  AlkPhos  68  03-15    PT/INR - ( 15 Mar 2021 21:37 )   PT: 18.90 sec;   INR: 1.64 ratio         PTT - ( 15 Mar 2021 21:37 )  PTT:37.1 sec  Urinalysis Basic - ( 15 Mar 2021 21:00 )    Color: Yellow / Appearance: Turbid / S.027 / pH: x  Gluc: x / Ketone: Small  / Bili: Negative / Urobili: <2 mg/dL   Blood: x / Protein: 100 mg/dL / Nitrite: Negative   Leuk Esterase: Large / RBC: 1 /HPF /  /HPF   Sq Epi: x / Non Sq Epi: 1 /HPF / Bacteria: Many        Lactate, Blood: 3.7 mmol/L <H> (03-15-21 @ 21:38)  Troponin T, Serum: 0.02 ng/mL <H> (03-15-21 @ 21:37)      CARDIAC MARKERS ( 15 Mar 2021 21:37 )  x     / 0.02 ng/mL / x     / x     / x          RADIOLOGY:    PHYSICAL EXAM:  GEN: No acute distress  PULM/CHEST: decreased breath sound bilateral  CVS: Regular rate and rhythm, S1-S2, no murmurs  ABD: Soft, non-tender, non-distended, +BS  EXT: No edema  NEURO: AAOx3    Stevens Catheter:   Indwelling Urethral Catheter:     Connect To:  Straight Drainage/Gravity    Indication:  Improved Comfort Care (03-15-21 @ 21:01) (not performed) [Active]

## 2021-03-16 NOTE — H&P ADULT - ASSESSMENT
89 yo M with PMHx of HTN, HLD, Epilepsy ( on VPA and carbamazepine) , CVA with R sided deficits presented with acute change in mental status per son. Sun antedates that about two hours prior to arrival, the patient was perfectly fine and discussing stocks with his son. Suddenly became obtunded. The son did not notice any dysarthria, aphasia, or worsened weakness. On arrival pt was febrile to 103 F and hypoxic at 80% on room air and on NRB saturates only 91-92.  Called the house and spoke to the aide who said that the patient did not have any temperate at home and was doing wel until Monday when his po intake decreased. The patient has family visiting him all the time and they went to a dinner in [place] over the weekend as well.     Patient was found to be febrile and hypoxic on arrival. COVID swab came out positive. Blood work revealed slight anaemia, elevated lactate, troponin, a positive UA, therapeutic levels of AED. CT head and CTA head and neck did not show any acute stroke or occlusion.  Patient satted wel on 4L NC. Patient is being admitted for stroke w/u and COVID19 Hypoxia.        #COVID-19 Hypoxia/early phase:  # ARF secondary to COVID-19 PNA, SIRS present on admission  - Maintain on airborne and contact isolation.  - supplement with oxygen, oxygen saturation above 88%, c/w NC  - Obtain daily room air O2 saturations once O2 requirements stabilize.  - Fu inflammatory markers: ESR, CRP, LDH, Procalcitonin, D-Dimer, Ferritin, .  - Lovenox 40 mg SQ BID BMI > 30.   - Remdesivir 200mg first day, followed by 100mg daily for 4 days, - monitor LFTS  - Decadron 6mg qd for total of 10 days or until hospital discharge,   - monitor FS  - order 2 units of plasma, will get covid Igs and type and screen   - Incentive spirometry and proning 16h/day, as tolerated      #Positive UA/Lactate elevation  -patient AOX1, cant complain of  symptoms if any  -will start on rocpehin for now, f/u urine cltures  -f/u repeat lactate in am  -Will give gentle hydration, patient also has decreased po intake    #Elevated troponin  -EKG with PACs, f/u repeat troponin in am    #AMS-DOUBT stroke:  -neurology on board, CT head and CTA head and neck did not show any acute stroke or occlusion.  AEDs therapuetic  lipid profile, a1c  echo and MR head when stable from COVID-19 point of view  continue ASA Plavix and steroids    HTN:  C/w lisinopril and metoprolol    DVT PPX  FULL CODE  CHG   AIT

## 2021-03-16 NOTE — H&P ADULT - NSICDXPASTMEDICALHX_GEN_ALL_CORE_FT
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

## 2021-03-16 NOTE — CHART NOTE - NSCHARTNOTEFT_GEN_A_CORE
I made rounds today with the treatment team including the hospitalist, residents,  nurses, and discussed the patient's current medical status and discharge  planning needs, and reviewed the chart.    T(C): 37.2 (03-16-21 @ 06:32), Max: 40.3 (03-15-21 @ 21:40)  HR: 61 (03-16-21 @ 06:32) (61 - 85)  BP: 112/50 (03-16-21 @ 06:32) (111/56 - 147/63)  RR: 18 (03-16-21 @ 06:32) (18 - 20)  SpO2: 93% (03-16-21 @ 06:32) (92% - 99%)          I reached out to the patient's health care proxy/ responsible family member-           [     ]  I reached                                     and discussed the patient's medical condition,                   family concerns, and discharge planning           [     ]  I left a message with family               [  x   ]  I personally participated in rounds with the medical team and my resident and discussed the case. My resident reached                   family member/ HCP        Amanda avila)                        under my direction and supervision  and we reviewed the conversation.          [     ]  My resident left a message with family under my direction and supervision           [     ]   My resident attended medical rounds and called the family                [    x  ]    The following was discussed:  The patient's medical status over the past 24 hrs was reviewed, as well as oxygen needs and medication changes and labs. On 4 liters O2. Had temp 103. Weak, oriented x 1. On Recephin for UTI.         [   x   ]   The following concerns were raised: none. Had 24 hr HHA PTA. Walked with HC.           [     ] I spent 5-10 minutes on the above discussing medical issues with team members and family and/ or my resident    [ x    ] I spent 11-20 minutes on the above discussing medical issues with team members and family and/ or my resident    [     ] I spent 21-30 minutes on the above discussing medical issues with team members and family and/ or my resident

## 2021-03-16 NOTE — H&P ADULT - NSHPPHYSICALEXAM_GEN_ALL_CORE
CONSTITUTIONAL: non-toxic, well appearing gentleman  SKIN: no rash, no petechiae.  EYES: PERRL, EOMI, pink conjunctiva,   ENT: tongue midline, no exudates, MMM  NECK: Supple; no meningismus, no JVD  CARD: RRR, no murmurs, equal radial pulses bilaterally 2+  RESP: CTAB, no respiratory distress  ABD: Soft, non-tender, non-distended, no peritoneal signs, no HSM, no CVA tenderness  EXT: Normal ROM x4. No edema. No calves tenderness  NEURO: Alert, oriented x1

## 2021-03-16 NOTE — H&P ADULT - NSHPLABSRESULTS_GEN_ALL_CORE
.  LABS:                         11.8   2.90  )-----------( 144      ( 15 Mar 2021 21:37 )             34.7     03-15    137  |  98  |  30<H>  ----------------------------<  256<H>  4.6   |  22  |  1.5    Ca    8.9      15 Mar 2021 21:37    TPro  6.7  /  Alb  3.7  /  TBili  <0.2  /  DBili  x   /  AST  41  /  ALT  17  /  AlkPhos  68  03-15    PT/INR - ( 15 Mar 2021 21:37 )   PT: 18.90 sec;   INR: 1.64 ratio         PTT - ( 15 Mar 2021 21:37 )  PTT:37.1 sec  Urinalysis Basic - ( 15 Mar 2021 21:00 )    Color: Yellow / Appearance: Turbid / S.027 / pH: x  Gluc: x / Ketone: Small  / Bili: Negative / Urobili: <2 mg/dL   Blood: x / Protein: 100 mg/dL / Nitrite: Negative   Leuk Esterase: Large / RBC: 1 /HPF /  /HPF   Sq Epi: x / Non Sq Epi: 1 /HPF / Bacteria: Many        Lactate, Blood: 3.7 mmol/L (03-15 @ 21:38)      RADIOLOGY, EKG & ADDITIONAL TESTS: Reviewed.

## 2021-03-17 NOTE — PROGRESS NOTE ADULT - ASSESSMENT
89 yo M with PMHx of HTN, HLD, Epilepsy ( on VPA and carbamazepine) , CVA with R sided deficits presented with acute change in mental status per son. Sun antedates that about two hours prior to arrival, the patient was perfectly fine and discussing stocks with his son. Suddenly became obtunded. The son did not notice any dysarthria, aphasia, or worsened weakness. On arrival pt was febrile to 103 F and hypoxic at 80% on room air and on NRB saturates only 91-92.  Called the house and spoke to the aide who said that the patient did not have any temperate at home and was doing well until Monday when his po intake decreased. The patient has family visiting him all the time and they went to a dinner in [place] over the weekend as well.     Patient was found to be febrile and hypoxic on arrival. COVID swab came out positive. Blood work revealed slight anaemia, elevated lactate, troponin, a positive UA, therapeutic levels of AED. CT head and CTA head and neck did not show any acute stroke or occlusion.    #COVID-19 Hypoxia  # ARF secondary to COVID-19 PNA, SIRS present on admission  - Maintain on airborne and contact isolation.  - supplement with oxygen, oxygen saturation above 88%, c/w 4L NC, wean off as tolerated  - Fu inflammatory markers: ESR, CRP, LDH, Procalcitonin, D-Dimer 347, Ferritin, .  - AC per protocol: Lovenox 40 mg SQ BID BMI > 30.   - continue with Remdesivir 200mg first day, followed by 100mg daily for 4 days, - monitor LFTS  - continue with Decadron 6mg qd for total of 10 days or until hospital discharge,   - monitor FS  - Incentive spirometry and proning 16h/day, as tolerated      #Positive UA/Lactate elevation  -patient AOX1, cant complain of  symptoms if any  -c/w rocpehin for now, f/u urine cultures  -f/u repeat lactate in am  -Will give gentle hydration, patient also has decreased po intake    #Elevated troponin  -EKG with PACs, f/u repeat troponin in am    #AMS-DOUBT stroke:  -neurology on board, CT head and CTA head and neck did not show any acute stroke or occlusion.  AEDs therapuetic  lipid profile, a1c  echo and MR head when stable from COVID-19 point of view  continue ASA Plavix and steroids  ct head shows Absence of flow related contrast in the left cerebral hemisphere corresponding to a large area of encephalomalacia. neuro on board, Follow up recs    HTN:  C/w lisinopril and metoprolol    DVT PPX  FULL CODE  CHG   AIT    89 yo M with PMHx of HTN, HLD, Epilepsy ( on VPA and carbamazepine) , CVA with R sided deficits presented with acute change in mental status per son. Sun antedates that about two hours prior to arrival, the patient was perfectly fine and discussing stocks with his son. Suddenly became obtunded. The son did not notice any dysarthria, aphasia, or worsened weakness. On arrival pt was febrile to 103 F and hypoxic at 80% on room air and on NRB saturates only 91-92.  Called the house and spoke to the aide who said that the patient did not have any temperate at home and was doing well until Monday when his po intake decreased. The patient has family visiting him all the time and they went to a dinner in [place] over the weekend as well.     Patient was found to be febrile and hypoxic on arrival. COVID swab came out positive. Blood work revealed slight anaemia, elevated lactate, troponin, a positive UA, therapeutic levels of AED. CT head and CTA head and neck did not show any acute stroke or occlusion.    #COVID-19 Hypoxia  # ARF secondary to COVID-19 PNA, SIRS +on admission  - Maintain on airborne and contact isolation.  - supplement with oxygen, oxygen saturation above 88%, c/w 4L NC, wean off as tolerated  - inflammatory markers: ESR 66, CRP, LDH, Procalcitonin, D-Dimer 347, Ferritin, .  - AC per protocol: Lovenox 40 mg SQ BID as pt's BMI > 30.   - continue with Remdesivir 200mg first day, followed by 100mg daily for 4 days, - monitor LFTS  - continue with Decadron 6mg qd for total of 10 days or until hospital discharge,   - monitor FS    #Positive UA/Lactate elevation  -patient AOX1, cant complain of  symptoms if any  -c/w rocpehin for now, f/u urine cultures  -Will give gentle hydration, patient also has decreased po intake    #Elevated troponin  -EKG with PACs  - trops stable at 0.02, likely chronically elevated    #AMS-DOUBT stroke:  -neurology on board, CT head and CTA head and neck did not show any acute stroke or occlusion.  -echo and MR head when stable from COVID-19 point of view  -continue ASA Plavix and steroids  -ct head shows Absence of flow related contrast in the left cerebral hemisphere corresponding to a large area of encephalomalacia. neuro on board, Follow up recs    HTN:  -C/w lisinopril and metoprolol    DVT PPX: LVX  FULL CODE  CHG   AIT

## 2021-03-17 NOTE — CONSULT NOTE ADULT - SUBJECTIVE AND OBJECTIVE BOX
Vascular Neurology Consult Note:    DAVY CARROLL    1. Chief Complaint: Lethargy    HPI: This is an 89 yo M who presents with acute change in mental status. Shortness of breath was also reported. About two hours prior to arrival, the patient was discussing stocks with his son when he suddenly became obtunded. The son did not notice any dysarthria, aphasia, or worsened weakness (at baseline the patient has some right sided weakness and does not ambulate independently). EMS arrived and noted the patient to be febrile at 103 F and hypoxic at 80% on room air and on NRB saturates only 91-92%. No other constitutional symptoms are endorsed by the patient      2. Relevant PMH: Subdural hematoma, epilepsy  Prior ischemic stroke [x], Afib [ ], CAD [ ], HTN [x], DLD [x], DM [x], PVD [ ], Obesity [ ],   Sedentary lifestyle [ ], CHF [ ], ANDREW [ ], Cancer Hx [ ].    3. Social History: Smoking [ ], Drug Use [ ], Alcohol Use [ ], Other [ ] Baseline mRS ~ 3 (patient does not ambulate independently and requires assistance)    4. Possible Location of Stroke:     5. Relevant Brain Tissue Imaging:  < from: CT Brain Stroke Protocol (03.15.21 @ 21:00) >  Findings:    Redemonstrated extensive left hemispheric encephalomalacia with stable dilation of the left lateral ventricle.    Stable moderate parenchymal volume loss. Stable mild chronic microvascular ischemic changes involving the right cerebral hemisphere.    There is no acute territorial infarct, intracranial hemorrhage, extra-axial fluid collection or midline shift.    Stable post right frontal galen hole. No depressed skull fracture. The visualized paranasal sinuses and mastoids are well-aerated.    IMPRESSION:    No evidence of acute intracranial hemorrhage or acute territorial infarct. Stable exam since February 22, 2016.    < end of copied text >      6. Relevant Cerebrovascular Imaging: PENDING               7. Relevant blood tests:    `Lactate, Blood: 3.7    Complete Blood Count + Automated Diff (03.15.21 @ 21:37)   WBC Count: 2.90 K/uL   RBC Count: 3.58 M/uL   Hemoglobin: 11.8 g/dL   Hematocrit: 34.7 %   Mean Cell Volume: 96.9 fL   Mean Cell Hemoglobin: 33.0 pg   Mean Cell Hemoglobin Conc: 34.0 g/dL   Red Cell Distrib Width: 12.9 %   Platelet Count - Automated: 144 K/uL       8. Relevant cardiac rhythm monitoring: NSR    9. Relevant Cardiac Structure: (TTE/CHANTAL +/-):[ ]No intracardiac thrombus/[ ] no vegetation/[ ]no [place]/EF:    Home Medications:  atorvastatin 20 mg oral tablet: 1 tab(s) orally once a day (01 Aug 2018 00:45)  carBAMazepine 400 mg oral tablet, extended release:  (01 Aug 2018 00:45)  clopidogrel 75 mg oral tablet: 1 tab(s) orally once a day (01 Aug 2018 00:45)  divalproex sodium 500 mg oral tablet, extended release:  (01 Aug 2018 00:45)  Januvia 100 mg oral tablet:  (01 Aug 2018 00:45)  lisinopril 5 mg oral tablet: 1 tab(s) orally once a day (01 Aug 2018 00:45)      MEDICATIONS  (STANDING):      10. PT/OT/Speech/Rehab/S&Sw/ Cognitive eval results and recommendations:    11. Neurologic Examination:  Mentation: Obtunded (requires vigorous stimulation to arouse and then he promptly drifts off to sleep upon removal of the stimulus). Very briefly attempted to follow simple, one part command.  Language: No apparent aphasia (states "Davy" when asked name). Difficult to assess given how quickly he 'drifts off'.  Cranial Nerves:  	II – VF ?  	III/IV/VI – Pupils 4 mm. No gaze.  	V – Corneal present.  	VII – No facial palsy.  	VIII – No nystagmus.  	IX/X – Deferred.  	XI – Deferred.  	XII – Unable.  Motor: Occasional trace spontaneous movement. No posturing.  Sensory: Withdraws to noxious stimuli in all extremities equally.  Reflexes: Absent Babinski.  Cerebellum: Unable.    Vital Signs Last 24 Hrs  T(C): 40.3 (15 Mar 2021 21:40), Max: 40.3 (15 Mar 2021 21:40)  T(F): 104.5 (15 Mar 2021 21:40), Max: 104.5 (15 Mar 2021 21:40)  HR: 68 (15 Mar 2021 22:12) (65 - 85)  BP: 118/58 (15 Mar 2021 22:12) (118/58 - 147/63)  BP(mean): --  RR: 20 (15 Mar 2021 22:12) (20 - 20)  SpO2: 96% (15 Mar 2021 22:12) (92% - 99%)    12. NIH STROKE SCALE  Item	                                                        Score  1 a.	Level of Consciousness	               	2  1 b. LOC Questions	                                1  1 c.	LOC Commands	                               	2  2.	Best Gaze	                                        0  3.	Visual	                                                0  4.	Facial Palsy	                                        0  5 a.	Motor Arm - Left	                                0  5 b.	Motor Arm - Right	                        2  6 a.	Motor Leg - Left	                                0  6 b.	Motor Leg - Right	                                2  7.	Limb Ataxia	                                        0  8.	Sensory	                                                0  9.	Language	                                        0  10.	Dysarthria	                                        0  11.	Extinction and Inattention  	        0  ______________________________________  TOTAL	                                                        9 (most deficits are chronic)    Total NIHSS on admission:  9   NIHSS yesterday:      NIHSS today:     ******Please note that, save for the mentation, all deficits are reportedly chronic    mRS:  0 No symptoms at all  1 No significant disability despite symptoms; able to carry out all usual duties and activities without assistance  2 Slight disability; unable to carry out all previous activities, but able to look after own affairs  3 Moderate disability; requiring some help, but able to walk without assistance  4 Moderately severe disability; unable to walk without assistance and unable to attend to own bodily needs without assistance  5 Severe disability; bedridden, incontinent and requiring constant nursing care and attention  6 Dead      13. Impression:    Probable COVID-19 (leukopenia, hypoxia, fever + reported SOB)    Ddx includes, but is not limited to acute stroke, sepsis related encephalopathy, subclinical seizure    14. Probable cause/s of Stroke: Unclear      15. Suggestions:   Routine stroke workup including:    - STAT CTA head and neck  - Infectious work up, r/o COVID-19, check CXR, UA; work up as per ED  - 2D echocardiogram  - Lipid panel, A1c  - Brain MRI when stable and if no contraindication  - Continue ASA and Plavix  - Continue Lipitor  - f/u AED levels, continue VPA and Tegretol (please place NGT for Tegretol so no doses are missed, can give VPA as IV if needed)  - HOB 45 degrees for aspiration ppx  - PT/OT/SLP eval and tx      tPA not administered as there were no new focal neurologic deficits consistent with stroke appreciated on exam. Right hemiparesis old and new deficit is obtundation. More likely diagnosis is sepsis or COVID.       16. Disposition: Pending work up
  CARLOS ALBERTO CARROLL  89y, Male  Allergy: Ambien (Other)  Dilantin (Unknown)  phenobarbital (Unknown)      All historical available data reviewed.    HPI:   88 yo M with PMHx of HTN, HLD, Epilepsy ( on VPA and carbamazepine) , CVA with R sided deficits presented with acute change in mental status per son. Sun antedates that about two hours prior to arrival, the patient was perfectly fine and discussing stocks with his son. Suddenly became obtunded. The son did not notice any dysarthria, aphasia, or worsened weakness. On arrival pt was febrile to 103 F and hypoxic at 80% on room air and on NRB saturates only 91-92.  Called the house and spoke to the aide who said that the patient did not have any temperate at home and was doing wel until Monday when his po intake decreased. The patient has family visiting him all the time and they went to a dinner in [place] over the weekend as well.     Vital Signs Last 24 Hrs  T(C): 37.8 (16 Mar 2021 01:35), Max: 40.3 (15 Mar 2021 21:40)  T(F): 100 (16 Mar 2021 01:35), Max: 104.5 (15 Mar 2021 21:40)  HR: 70 (16 Mar 2021 01:35) (62 - 85)  BP: 111/56 (16 Mar 2021 01:35) (111/56 - 147/63)  RR: 20 (16 Mar 2021 01:35) (20 - 20)  SpO2: 95% (16 Mar 2021 01:35) (92% - 99%)    Patient was found to be febrile and hypoxic on arrival. COVID swab came out positive. Blood work revealed slight anaemia, elevated lactate, troponin, a positive UA, therapeutic levels of AED. CT head and CTA head and neck did not show any acute stroke or occlusion.  Patient satted wel on 4L NC. Patient is being admitted for stroke w/u and COVID19 Hypoxia.   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    Diabetes mellitus    HTN (hypertension)    Seizures    CVA (cerebral vascular accident)  x 4    Acute subdural hematoma  With surgical intervention    History of total hip replacement, right    H/O hernia repair          VITALS:  T(F): 97.1, Max: 99 (21 @ 21:45)  HR: 87  BP: 142/62  RR: 18Vital Signs Last 24 Hrs  T(C): 36.2 (17 Mar 2021 04:52), Max: 37.2 (16 Mar 2021 21:45)  T(F): 97.1 (17 Mar 2021 04:52), Max: 99 (16 Mar 2021 21:45)  HR: 87 (17 Mar 2021 04:52) (68 - 87)  BP: 142/62 (17 Mar 2021 04:52) (124/66 - 142/62)  BP(mean): --  RR: 18 (17 Mar 2021 04:52) (18 - 18)  SpO2: 90% (17 Mar 2021 04:52) (90% - 100%)    TESTS & MEASUREMENTS:                        8.8    8.53  )-----------( 102      ( 17 Mar 2021 07:28 )             26.6     03-    138  |  106  |  35<H>  ----------------------------<  218<H>  3.4<L>   |  20  |  1.1    Ca    7.7<L>      17 Mar 2021 07:28  Mg     1.9     -    TPro  5.1<L>  /  Alb  2.6<L>  /  TBili  <0.2  /  DBili  x   /  AST  39  /  ALT  15  /  AlkPhos  49  03-17    LIVER FUNCTIONS - ( 17 Mar 2021 07:28 )  Alb: 2.6 g/dL / Pro: 5.1 g/dL / ALK PHOS: 49 U/L / ALT: 15 U/L / AST: 39 U/L / GGT: x             Culture - Blood (collected 03-15-21 @ 21:38)  Source: .Blood Blood  Preliminary Report (21 @ 07:01):    No growth to date.    Culture - Blood (collected 03-15-21 @ 21:37)  Source: .Blood Blood  Preliminary Report (21 @ 07:01):    No growth to date.      Urinalysis Basic - ( 15 Mar 2021 21:00 )    Color: Yellow / Appearance: Turbid / S.027 / pH: x  Gluc: x / Ketone: Small  / Bili: Negative / Urobili: <2 mg/dL   Blood: x / Protein: 100 mg/dL / Nitrite: Negative   Leuk Esterase: Large / RBC: 1 /HPF /  /HPF   Sq Epi: x / Non Sq Epi: 1 /HPF / Bacteria: Many          RADIOLOGY & ADDITIONAL TESTS:  Personal review of radiological diagnostics performed  Echo and EKG results noted when applicable.     MEDICATIONS:  atorvastatin 20 milliGRAM(s) Oral at bedtime  carBAMazepine ER Tablet 400 milliGRAM(s) Oral daily  cefTRIAXone   IVPB 1000 milliGRAM(s) IV Intermittent every 24 hours  chlorhexidine 4% Liquid 1 Application(s) Topical <User Schedule>  clopidogrel Tablet 75 milliGRAM(s) Oral daily  dexAMETHasone  Injectable 6 milliGRAM(s) IV Push daily  diVALproex  milliGRAM(s) Oral daily  enoxaparin Injectable 40 milliGRAM(s) SubCutaneous daily  lisinopril 5 milliGRAM(s) Oral daily  metoprolol tartrate 50 milliGRAM(s) Oral two times a day  pantoprazole    Tablet 40 milliGRAM(s) Oral before breakfast  remdesivir  IVPB 100 milliGRAM(s) IV Intermittent every 24 hours  remdesivir  IVPB   IV Intermittent       ANTIBIOTICS:  cefTRIAXone   IVPB 1000 milliGRAM(s) IV Intermittent every 24 hours  remdesivir  IVPB 100 milliGRAM(s) IV Intermittent every 24 hours  remdesivir  IVPB   IV Intermittent

## 2021-03-17 NOTE — PROGRESS NOTE ADULT - SUBJECTIVE AND OBJECTIVE BOX
CARLOS ALBERTO CARROLL  89y  Male      Patient is a 89y old  Male who presents with a chief complaint of  altered mental status       INTERVAL HPI/OVERNIGHT EVENTS: The patient was seen at bedside.  Resting in bed. No apparent distress,.       ******************************* REVIEW OF SYSTEMS:**********************************************    All other review of systems negative    *********************** VITALS ******************************************    T(F): 97.3 (21 @ 14:00)  HR: 89 (21 @ 14:00) (68 - 89)  BP: 131/66 (21 @ 14:00) (124/66 - 142/62)  RR: 18 (21 @ 14:00) (18 - 18)  SpO2: 98% (21 @ 14:00) (90% - 98%)    21 @ 07:  -  21 @ 07:00  --------------------------------------------------------  IN: 736 mL / OUT: 700 mL / NET: 36 mL            21 @ 07:  -  21 @ 07:00  --------------------------------------------------------  IN: 736 mL / OUT: 700 mL / NET: 36 mL        ******************************** PHYSICAL EXAM:**************************************************  GENERAL: NAD    PSYCH: no agitation,   HEENT:     NERVOUS SYSTEM:  Alert , awake x 0-1    PULMONARY: VA, CTA    CARDIOVASCULAR: S1S2 RRR    GI: Soft, NT, ND; BS present.    EXTREMITIES:  2+ Peripheral Pulses, No clubbing, cyanosis, or edema    LYMPH: No lymphadenopathy noted    SKIN: No rashes or lesions      **************************** LABS *******************************************************                          8.8    8.53  )-----------( 102      ( 17 Mar 2021 07:28 )             26.6     03-17    138  |  106  |  35<H>  ----------------------------<  218<H>  3.4<L>   |  20  |  1.1    Ca    7.7<L>      17 Mar 2021 07:28  Mg     1.9     -    TPro  5.1<L>  /  Alb  2.6<L>  /  TBili  <0.2  /  DBili  x   /  AST  39  /  ALT  15  /  AlkPhos  49  03-17      Urinalysis Basic - ( 15 Mar 2021 21:00 )    Color: Yellow / Appearance: Turbid / S.027 / pH: x  Gluc: x / Ketone: Small  / Bili: Negative / Urobili: <2 mg/dL   Blood: x / Protein: 100 mg/dL / Nitrite: Negative   Leuk Esterase: Large / RBC: 1 /HPF /  /HPF   Sq Epi: x / Non Sq Epi: 1 /HPF / Bacteria: Many      PT/INR - ( 15 Mar 2021 21:37 )   PT: 18.90 sec;   INR: 1.64 ratio         PTT - ( 15 Mar 2021 21:37 )  PTT:37.1 sec  Lactate Trend  - @ 07:28 Lactate:1.7   -15 @ 21:38 Lactate:3.7     CARDIAC MARKERS ( 17 Mar 2021 07:28 )  x     / 0.02 ng/mL / x     / x     / x      CARDIAC MARKERS ( 15 Mar 2021 21:37 )  x     / 0.02 ng/mL / x     / x     / x          CAPILLARY BLOOD GLUCOSE  246 (15 Mar 2021 20:48)      POCT Blood Glucose.: 359 mg/dL (17 Mar 2021 16:24)          **************************Active Medications *******************************************  Ambien (Other)  Dilantin (Unknown)  phenobarbital (Unknown)      atorvastatin 20 milliGRAM(s) Oral at bedtime  carBAMazepine ER Tablet 400 milliGRAM(s) Oral daily  cefTRIAXone   IVPB 1000 milliGRAM(s) IV Intermittent every 24 hours  chlorhexidine 4% Liquid 1 Application(s) Topical <User Schedule>  clopidogrel Tablet 75 milliGRAM(s) Oral daily  dexAMETHasone  Injectable 6 milliGRAM(s) IV Push daily  diVALproex  milliGRAM(s) Oral daily  enoxaparin Injectable 40 milliGRAM(s) SubCutaneous daily  lisinopril 5 milliGRAM(s) Oral daily  metoprolol tartrate 50 milliGRAM(s) Oral two times a day  pantoprazole    Tablet 40 milliGRAM(s) Oral before breakfast  remdesivir  IVPB 100 milliGRAM(s) IV Intermittent every 24 hours  remdesivir  IVPB   IV Intermittent       ***************************************************  RADIOLOGY & ADDITIONAL TESTS:    Imaging Personally Reviewed:  [ ] YES  [ ] NO    HEALTH ISSUES - PROBLEM Dx:

## 2021-03-17 NOTE — PROGRESS NOTE ADULT - SUBJECTIVE AND OBJECTIVE BOX
SUBJECTIVE:    Patient is a 89y old Male who presents with a chief complaint of sob (16 Mar 2021 08:45)    Currently admitted to medicine with the primary diagnosis of AMS (altered mental status)       Today is hospital day 1d. This morning he is resting comfortably in bed and reports no new issues or overnight events.     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    Diabetes mellitus    HTN (hypertension)    Seizures    CVA (cerebral vascular accident)  x 4    Acute subdural hematoma  With surgical intervention    History of total hip replacement, right    H/O hernia repair        ALLERGIES:  Ambien (Other)  Dilantin (Unknown)  phenobarbital (Unknown)    MEDICATIONS:  STANDING MEDICATIONS  atorvastatin 20 milliGRAM(s) Oral at bedtime  carBAMazepine ER Tablet 400 milliGRAM(s) Oral daily  cefTRIAXone   IVPB 1000 milliGRAM(s) IV Intermittent every 24 hours  chlorhexidine 4% Liquid 1 Application(s) Topical <User Schedule>  clopidogrel Tablet 75 milliGRAM(s) Oral daily  dexAMETHasone  Injectable 6 milliGRAM(s) IV Push daily  diVALproex  milliGRAM(s) Oral daily  enoxaparin Injectable 40 milliGRAM(s) SubCutaneous daily  lisinopril 5 milliGRAM(s) Oral daily  metoprolol tartrate 50 milliGRAM(s) Oral two times a day  remdesivir  IVPB   IV Intermittent   remdesivir  IVPB 100 milliGRAM(s) IV Intermittent every 24 hours    PRN MEDICATIONS    VITALS:   T(F): 97.1  HR: 87  BP: 142/62  RR: 18  SpO2: 90%    LABS:                        11.8   2.90  )-----------( 144      ( 15 Mar 2021 21:37 )             34.7     03-15    137  |  98  |  30<H>  ----------------------------<  256<H>  4.6   |  22  |  1.5    Ca    8.9      15 Mar 2021 21:37    TPro  6.7  /  Alb  3.7  /  TBili  <0.2  /  DBili  x   /  AST  41  /  ALT  17  /  AlkPhos  68  03-15    PT/INR - ( 15 Mar 2021 21:37 )   PT: 18.90 sec;   INR: 1.64 ratio         PTT - ( 15 Mar 2021 21:37 )  PTT:37.1 sec  Urinalysis Basic - ( 15 Mar 2021 21:00 )    Color: Yellow / Appearance: Turbid / S.027 / pH: x  Gluc: x / Ketone: Small  / Bili: Negative / Urobili: <2 mg/dL   Blood: x / Protein: 100 mg/dL / Nitrite: Negative   Leuk Esterase: Large / RBC: 1 /HPF /  /HPF   Sq Epi: x / Non Sq Epi: 1 /HPF / Bacteria: Many            Culture - Blood (collected 15 Mar 2021 21:38)  Source: .Blood Blood  Preliminary Report (17 Mar 2021 07:01):    No growth to date.    Culture - Blood (collected 15 Mar 2021 21:37)  Source: .Blood Blood  Preliminary Report (17 Mar 2021 07:01):    No growth to date.      CARDIAC MARKERS ( 15 Mar 2021 21:37 )  x     / 0.02 ng/mL / x     / x     / x          RADIOLOGY:    PHYSICAL EXAM:  GEN: obtunded  PULM/CHEST: decreased breath sound bilateral  CVS: Regular rate and rhythm, S1-S2, no murmurs  ABD: Soft, non-tender, non-distended, +BS  EXT: No edema  NEURO: AAOx3    Stevens Catheter:   Indwelling Urethral Catheter:     Connect To:  Straight Drainage/Gravity    Indication:  Improved Comfort Care (03-15-21 @ 21:01) (not performed) [Active]       SUBJECTIVE:    Patient is a 89y old Male who presents with a chief complaint of sob (16 Mar 2021 08:45)    Currently admitted to medicine with the primary diagnosis of AMS (altered mental status)       Today is hospital day 1d. This morning he is resting comfortably in bed and reports no new issues or overnight events.     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    Diabetes mellitus    HTN (hypertension)    Seizures    CVA (cerebral vascular accident)  x 4    Acute subdural hematoma  With surgical intervention    History of total hip replacement, right    H/O hernia repair        ALLERGIES:  Ambien (Other)  Dilantin (Unknown)  phenobarbital (Unknown)    MEDICATIONS:  STANDING MEDICATIONS  atorvastatin 20 milliGRAM(s) Oral at bedtime  carBAMazepine ER Tablet 400 milliGRAM(s) Oral daily  cefTRIAXone   IVPB 1000 milliGRAM(s) IV Intermittent every 24 hours  chlorhexidine 4% Liquid 1 Application(s) Topical <User Schedule>  clopidogrel Tablet 75 milliGRAM(s) Oral daily  dexAMETHasone  Injectable 6 milliGRAM(s) IV Push daily  diVALproex  milliGRAM(s) Oral daily  enoxaparin Injectable 40 milliGRAM(s) SubCutaneous daily  lisinopril 5 milliGRAM(s) Oral daily  metoprolol tartrate 50 milliGRAM(s) Oral two times a day  remdesivir  IVPB   IV Intermittent   remdesivir  IVPB 100 milliGRAM(s) IV Intermittent every 24 hours    PRN MEDICATIONS    VITALS:   T(F): 97.1  HR: 87  BP: 142/62  RR: 18  SpO2: 90%    LABS:                        11.8   2.90  )-----------( 144      ( 15 Mar 2021 21:37 )             34.7     03-15    137  |  98  |  30<H>  ----------------------------<  256<H>  4.6   |  22  |  1.5    Ca    8.9      15 Mar 2021 21:37    TPro  6.7  /  Alb  3.7  /  TBili  <0.2  /  DBili  x   /  AST  41  /  ALT  17  /  AlkPhos  68  03-15    PT/INR - ( 15 Mar 2021 21:37 )   PT: 18.90 sec;   INR: 1.64 ratio         PTT - ( 15 Mar 2021 21:37 )  PTT:37.1 sec  Urinalysis Basic - ( 15 Mar 2021 21:00 )    Color: Yellow / Appearance: Turbid / S.027 / pH: x  Gluc: x / Ketone: Small  / Bili: Negative / Urobili: <2 mg/dL   Blood: x / Protein: 100 mg/dL / Nitrite: Negative   Leuk Esterase: Large / RBC: 1 /HPF /  /HPF   Sq Epi: x / Non Sq Epi: 1 /HPF / Bacteria: Many            Culture - Blood (collected 15 Mar 2021 21:38)  Source: .Blood Blood  Preliminary Report (17 Mar 2021 07:01):    No growth to date.    Culture - Blood (collected 15 Mar 2021 21:37)  Source: .Blood Blood  Preliminary Report (17 Mar 2021 07:01):    No growth to date.      CARDIAC MARKERS ( 15 Mar 2021 21:37 )  x     / 0.02 ng/mL / x     / x     / x          RADIOLOGY:    PHYSICAL EXAM:  GEN: NAD  PULM/CHEST: decreased breath sound bilateral  CVS: Regular rate and rhythm, S1-S2, no murmurs  ABD: Soft, non-tender, abdomen distended, +BS  EXT: No edema  NEURO: AAOx2    Stevens Catheter:   Indwelling Urethral Catheter:     Connect To:  Straight Drainage/Gravity    Indication:  Improved Comfort Care (03-15-21 @ 21:01) (not performed) [Active]

## 2021-03-17 NOTE — CONSULT NOTE ADULT - COMMENTS
unable to obtain history secondary to patient's mental status and/or sedation  alert, does try to respond but ROS unreliable

## 2021-03-17 NOTE — PROGRESS NOTE ADULT - ASSESSMENT
89 yo M with PMHx of HTN, HLD, Epilepsy ( on VPA and carbamazepine) , CVA with R sided deficits presented with acute change in mental status per son. Sun antedates that about two hours prior to arrival, the patient was perfectly fine and discussing stocks with his son. Suddenly became obtunded. The son did not notice any dysarthria, aphasia, or worsened weakness. On arrival pt was febrile to 103 F and hypoxic at 80% on room air and on NRB saturates only 91-92.  Called the house and spoke to the aide who said that the patient did not have any temperate at home and was doing well until Monday when his po intake decreased. The patient has family visiting him all the time and they went to a dinner in [place] over the weekend as well.     Patient was found to be febrile and hypoxic on arrival. COVID swab came out positive. Blood work revealed slight anaemia, elevated lactate, troponin, a positive UA, therapeutic levels of AED. CT head and CTA head and neck did not show any acute stroke or occlusion.    # Acute hypoxic respiratory failure due to COVID PNA  - Maintain on airborne and contact isolation.  - Currently on 4L NC Spo2 94-98% , Please titrate down as tolerated.   - AC per protocol: Lovenox 40 mg SQ BID as pt's BMI > 30.   - continue with Remdesivir 200mg first day, followed by 100mg daily for 4 days, - monitor LFTS  - continue with Decadron 6mg qd for total of 10 days or until hospital discharge,   - monitor FS    #Possible Acute pyelonephritis   -c/w rocpehin for now, f/u urine cultures  -Will give gentle hydration, patient also has decreased po intake    #Elevated troponin  -EKG with PACs  - trops stable at 0.02, likely chronically elevated    #Altered Mental status >> likely due to metabolic encephalopathy , Doubt  stroke:  -neurology on board, CT head and CTA head and neck did not show any acute stroke or occlusion.  -echo and MR head when stable from COVID-19 point of view  -continue ASA Plavix and steroids  -ct head shows Absence of flow related contrast in the left cerebral hemisphere corresponding to a large area of encephalomalacia.    neuro on board, Follow up recs    # Hx of CVA with right hemiparesis/ Seizure d/o      On AED, ASA, Statin .        HTN:  -C/w lisinopril and metoprolol    DVT PPX: LVX  FULL CODE  CHG     #Progress Note Handoff  Pending (specify):  Clinical improvement  Family discussion: As per communication team.  Disposition: Unknown at this time.

## 2021-03-17 NOTE — CHART NOTE - NSCHARTNOTEFT_GEN_A_CORE
I made rounds today with the treatment team including the hospitalist, residents,  nurses, and discussed the patient's current medical status and discharge  planning needs, and reviewed the chart.    T(C): 36.2 (03-17-21 @ 04:52), Max: 37.2 (03-16-21 @ 21:45)  HR: 87 (03-17-21 @ 04:52) (68 - 87)  BP: 142/62 (03-17-21 @ 04:52) (124/66 - 142/62)  RR: 18 (03-17-21 @ 04:52) (18 - 18)  SpO2: 90% (03-17-21 @ 04:52) (90% - 96%)          I reached out to the patient's health care proxy/ responsible family member-           [     ]  I reached                                     and discussed the patient's medical condition,                   family concerns, and discharge planning           [     ]  I left a message with family               [   x  ]  I personally participated in rounds with the medical team and my resident and discussed the case. My resident reached                   family member/ HCP     Amanda Espitia                           under my direction and supervision  and we reviewed the conversation.          [     ]  My resident left a message with family under my direction and supervision           [     ]   My resident attended medical rounds and called the family                [   x   ]    The following was discussed:  The patient's medical status over the past 24 hrs was reviewed, as well as oxygen needs and medication changes and labs. O2 unchanged - on 4 liters. More alert. On Rocephin for UTI.          [   x   ]   The following concerns were raised: none          [     ] I spent 5-10 minutes on the above discussing medical issues with team members and family and/ or my resident    [  x   ] I spent 11-20 minutes on the above discussing medical issues with team members and family and/ or my resident    [     ] I spent 21-30 minutes on the above discussing medical issues with team members and family and/ or my resident

## 2021-03-17 NOTE — DISCHARGE NOTE NURSING/CASE MANAGEMENT/SOCIAL WORK - PATIENT PORTAL LINK FT
You can access the FollowMyHealth Patient Portal offered by [facility] by registering at the following website: [email or web address]. By joining [facility]’s FollowMyHealth portal, you will also be able to view your health information using other applications (apps) compatible with our system.

## 2021-03-18 NOTE — CHART NOTE - NSCHARTNOTEFT_GEN_A_CORE
I made rounds today with the treatment team including the hospitalist, residents,  nurses, and discussed the patient's current medical status and discharge  planning needs, and reviewed the chart.    T(C): 37.8 (03-18-21 @ 06:45), Max: 39.5 (03-18-21 @ 04:00)  HR: 85 (03-18-21 @ 05:00) (85 - 89)  BP: 160/81 (03-18-21 @ 05:00) (131/66 - 160/81)  RR: 18 (03-18-21 @ 05:00) (18 - 18)  SpO2: 92% (03-18-21 @ 05:00) (92% - 98%)          I reached out to the patient's health care proxy/ responsible family member-           [     ]  I reached                                     and discussed the patient's medical condition,                   family concerns, and discharge planning           [     ]  I left a message with family               [  x   ]  I personally participated in rounds with the medical team and my resident and discussed the case. My resident reached                   family member/ HCP     Amanda Espitia                           under my direction and supervision  and we reviewed the conversation.          [     ]  My resident left a message with family under my direction and supervision           [     ]   My resident attended medical rounds and called the family                [   x   ]    The following was discussed:  The patient's medical status over the past 24 hrs was reviewed, as well as oxygen needs and medication changes and labs. On Rocephin for UTI. Had a temp of 102 -103. NC O2 reduced from 4 liters to RA. Insulin was increased. Plan to f/u imflammatory markers.          [  x    ]   The following concerns were raised: none          [     ] I spent 5-10 minutes on the above discussing medical issues with team members and family and/ or my resident    [  x   ] I spent 11-20 minutes on the above discussing medical issues with team members and family and/ or my resident    [     ] I spent 21-30 minutes on the above discussing medical issues with team members and family and/ or my resident

## 2021-03-18 NOTE — PROGRESS NOTE ADULT - SUBJECTIVE AND OBJECTIVE BOX
CARLOS ALBERTO CARROLL  89y, Male    All available historical data reviewed    OVERNIGHT EVENTS:  fevers  92% RA  does not follow commands    ROS:  unable to obtain history secondary to patient's mental status and/or sedation     VITALS:  T(F): 100.1, Max: 103.1 (03-18-21 @ 04:00)  HR: 85  BP: 160/81  RR: 18Vital Signs Last 24 Hrs  T(C): 37.8 (18 Mar 2021 06:45), Max: 39.5 (18 Mar 2021 04:00)  T(F): 100.1 (18 Mar 2021 06:45), Max: 103.1 (18 Mar 2021 04:00)  HR: 85 (18 Mar 2021 05:00) (85 - 89)  BP: 160/81 (18 Mar 2021 05:00) (131/66 - 160/81)  BP(mean): --  RR: 18 (18 Mar 2021 05:00) (18 - 18)  SpO2: 92% (18 Mar 2021 05:00) (92% - 98%)    TESTS & MEASUREMENTS:                        8.9    6.60  )-----------( 107      ( 18 Mar 2021 06:23 )             27.2     03-18    140  |  108  |  34<H>  ----------------------------<  257<H>  4.1   |  19  |  1.0    Ca    7.5<L>      18 Mar 2021 06:23  Mg     1.9     03-18    TPro  4.9<L>  /  Alb  2.6<L>  /  TBili  <0.2  /  DBili  x   /  AST  43<H>  /  ALT  17  /  AlkPhos  52  03-18    LIVER FUNCTIONS - ( 18 Mar 2021 06:23 )  Alb: 2.6 g/dL / Pro: 4.9 g/dL / ALK PHOS: 52 U/L / ALT: 17 U/L / AST: 43 U/L / GGT: x             Culture - Blood (collected 03-15-21 @ 21:38)  Source: .Blood Blood  Preliminary Report (03-17-21 @ 07:01):    No growth to date.    Culture - Blood (collected 03-15-21 @ 21:37)  Source: .Blood Blood  Preliminary Report (03-17-21 @ 07:01):    No growth to date.    Culture - Urine (collected 03-15-21 @ 21:02)  Source: .Urine Catheterized  Preliminary Report (03-17-21 @ 23:12):    >100,000 CFU/ml Escherichia coli            RADIOLOGY & ADDITIONAL TESTS:  Personal review of radiological diagnostics performed  Echo and EKG results noted when applicable.     MEDICATIONS:  acetaminophen   Tablet .. 650 milliGRAM(s) Oral every 6 hours PRN  atorvastatin 20 milliGRAM(s) Oral at bedtime  carBAMazepine ER Tablet 400 milliGRAM(s) Oral daily  cefTRIAXone   IVPB 1000 milliGRAM(s) IV Intermittent every 24 hours  chlorhexidine 4% Liquid 1 Application(s) Topical <User Schedule>  clopidogrel Tablet 75 milliGRAM(s) Oral daily  dexAMETHasone  Injectable 6 milliGRAM(s) IV Push daily  dextrose 40% Gel 15 Gram(s) Oral once  dextrose 5%. 1000 milliLiter(s) IV Continuous <Continuous>  dextrose 5%. 1000 milliLiter(s) IV Continuous <Continuous>  dextrose 50% Injectable 25 Gram(s) IV Push once  dextrose 50% Injectable 12.5 Gram(s) IV Push once  dextrose 50% Injectable 25 Gram(s) IV Push once  diVALproex  milliGRAM(s) Oral daily  enoxaparin Injectable 40 milliGRAM(s) SubCutaneous daily  glucagon  Injectable 1 milliGRAM(s) IntraMuscular once  insulin glargine Injectable (LANTUS) 15 Unit(s) SubCutaneous at bedtime  insulin lispro (ADMELOG) corrective regimen sliding scale   SubCutaneous three times a day before meals  insulin lispro Injectable (ADMELOG) 5 Unit(s) SubCutaneous three times a day before meals  lisinopril 5 milliGRAM(s) Oral daily  metoprolol tartrate 50 milliGRAM(s) Oral two times a day  pantoprazole    Tablet 40 milliGRAM(s) Oral before breakfast  remdesivir  IVPB 100 milliGRAM(s) IV Intermittent every 24 hours  remdesivir  IVPB   IV Intermittent       ANTIBIOTICS:  cefTRIAXone   IVPB 1000 milliGRAM(s) IV Intermittent every 24 hours  remdesivir  IVPB 100 milliGRAM(s) IV Intermittent every 24 hours  remdesivir  IVPB   IV Intermittent

## 2021-03-18 NOTE — PROGRESS NOTE ADULT - SUBJECTIVE AND OBJECTIVE BOX
CARLY CARLOS ALBERTO  89y  Male      Patient is a 89y old  Male who presents with a chief complaint of sob.      INTERVAL HPI/OVERNIGHT EVENTS: The patient was seen at bedside.   Resting comfortably. No distress.       ******************************* REVIEW OF SYSTEMS:**********************************************  All other review of systems negative    *********************** VITALS ******************************************    T(F): 97.3 (03-18-21 @ 13:46)  HR: 68 (03-18-21 @ 13:46) (68 - 89)  BP: 133/58 (03-18-21 @ 13:46) (133/58 - 160/81)  RR: 18 (03-18-21 @ 13:46) (18 - 18)  SpO2: 92% (03-18-21 @ 13:46) (92% - 94%)    03-17-21 @ 07:01  -  03-18-21 @ 07:00  --------------------------------------------------------  IN: 0 mL / OUT: 200 mL / NET: -200 mL    03-18-21 @ 07:01  -  03-18-21 @ 15:31  --------------------------------------------------------  IN: 0 mL / OUT: 400 mL / NET: -400 mL            03-17-21 @ 07:01  -  03-18-21 @ 07:00  --------------------------------------------------------  IN: 0 mL / OUT: 200 mL / NET: -200 mL    03-18-21 @ 07:01  -  03-18-21 @ 15:31  --------------------------------------------------------  IN: 0 mL / OUT: 400 mL / NET: -400 mL        ******************************** PHYSICAL EXAM:**************************************************  GENERAL: NAD    PSYCH: no agitation, baseline mentation  HEENT:     NERVOUS SYSTEM:  Alert & Oriented X3, MS  5/5 B/L  UE and LE ; Sensory intact    PULMONARY: VA, CTA    CARDIOVASCULAR: S1S2 RRR    GI: Soft, NT, ND; BS present.    EXTREMITIES:  2+ Peripheral Pulses, No clubbing, cyanosis, or edema    LYMPH: No lymphadenopathy noted    SKIN: No rashes or lesions      **************************** LABS *******************************************************                          8.9    6.60  )-----------( 107      ( 18 Mar 2021 06:23 )             27.2     03-18    140  |  108  |  34<H>  ----------------------------<  257<H>  4.1   |  19  |  1.0    Ca    7.5<L>      18 Mar 2021 06:23  Mg     1.9     03-18    TPro  4.9<L>  /  Alb  2.6<L>  /  TBili  <0.2  /  DBili  x   /  AST  43<H>  /  ALT  17  /  AlkPhos  52  03-18          Lactate Trend  03-17 @ 07:28 Lactate:1.7   03-15 @ 21:38 Lactate:3.7     CARDIAC MARKERS ( 17 Mar 2021 07:28 )  x     / 0.02 ng/mL / x     / x     / x          CAPILLARY BLOOD GLUCOSE      POCT Blood Glucose.: 376 mg/dL (18 Mar 2021 11:02)          **************************Active Medications *******************************************  Ambien (Other)  Dilantin (Unknown)  phenobarbital (Unknown)      acetaminophen   Tablet .. 650 milliGRAM(s) Oral every 6 hours PRN  atorvastatin 20 milliGRAM(s) Oral at bedtime  carBAMazepine ER Tablet 400 milliGRAM(s) Oral daily  cefTRIAXone   IVPB 1000 milliGRAM(s) IV Intermittent every 24 hours  chlorhexidine 4% Liquid 1 Application(s) Topical <User Schedule>  clopidogrel Tablet 75 milliGRAM(s) Oral daily  dexAMETHasone  Injectable 6 milliGRAM(s) IV Push daily  dextrose 40% Gel 15 Gram(s) Oral once  dextrose 5%. 1000 milliLiter(s) IV Continuous <Continuous>  dextrose 5%. 1000 milliLiter(s) IV Continuous <Continuous>  dextrose 50% Injectable 25 Gram(s) IV Push once  dextrose 50% Injectable 12.5 Gram(s) IV Push once  dextrose 50% Injectable 25 Gram(s) IV Push once  diVALproex  milliGRAM(s) Oral daily  enoxaparin Injectable 40 milliGRAM(s) SubCutaneous daily  glucagon  Injectable 1 milliGRAM(s) IntraMuscular once  insulin glargine Injectable (LANTUS) 20 Unit(s) SubCutaneous at bedtime  insulin lispro (ADMELOG) corrective regimen sliding scale   SubCutaneous three times a day before meals  insulin lispro Injectable (ADMELOG) 9 Unit(s) SubCutaneous three times a day before meals  lisinopril 5 milliGRAM(s) Oral daily  metoprolol tartrate 50 milliGRAM(s) Oral two times a day  pantoprazole    Tablet 40 milliGRAM(s) Oral before breakfast  remdesivir  IVPB 100 milliGRAM(s) IV Intermittent every 24 hours  remdesivir  IVPB   IV Intermittent       ***************************************************  RADIOLOGY & ADDITIONAL TESTS:    Imaging Personally Reviewed:  [ ] YES  [ ] NO    HEALTH ISSUES - PROBLEM Dx:

## 2021-03-18 NOTE — PROGRESS NOTE ADULT - ASSESSMENT
89 yo M with PMHx of HTN, HLD, Epilepsy ( on VPA and carbamazepine) , CVA with R sided deficits presented with acute change in mental status per son. Sun antedates that about two hours prior to arrival, the patient was perfectly fine and discussing stocks with his son. Suddenly became obtunded. The son did not notice any dysarthria, aphasia, or worsened weakness. On arrival pt was febrile to 103 F and hypoxic at 80% on room air and on NRB saturates only 91-92.  Called the house and spoke to the aide who said that the patient did not have any temperate at home and was doing well until Monday when his po intake decreased. The patient has family visiting him all the time and they went to a dinner in [place] over the weekend as well.     Patient was found to be febrile and hypoxic on arrival. COVID swab came out positive. Blood work revealed slight anaemia, elevated lactate, troponin, a positive UA, therapeutic levels of AED. CT head and CTA head and neck did not show any acute stroke or occlusion.    # Acute hypoxic respiratory failure due to COVID PNA  - Maintain on airborne and contact isolation.  - Improved on 4L , titrated down to RA today.   - AC per protocol: Lovenox 40 mg SQ BID as pt's BMI > 30.   - continue with Remdesivir 200mg first day, followed by 100mg daily for 4 days, - monitor LFTS  - continue with Decadron 6mg qd for total of 10 days or until hospital discharge,   - monitor FS    # Acute pyelonephritis due to Ecoli   -c/w Ceftriaxone ( sensitive)    #Elevated troponin  -EKG with PACs  - trops stable at 0.02, likely chronically elevated    #Altered Mental status >> likely due to metabolic encephalopathy , Doubt  stroke:   Improving.   -neurology on board, CT head and CTA head and neck did not show any acute stroke or occlusion.  -echo and MR head when stable from COVID-19 point of view  -continue ASA Plavix and steroids  -ct head shows Absence of flow related contrast in the left cerebral hemisphere corresponding to a large area of encephalomalacia.     # Hx of CVA with right hemiparesis/ Seizure d/o      On AED, ASA, Statin .        HTN:  -C/w lisinopril and metoprolol    DVT PPX: LVX  FULL CODE  CHG     #Progress Note Handoff  Pending (specify):  Clinical improvement/ Stability  Family discussion: As per communication team.  Disposition: Home with A 24/7

## 2021-03-18 NOTE — PROGRESS NOTE ADULT - ASSESSMENT
89 yo M with PMHx of HTN, HLD, Epilepsy ( on VPA and carbamazepine) , CVA with R sided deficits presented with acute change in mental status per son. Son states that about two hours prior to arrival, the patient was perfectly fine and discussing stocks with his son. Suddenly became obtunded. The son did not notice any dysarthria, aphasia, or worsened weakness. On arrival pt was febrile to 103 F and hypoxic at 80% on room air and on NRB saturates only 91-92.  Called the house and spoke to the aide who said that the patient did not have any temperate at home and was doing well until Monday when his po intake decreased. The patient has family visiting him all the time and they went to a dinner in [place] over the weekend as well.     IMPRESSION;  No evidence of a CNS infection. Presently alert, does respond with no meningeal signs  Acute change in mentation probably related to sepsis secondary to acute pyelo with metabolic encephalopathy   BCx 3/15 NG  UCx E coli  COVID 19 with moderate illness.   Timeline of infection unclear  Ferritin 750    Procal 1.78  CXR with no significant opacities    RECOMMENDATIONS;  Target SpO2 92 % to 96 %.rolf  Dexamethasone 6 mg iv q24h for 10 days.  Monitor for side effects: hyperglycemia, neurological ( agitation/confusion), adrenal suppression, bacterial and fungal infections  Rocephin 1 gm ivq24h and adjust based on E coli sensitivities  I shall be away from 3/19-3/29 and will be covered by Dr Carranza [id number] and Dr Nelson [id number] for that interval.

## 2021-03-18 NOTE — PROGRESS NOTE ADULT - SUBJECTIVE AND OBJECTIVE BOX
CARLOS ALBERTO CARROLL 89y Male  MRN#: [id number]   CODE STATUS: FULL    SUBJECTIVE  Patient is a 89y/o man with PMH of HTN, HLD, epilepsy, CVA (R-sided deficits) who presents with altered mental status. Course complicated by COVID+, UTI.    Interval Events  Patient seen at bedside, no events overnight.    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    Diabetes mellitus    HTN (hypertension)    Seizures    CVA (cerebral vascular accident)  x 4    Acute subdural hematoma  With surgical intervention    History of total hip replacement, right    H/O hernia repair      ALLERGIES:  Ambien (Other)  Dilantin (Unknown)  phenobarbital (Unknown)    MEDICATIONS:  STANDING MEDICATIONS  atorvastatin 20 milliGRAM(s) Oral at bedtime  carBAMazepine ER Tablet 400 milliGRAM(s) Oral daily  cefTRIAXone   IVPB 1000 milliGRAM(s) IV Intermittent every 24 hours  chlorhexidine 4% Liquid 1 Application(s) Topical <User Schedule>  clopidogrel Tablet 75 milliGRAM(s) Oral daily  dexAMETHasone  Injectable 6 milliGRAM(s) IV Push daily  dextrose 40% Gel 15 Gram(s) Oral once  dextrose 5%. 1000 milliLiter(s) IV Continuous <Continuous>  dextrose 5%. 1000 milliLiter(s) IV Continuous <Continuous>  dextrose 50% Injectable 25 Gram(s) IV Push once  dextrose 50% Injectable 12.5 Gram(s) IV Push once  dextrose 50% Injectable 25 Gram(s) IV Push once  diVALproex  milliGRAM(s) Oral daily  enoxaparin Injectable 40 milliGRAM(s) SubCutaneous daily  glucagon  Injectable 1 milliGRAM(s) IntraMuscular once  insulin glargine Injectable (LANTUS) 20 Unit(s) SubCutaneous at bedtime  insulin lispro (ADMELOG) corrective regimen sliding scale   SubCutaneous three times a day before meals  insulin lispro Injectable (ADMELOG) 9 Unit(s) SubCutaneous three times a day before meals  lisinopril 5 milliGRAM(s) Oral daily  metoprolol tartrate 50 milliGRAM(s) Oral two times a day  pantoprazole    Tablet 40 milliGRAM(s) Oral before breakfast  remdesivir  IVPB 100 milliGRAM(s) IV Intermittent every 24 hours  remdesivir  IVPB   IV Intermittent     PRN MEDICATIONS  acetaminophen   Tablet .. 650 milliGRAM(s) Oral every 6 hours PRN      VITAL SIGNS: Last 24 Hours  T(C): 36.3 (18 Mar 2021 13:46), Max: 39.5 (18 Mar 2021 04:00)  T(F): 97.3 (18 Mar 2021 13:46), Max: 103.1 (18 Mar 2021 04:00)  HR: 68 (18 Mar 2021 13:46) (68 - 89)  BP: 133/58 (18 Mar 2021 13:46) (133/58 - 160/81)  BP(mean): --  RR: 18 (18 Mar 2021 13:46) (18 - 18)  SpO2: 92% (18 Mar 2021 13:46) (92% - 94%)    LABS:                        8.9    6.60  )-----------( 107      ( 18 Mar 2021 06:23 )             27.2     03-18    140  |  108  |  34<H>  ----------------------------<  257<H>  4.1   |  19  |  1.0    Ca    7.5<L>      18 Mar 2021 06:23  Mg     1.9     03-18    TPro  4.9<L>  /  Alb  2.6<L>  /  TBili  <0.2  /  DBili  x   /  AST  43<H>  /  ALT  17  /  AlkPhos  52  03-18              Culture - Blood (collected 15 Mar 2021 21:38)  Source: .Blood Blood  Preliminary Report (17 Mar 2021 07:01):    No growth to date.    Culture - Blood (collected 15 Mar 2021 21:37)  Source: .Blood Blood  Preliminary Report (17 Mar 2021 07:01):    No growth to date.    Culture - Urine (collected 15 Mar 2021 21:02)  Source: .Urine Catheterized  Final Report (18 Mar 2021 13:33):    >100,000 CFU/ml Escherichia coli  Organism: Escherichia coli (18 Mar 2021 13:33)  Organism: Escherichia coli (18 Mar 2021 13:33)      CARDIAC MARKERS ( 17 Mar 2021 07:28 )  x     / 0.02 ng/mL / x     / x     / x            PHYSICAL EXAM:    GENERAL: NAD, well-developed, AAOx1  HEENT:  Atraumatic, Normocephalic. EOMI, PERRLA, conjunctiva and sclera clear, No JVD  PULMONARY: Clear to auscultation bilaterally; No wheeze  CARDIOVASCULAR: Regular rate and rhythm; No murmurs, rubs, or gallops  GASTROINTESTINAL: Soft, Nontender, Nondistended; Bowel sounds present, No CVA tenderness  MUSCULOSKELETAL:  2+ Peripheral Pulses, No clubbing, cyanosis, or edema  NEUROLOGY: non-focal  SKIN: No rashes or lesions      ASSESSMENT & PLAN    Patient is a 89y/o man with PMH of HTN, HLD, epilepsy, CVA (R-sided deficits) who presents with altered mental status. Course complicated by COVID+, UTI.    1) Toxic metabolic encephalopathy secondary to infection (pyelo, COVID)   -CT head (3/15) negative for acute pathology  -CTA head/neck (15) with no occlusion   -Urine cultures growing E.Coli  -Infectious disease consult appreciated  -Continue rocephin 1g q24hrs (started 3/16)  -Stroke work-up when stable: TTE, MR head  -Continue ASA/plavix    2) Acute hypoxemic respiratory failure secondary to COVID-19  -Today on room air  -Inflammatory markers 3/17: ferritin-750, CRP-296, procal-1.780  - continue with Remdesivir 200mg first day, followed by 100mg daily for 4 days (end 3/20)  - continue with Decadron 6mg qd for total of 10 days or until hospital discharge    3) Elevated troponin  -EKG with PACs  - trops stable at 0.02, likely chronically elevated    4) HTN:  -Continue lisinopril 5mg q24hrs, metoprolol 50mg BID    5) Epilepsy  -continue carbamazepine 400mg q24hrs, depakote 500mg q24hrs    6) Hyperglycemia  -No diagnosis of DM; elevation may be secondary to steroids  -Lantus 20 qhs, lispro 9/9/9  -POCT glucose    MISC:  -DVT PPX: Lovenox  -GI PPX: PPI  -Diet: DASH/Carb-consistent  -Activity: As tolerated  -Code: FULL  -Dispo: Acute

## 2021-03-19 NOTE — PHYSICAL THERAPY INITIAL EVALUATION ADULT - GAIT TRAINING, PT EVAL
The patient will improve gait using an appropriate AD with Min A for 20 feet by discharge to decrease burden of care.

## 2021-03-19 NOTE — PROGRESS NOTE ADULT - ATTENDING COMMENTS
Patient seen and evaluated at bedside. Patient had fever overnight which resolved. Continue antibiotics for UTI. Will need follow up with urologist due to presence of allred catheter. pending PT/OT eval. Continue to monitor condition. Of note, patient has hypoglycemia this morning. Will decrease insulin dosages and reassess.

## 2021-03-19 NOTE — PROGRESS NOTE ADULT - ASSESSMENT
89 yo M with PMHx of HTN, HLD, Epilepsy ( on VPA and carbamazepine) , CVA with R sided deficits presented with acute change in mental status per son. Sun antedates that about two hours prior to arrival, the patient was perfectly fine and discussing stocks with his son. Suddenly became obtunded. The son did not notice any dysarthria, aphasia, or worsened weakness. On arrival pt was febrile to 103 F and hypoxic at 80% on room air and on NRB saturates only 91-92.  Called the house and spoke to the aide who said that the patient did not have any temperate at home and was doing well until Monday when his po intake decreased. The patient has family visiting him all the time and they went to a dinner in [place] over the weekend as well.     Patient was found to be febrile and hypoxic on arrival. COVID swab came out positive. Blood work revealed slight anaemia, elevated lactate, troponin, a positive UA, therapeutic levels of AED. CT head and CTA head and neck did not show any acute stroke or occlusion.    # Acute hypoxic respiratory failure due to COVID PNA  - Maintain on airborne and contact isolation.  - Improved on 4L , titrated down to RA today.   - AC per protocol: Lovenox 40 mg SQ BID as pt's BMI > 30.   - continue with Remdesivir 200mg first day, followed by 100mg daily for 4 days, - monitor LFTS  - continue with Decadron 6mg qd for total of 10 days or until hospital discharge,   - monitor FS    # Acute pyelonephritis due to Ecoli   -c/w Ceftriaxone ( sensitive)    #Elevated troponin  -EKG with PACs  - trops stable at 0.02, likely chronically elevated    #Altered Mental status >> likely due to metabolic encephalopathy , Doubt  stroke:   Improving.   -neurology on board, CT head and CTA head and neck did not show any acute stroke or occlusion.  -echo and MR head when stable from COVID-19 point of view  -continue ASA Plavix and steroids  -ct head shows Absence of flow related contrast in the left cerebral hemisphere corresponding to a large area of encephalomalacia.     # Hx of CVA with right hemiparesis/ Seizure d/o      On AED, ASA, Statin .        HTN:  -C/w lisinopril and metoprolol    DVT PPX: LVX  FULL CODE  CHG     #Progress Note Handoff  Pending (specify):  Clinical improvement/ Stability  Family discussion: As per communication team.  Disposition: Home with A 24/7    89 yo M with PMHx of HTN, HLD, Epilepsy ( on VPA and carbamazepine) , CVA with R sided deficits presented with acute change in mental status per son. Sun antedates that about two hours prior to arrival, the patient was perfectly fine and discussing stocks with his son. Suddenly became obtunded. The son did not notice any dysarthria, aphasia, or worsened weakness. On arrival pt was febrile to 103 F and hypoxic at 80% on room air and on NRB saturates only 91-92.  Called the house and spoke to the aide who said that the patient did not have any temperate at home and was doing well until Monday when his po intake decreased. The patient has family visiting him all the time and they went to a dinner in [place] over the weekend as well.     Patient was found to be febrile and hypoxic on arrival. COVID swab came out positive. Blood work revealed slight anaemia, elevated lactate, troponin, a positive UA, therapeutic levels of AED. CT head and CTA head and neck did not show any acute stroke or occlusion.    # Acute hypoxic respiratory failure due to COVID PNA  - Maintain on airborne and contact isolation.  - Remdesivir 3/16 to 3/20  - Dexamethasone 3/16 to 3/25  - Supplemental oxygen as needed.     # Acute pyelonephritis due to Ecoli   - s/p Ceftriaxone 3/16 to 3/19  - Vantin 3/19 to 3/22  - Monitor for fevers  - Has allred catheter. Assess for trial of void when patient can ambulate    #Altered Mental status >> likely due to metabolic encephalopathy , Doubt  stroke:   Improving.   -neurology on board, CT head and CTA head and neck did not show any acute stroke or occlusion.  -echo and MR head when stable from COVID-19 point of view  -continue ASA Plavix and steroids  -ct head shows Absence of flow related contrast in the left cerebral hemisphere corresponding to a large area of encephalomalacia.   - Continue to monitor    # Hx of CVA with right hemiparesis/ Seizure d/o    - On AED, ASA, Statin .        HTN:  -C/w lisinopril and metoprolol    DVT PPX: LVX  FULL CODE    #Progress Note Handoff  Pending (specify):  Clinical improvement/ Stability  Family discussion: As per communication team.  Disposition: Home with [facility]HA 24/7

## 2021-03-19 NOTE — PHYSICAL THERAPY INITIAL EVALUATION ADULT - PERTINENT HX OF CURRENT PROBLEM, REHAB EVAL
88 yo M with PMHx of HTN, HLD, Epilepsy ( on VPA and carbamazepine) , CVA with R sided deficits presented with acute change in mental status per son.

## 2021-03-19 NOTE — PHYSICAL THERAPY INITIAL EVALUATION ADULT - GENERAL OBSERVATIONS, REHAB EVAL
PT IE completed. [id number]. Patient encountered semifowler in bed, in NAD, +IV lock, on RA, agreeable to therapy. Pt with R UE deformity (shorter than L UE), +weakness on R side of body. Able to follow commands.

## 2021-03-19 NOTE — PHYSICAL THERAPY INITIAL EVALUATION ADULT - TRANSFER TRAINING, PT EVAL
The patient will improve transfers to [facility] A using an appropriate AD by discharge  to decrease burden of care.

## 2021-03-20 NOTE — OCCUPATIONAL THERAPY INITIAL EVALUATION ADULT - PERTINENT HX OF CURRENT PROBLEM, REHAB EVAL
89 yo M with PMHx of HTN, HLD, Epilepsy ( on VPA and carbamazepine) , CVA with R sided deficits presented with acute change in mental status per son. Sun antedates that about two hours prior to arrival, the patient was perfectly fine and discussing stocks with his son. Suddenly became obtunded. The son did not notice any dysarthria, aphasia, or worsened weakness. On arrival pt was febrile to 103 F and hypoxic at 80% on room air and on NRB saturates only 91-92.

## 2021-03-20 NOTE — CHART NOTE - NSCHARTNOTEFT_GEN_A_CORE
Discussed with case management and family, patient is scheduled to go home on Monday as family has not yet "prepared the room" for him.

## 2021-03-20 NOTE — PROGRESS NOTE ADULT - ASSESSMENT
89 yo M with PMHx of HTN, HLD, Epilepsy ( on VPA and carbamazepine) , CVA with R sided deficits presented with acute change in mental status per son. Sun antedates that about two hours prior to arrival, the patient was perfectly fine and discussing stocks with his son. Suddenly became obtunded. The son did not notice any dysarthria, aphasia, or worsened weakness. On arrival pt was febrile to 103 F and hypoxic at 80% on room air and on NRB saturates only 91-92.  Called the house and spoke to the aide who said that the patient did not have any temperate at home and was doing well until Monday when his po intake decreased. The patient has family visiting him all the time and they went to a dinner in [place] over the weekend as well.     Patient was found to be febrile and hypoxic on arrival. COVID swab came out positive. Blood work revealed slight anaemia, elevated lactate, troponin, a positive UA, therapeutic levels of AED. CT head and CTA head and neck did not show any acute stroke or occlusion.    # Acute hypoxic respiratory failure due to COVID PNA  - Maintain on airborne and contact isolation.  - Remdesivir 3/16 to 3/20  - Dexamethasone 3/16 to 3/25  - Supplemental oxygen as needed.     # Acute pyelonephritis due to Ecoli   - s/p Ceftriaxone 3/16 to 3/19  - Vantin 3/19 to 3/22  - Monitor for fevers  - Has allred catheter. Assess for trial of void when patient can ambulate    #Altered Mental status >> likely due to metabolic encephalopathy , Doubt  stroke:   Improving.   -neurology on board, CT head and CTA head and neck did not show any acute stroke or occlusion.  -echo and MR head when stable from COVID-19 point of view  -continue ASA Plavix and steroids  -ct head shows Absence of flow related contrast in the left cerebral hemisphere corresponding to a large area of encephalomalacia.   - Continue to monitor    # Hx of CVA with right hemiparesis/ Seizure d/o    - On AED, ASA, Statin .        HTN:  -C/w lisinopril and metoprolol    DVT PPX: LVX  FULL CODE    #Progress Note Handoff  Pending (specify):  Clinical improvement/ Stability  Family discussion: As per communication team.  Disposition: Home with HHA 24/7

## 2021-03-20 NOTE — PROGRESS NOTE ADULT - PROVIDER SPECIALTY LIST ADULT
Hospitalist
Internal Medicine
Hospitalist
Infectious Disease
Internal Medicine
Internal Medicine

## 2021-03-20 NOTE — OCCUPATIONAL THERAPY INITIAL EVALUATION ADULT - GENERAL OBSERVATIONS, REHAB EVAL
8:26-9:55 chat reviewed per RN pt ok to be seen by OT, received in bed +brigida, 4LO2 via NC SpO2 94% pt agreeable to OT luna, PT present

## 2021-03-21 NOTE — PROGRESS NOTE ADULT - SUBJECTIVE AND OBJECTIVE BOX
INTERVAL HPI/OVERNIGHT EVENTS:    SUBJECTIVE: Patient seen and examined at bedside.     no cp, sob, abd pain, fever  no sob, orthopnea, pnd, cough    OBJECTIVE:    VITAL SIGNS:  Vital Signs Last 24 Hrs  T(C): 37.9 (21 Mar 2021 07:27), Max: 38.8 (21 Mar 2021 02:44)  T(F): 100.2 (21 Mar 2021 07:27), Max: 101.8 (21 Mar 2021 02:44)  HR: 94 (21 Mar 2021 07:47) (68 - 117)  BP: 152/65 (21 Mar 2021 07:27) (123/68 - 152/68)  BP(mean): 89 (20 Mar 2021 16:00) (89 - 89)  RR: 19 (21 Mar 2021 07:27) (14 - 26)  SpO2: 100% (21 Mar 2021 07:47) (76% - 100%)      PHYSICAL EXAM:    General: NAD  HEENT: NC/AT; PERRL, clear conjunctiva  Neck: supple  Respiratory: diffuse crackles  Cardiovascular: +S1/S2; RRR  Abdomen: soft, NT/ND; +BS x4  Extremities: WWP, 2+ peripheral pulses b/l; no LE edema  Skin: normal color and turgor; no rash  Neurological:    MEDICATIONS:  MEDICATIONS  (STANDING):  atorvastatin 20 milliGRAM(s) Oral at bedtime  carBAMazepine ER Tablet 200 milliGRAM(s) Oral two times a day  cefepime   IVPB 1000 milliGRAM(s) IV Intermittent every 12 hours  clopidogrel Tablet 75 milliGRAM(s) Oral daily  dextrose 40% Gel 15 Gram(s) Oral once  dextrose 5%. 1000 milliLiter(s) (50 mL/Hr) IV Continuous <Continuous>  dextrose 5%. 1000 milliLiter(s) (100 mL/Hr) IV Continuous <Continuous>  dextrose 50% Injectable 25 Gram(s) IV Push once  dextrose 50% Injectable 12.5 Gram(s) IV Push once  dextrose 50% Injectable 25 Gram(s) IV Push once  diVALproex  milliGRAM(s) Oral daily  enoxaparin Injectable 40 milliGRAM(s) SubCutaneous daily  glucagon  Injectable 1 milliGRAM(s) IntraMuscular once  insulin glargine Injectable (LANTUS) 16 Unit(s) SubCutaneous at bedtime  insulin lispro (ADMELOG) corrective regimen sliding scale   SubCutaneous three times a day before meals  insulin lispro Injectable (ADMELOG) 4 Unit(s) SubCutaneous three times a day before meals  lisinopril 5 milliGRAM(s) Oral daily  metoprolol tartrate 50 milliGRAM(s) Oral two times a day    MEDICATIONS  (PRN):      ALLERGIES:  Allergies    Ambien (Other)  Dilantin (Unknown)  phenobarbital (Unknown)    Intolerances        LABS:                        9.2    6.27  )-----------( 213      ( 21 Mar 2021 02:31 )             28.8     Hemoglobin: 9.2 g/dL ( @ 02:31)  Hemoglobin: 8.9 g/dL ( @ 06:23)  Hemoglobin: 8.8 g/dL ( @ 07:28)    CBC Full  -  ( 21 Mar 2021 02:31 )  WBC Count : 6.27 K/uL  RBC Count : 2.91 M/uL  Hemoglobin : 9.2 g/dL  Hematocrit : 28.8 %  Platelet Count - Automated : 213 K/uL  Mean Cell Volume : 99.0 fL  Mean Cell Hemoglobin : 31.6 pg  Mean Cell Hemoglobin Concentration : 31.9 g/dL  Auto Neutrophil # : 5.62 K/uL  Auto Lymphocyte # : 0.49 K/uL  Auto Monocyte # : 0.11 K/uL  Auto Eosinophil # : 0.00 K/uL  Auto Basophil # : 0.00 K/uL  Auto Neutrophil % : 89.6 %  Auto Lymphocyte % : 7.8 %  Auto Monocyte % : 1.7 %  Auto Eosinophil % : 0.0 %  Auto Basophil % : 0.0 %        142  |  108  |  31<H>  ----------------------------<  134<H>  4.4   |  21  |  1.1    Ca    7.6<L>      21 Mar 2021 02:31    TPro  5.4<L>  /  Alb  2.6<L>  /  TBili  0.3  /  DBili  x   /  AST  40  /  ALT  26  /  AlkPhos  83      Creatinine Trend: 1.1<--, 1.0<--, 1.1<--, 1.5<--  LIVER FUNCTIONS - ( 21 Mar 2021 02:31 )  Alb: 2.6 g/dL / Pro: 5.4 g/dL / ALK PHOS: 83 U/L / ALT: 26 U/L / AST: 40 U/L / GGT: x               hs Troponin:            Urinalysis Basic - ( 21 Mar 2021 04:00 )    Color: Yellow / Appearance: Clear / S.010 / pH: x  Gluc: x / Ketone: Negative  / Bili: Negative / Urobili: <2 mg/dL   Blood: x / Protein: 30 mg/dL / Nitrite: Negative   Leuk Esterase: Negative / RBC: 3 /HPF / WBC 1 /HPF   Sq Epi: x / Non Sq Epi: 1 /HPF / Bacteria: Negative      CSF:                      EKG:   MICROBIOLOGY:    Culture - Blood (collected 18 Mar 2021 12:03)  Source: .Blood None  Preliminary Report (19 Mar 2021 22:02):    No growth to date.      IMAGING:      Labs, imaging, EKG personally reviewed    RADIOLOGY & ADDITIONAL TESTS: Reviewed.

## 2021-03-21 NOTE — CONSULT NOTE ADULT - SUBJECTIVE AND OBJECTIVE BOX
Patient is a 89y old  Male with PMHx of HTN, HLD, Epilepsy ( on VPA and carbamazepine) , CVA with Right sided deficits, recent COVID infection rehabilitating at [facility] for hypoxemia at Covid unit. pt was planned to be discharged today, 3/21/21 but then desatted to 81 % requiring NRB 15L, so he was sent to ER for further evaluation. On arrival, he found to have fever, tachycardia and  hypoxic at 80% on room air and on NRB saturates only 91-92. The CXR shows Increased bilateral opacities. He completed the Remdesivir and currently on dexamethasone. The ID consult requested to assist with further evaluation and antibiotic management.       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  Diabetes mellitus  HTN (hypertension)  Seizures  CVA (cerebral vascular accident)   Acute subdural hematoma  With surgical intervention  History of total hip replacement, right  H/O hernia repair        SOCIAL HISTORY  Alcohol: Does not drink  Tobacco: Does not smoke  Illicit substance use: None      FAMILY HISTORY: Non contributory to the present illness        ALLERGIES: Ambien (Other)  Dilantin (Unknown)  phenobarbital (Unknown)        PHYSICAL EXAM:    Vital Signs Last 24 Hrs  T(C): 38.7 (21 Mar 2021 13:59), Max: 38.8 (21 Mar 2021 02:44)  T(F): 101.7 (21 Mar 2021 13:59), Max: 101.8 (21 Mar 2021 02:44)  HR: 96 (21 Mar 2021 10:20) (68 - 117)  BP: 144/67 (21 Mar 2021 13:59) (123/68 - 154/67)  BP(mean): 89 (20 Mar 2021 16:00) (89 - 89)  RR: 20 (21 Mar 2021 13:59) (14 - 26)  SpO2: 100% (21 Mar 2021 13:59) (76% - 100%)        LABS:                        9.2    6.27  )-----------( 213      ( 21 Mar 2021 02:31 )             28.8         142  |  108  |  31<H>  ----------------------------<  134<H>  4.4   |  21  |  1.1    Ca    7.6<L>      21 Mar 2021 02:31    TPro  5.4<L>  /  Alb  2.6<L>  /  TBili  0.3  /  DBili  x   /  AST  40  /  ALT  26  /  AlkPhos  83  -      CAPILLARY BLOOD GLUCOSE  POCT Blood Glucose.: 150 mg/dL (20 Mar 2021 21:23)  POCT Blood Glucose.: 139 mg/dL (20 Mar 2021 16:44)        Urinalysis Basic - ( 21 Mar 2021 04:00 )  Color: Yellow / Appearance: Clear / S.010 / pH: x  Gluc: x / Ketone: Negative  / Bili: Negative / Urobili: <2 mg/dL   Blood: x / Protein: 30 mg/dL / Nitrite: Negative   Leuk Esterase: Negative / RBC: 3 /HPF / WBC 1 /HPF   Sq Epi: x / Non Sq Epi: 1 /HPF / Bacteria: Negative      D-Dimer Assay, Quantitative (21 @ 02:31)   D-Dimer Assay, Quantitative: 400      MEDICATIONS  (STANDING):  atorvastatin 20 milliGRAM(s) Oral at bedtime  carBAMazepine ER Tablet 200 milliGRAM(s) Oral two times a day  cefepime   IVPB 1000 milliGRAM(s) IV Intermittent every 12 hours  clopidogrel Tablet 75 milliGRAM(s) Oral daily  dexAMETHasone  Injectable 6 milliGRAM(s) IV Push daily  dextrose 40% Gel 15 Gram(s) Oral once  dextrose 5%. 1000 milliLiter(s) (50 mL/Hr) IV Continuous <Continuous>  dextrose 5%. 1000 milliLiter(s) (100 mL/Hr) IV Continuous <Continuous>  dextrose 50% Injectable 25 Gram(s) IV Push once  dextrose 50% Injectable 12.5 Gram(s) IV Push once  dextrose 50% Injectable 25 Gram(s) IV Push once  diVALproex  milliGRAM(s) Oral daily  enoxaparin Injectable 40 milliGRAM(s) SubCutaneous daily  glucagon  Injectable 1 milliGRAM(s) IntraMuscular once  insulin glargine Injectable (LANTUS) 16 Unit(s) SubCutaneous at bedtime  insulin lispro (ADMELOG) corrective regimen sliding scale   SubCutaneous three times a day before meals  insulin lispro Injectable (ADMELOG) 4 Unit(s) SubCutaneous three times a day before meals  lisinopril 5 milliGRAM(s) Oral daily  metoprolol tartrate 50 milliGRAM(s) Oral two times a day    MEDICATIONS  (PRN):  acetaminophen   Tablet .. 650 milliGRAM(s) Oral every 6 hours PRN Temp greater or equal to 38C (100.4F), Mild Pain (1 - 3)        RADIOLOGY & ADDITIONAL TESTS:    < from: Xray Chest 1 View-PORTABLE IMMEDIATE (21 @ 04:57) >  Increased bilateral opacities. No definite pneumothorax.      MICROBIOLOGY DATA:    Culture - Blood (21 @ 12:03)   Specimen Source: .Blood None   Culture Results: No growth to date.     Respiratory Viral Panel with COVID-19 by JONAS (03.15.21 @ 22:52)   Rapid RVP Result: Detected   SARS-CoV-2: Detected:

## 2021-03-21 NOTE — CHART NOTE - NSCHARTNOTEFT_GEN_A_CORE
88 yo M with PMHx of HTN, HLD, Epilepsy ( on VPA and carbamazepine) , CVA with R sided deficits presented with acute change in mental status per son. Sun antedates that about two hours prior to arrival, the patient was perfectly fine and discussing stocks with his son. Suddenly became obtunded. The son did not notice any dysarthria, aphasia, or worsened weakness. On arrival pt was febrile to 103 F and hypoxic at 80% on room air and on NRB saturates only 91-92.  Called the house and spoke to the aide who said that the patient did not have any temperate at home and was doing well until Monday when his po intake decreased. The patient has family visiting him all the time and they went to a dinner in [place] over the weekend as well.     Patient was found to be febrile and hypoxic on arrival. COVID swab came out positive. Blood work revealed slight anaemia, elevated lactate, troponin, a positive UA, therapeutic levels of AED. CT head and CTA head and neck did not show any acute stroke or occlusion.    Patient had been treated in [facility] for the following problems:    # Acute hypoxic respiratory failure due to COVID PNA  - Remdesivir 3/16 to 3/20  - Dexamethasone 3/16 to 3/25  >> Currently on high flow NC  >> CXR shows evidence of consolidation in RLL?  >> Pending Procal, CRP  >> Starting empirically on cefepime    # Acute pyelonephritis due to Ecoli   - s/p Ceftriaxone 3/16 to 3/19  - Vantin 3/19 to 3/22  >> Currently stable    #Altered Mental status >> likely due to metabolic encephalopathy , Doubt  stroke:   Improving.   - CT head and CTA head and neck did not show any acute stroke or occlusion.  - echo and MR head still pending for when stable from COVID-19 point of view  - On ASA Plavix and steroids  - ct head shows Absence of flow related contrast in the left cerebral hemisphere corresponding to a large area of encephalomalacia.     # Hx of CVA with right hemiparesis/ Seizure d/o    - On AED, ASA, Statin .      # HTN:  -C/w lisinopril and metoprolol    # Type II DM  - On insulin scale  - Pending HbA1c    On 3/20 >>     Patient transferred from [facility] for worsening hypoxemia.   As per transfer note, at 12am vital check patient had SpO2 of 76-78% on 4L NC, oxygen increased to 25LNRB and then titrated down to 15LNRB. Kept patient on 15LNRB for 30mins. After 30mins began to slowly titrate down patient back to 5LNC, however during the process patient appeared more tachypneic and his SpO2 dropped to 89-90%.   Transferred back to [facility] for adequate management.     Currently in the ED >>    PHYSICAL EXAM:  General: A/ox 2, No acute Distress  Neck: Supple, NO JVD  Cardiac: S1 S2 heard regular  Pulmonary: Good bilateral breath sounds, bilateral crackles  Abdomen: Soft, Non -tender, +BS   Extremities: No Rashes, No edema  Neuro: Right sided motor weakness 0/5 RUL, 1/5 RLL, ALLI 3/5 LLL 5/5    T(C): 37.9 (03-21-21 @ 07:27), Max: 38.8 (03-21-21 @ 02:44)  HR: 94 (03-21-21 @ 07:47) (68 - 117)  BP: 152/65 (03-21-21 @ 07:27) (123/68 - 152/68)  RR: 19 (03-21-21 @ 07:27) (14 - 26)  SpO2: 100% (03-21-21 @ 07:47) (76% - 100%)    LABS:                        9.2    6.27  )-----------( 213      ( 21 Mar 2021 02:31 )             28.8     03-21    142  |  108  |  31<H>  ----------------------------<  134<H>  4.4   |  21  |  1.1    Ca    7.6<L>      21 Mar 2021 02:31    TPro  5.4<L>  /  Alb  2.6<L>  /  TBili  0.3  /  DBili  x   /  AST  40  /  ALT  26  /  AlkPhos  83  03-21      Bilirubin: Negative (03-21-21 @ 04:00)  Aspartate Aminotransferase (AST/SGOT): 40 U/L (03-21-21 @ 02:31)  Alanine Aminotransferase (ALT/SGPT): 26 U/L (03-21-21 @ 02:31)      DVT PPX: LVX  FULL CODE 89 yo M with PMHx of HTN, HLD, Epilepsy ( on VPA and carbamazepine) , CVA with R sided deficits presented with acute change in mental status per son. Sun antedates that about two hours prior to arrival, the patient was perfectly fine and discussing stocks with his son. Suddenly became obtunded. The son did not notice any dysarthria, aphasia, or worsened weakness. On arrival pt was febrile to 103 F and hypoxic at 80% on room air and on NRB saturates only 91-92.  Called the house and spoke to the aide who said that the patient did not have any temperate at home and was doing well until Monday when his po intake decreased. The patient has family visiting him all the time and they went to a dinner in [place] over the weekend as well.     Patient was found to be febrile and hypoxic on arrival. COVID swab came out positive. Blood work revealed slight anaemia, elevated lactate, troponin, a positive UA, therapeutic levels of AED. CT head and CTA head and neck did not show any acute stroke or occlusion.    Patient had been treated in [facility] for the following problems:    # Acute hypoxic respiratory failure due to COVID PNA  - Remdesivir 3/16 to 3/20  - Dexamethasone 3/16 to 3/25  >> Currently on high flow NC  >> CXR shows evidence of consolidation in RLL?  >> Pending Procal, CRP  >> Starting empirically on cefepime    # Acute pyelonephritis due to Ecoli   - s/p Ceftriaxone 3/16 to 3/19  - Vantin 3/19 to 3/22  >> Currently stable    #Altered Mental status >> likely due to metabolic encephalopathy , Doubt  stroke:   Improving.   - CT head and CTA head and neck did not show any acute stroke or occlusion.  - echo and MR head still pending for when stable from COVID-19 point of view  - On Plavix  - ct head shows Absence of flow related contrast in the left cerebral hemisphere corresponding to a large area of encephalomalacia.     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      # HTN:  -C/w lisinopril and metoprolol    # Type II DM  - On insulin scale  - Pending HbA1c    On 3/20 >>     Patient transferred from [facility] for worsening hypoxemia.   As per transfer note, at 12am vital check patient had SpO2 of 76-78% on 4L NC, oxygen increased to 25LNRB and then titrated down to 15LNRB. Kept patient on 15LNRB for 30mins. After 30mins began to slowly titrate down patient back to 5LNC, however during the process patient appeared more tachypneic and his SpO2 dropped to 89-90%.   Transferred back to [facility] for adequate management.     Currently in the ED >>    PHYSICAL EXAM:  General: A/ox 2, No acute Distress  Neck: Supple, NO JVD  Cardiac: S1 S2 heard regular  Pulmonary: Good bilateral breath sounds, bilateral crackles  Abdomen: Soft, Non -tender, +BS   Extremities: No Rashes, No edema  Neuro: Right sided motor weakness 0/5 RUL, 1/5 RLL, ALLI 3/5 LLL 5/5    T(C): 37.9 (03-21-21 @ 07:27), Max: 38.8 (03-21-21 @ 02:44)  HR: 94 (03-21-21 @ 07:47) (68 - 117)  BP: 152/65 (03-21-21 @ 07:27) (123/68 - 152/68)  RR: 19 (03-21-21 @ 07:27) (14 - 26)  SpO2: 100% (03-21-21 @ 07:47) (76% - 100%)    LABS:                        9.2    6.27  )-----------( 213      ( 21 Mar 2021 02:31 )             28.8     03-21    142  |  108  |  31<H>  ----------------------------<  134<H>  4.4   |  21  |  1.1    Ca    7.6<L>      21 Mar 2021 02:31    TPro  5.4<L>  /  Alb  2.6<L>  /  TBili  0.3  /  DBili  x   /  AST  40  /  ALT  26  /  AlkPhos  83  03-21      Bilirubin: Negative (03-21-21 @ 04:00)  Aspartate Aminotransferase (AST/SGOT): 40 U/L (03-21-21 @ 02:31)  Alanine Aminotransferase (ALT/SGPT): 26 U/L (03-21-21 @ 02:31)      DVT PPX: LVX  FULL CODE

## 2021-03-21 NOTE — CHART NOTE - NSCHARTNOTEFT_GEN_A_CORE
Got call that during 12am vital check patient had SpO2 of 76-78% on 4L NC; at that time we increased patient to 25LNRB and then titrated down to 15LNRB. Kept patient on 15LNRB for 30mins. After 30mins we began to slowly titrate down patient back to 5LNC, however during the process patient appeared more tachypneic and his SpO2 dropped to 89-90%.     Will send patient back to the ED for to allow for patient's new higher oxygenation requirements to be met.    Attempted to call emergency contact #, daughter Amanda at [phone], to make family aware of transfer however reached voicemail

## 2021-03-21 NOTE — CONSULT NOTE ADULT - ASSESSMENT
Patient is a 89y old  Male with PMHx of HTN, HLD, Epilepsy ( on VPA and carbamazepine) , CVA with Right sided deficits, recent COVID infection rehabilitating at [facility] for hypoxemia at Covid unit. pt was planned to be discharged today, 3/21/21 but then desatted to 81 % requiring NRB 15L, so he was sent to ER for further evaluation. On arrival, he found to have fever, tachycardia and  hypoxic at 80% on room air and on NRB saturates only 91-92. The CXR shows Increased bilateral opacities. He completed the Remdesivir and currently on dexamethasone. The ID consult requested to assist with further evaluation and antibiotic management.     #  Sepsis ( Fever + tachycardia + Hypoxia )  # Acute Respiratory failure - COVID Pneumonia VS Pulmonary embolism   # COVID pneumonia-3/156- S/p completed Remdesivir and on dexamethasone     Would recommend:    1. Please obtain CT chest to rule out Pulmonary embolism since D-Dimer elevated  2. Continue  Dexamethasone to complete the course  3. Monitor Temp. and c/w supportive care  4. Management of Bipap as per Primary  team   5. Continue  Bronchodilator as needed   6. COVID  and Aspiration precautions  7. Continue supportive care including  Anticoagulation  8. Follow up cultures, procalcitonin and CRP level    will follow the patient with you and make further recommendation based on the clinical course and Lab results  Thank you for the opportunity to participate in Mr. CARROLL's care      Attending Attestation:    Spent more than 65 minutes on total encounter, more than 50 % of the visit was spent counseling and/or coordinating care by the Attending physician.

## 2021-03-21 NOTE — ED ADULT NURSE NOTE - NSIMPLEMENTINTERV_GEN_ALL_ED
Implemented All Fall with Harm Risk Interventions:  Couch to call system. Call bell, personal items and telephone within reach. Instruct patient to call for assistance. Room bathroom lighting operational. Non-slip footwear when patient is off stretcher. Physically safe environment: no spills, clutter or unnecessary equipment. Stretcher in lowest position, wheels locked, appropriate side rails in place. Provide visual cue, wrist band, yellow gown, etc. Monitor gait and stability. Monitor for mental status changes and reorient to person, place, and time. Review medications for side effects contributing to fall risk. Reinforce activity limits and safety measures with patient and family. Provide visual clues: red socks.

## 2021-03-21 NOTE — SWALLOW BEDSIDE ASSESSMENT ADULT - COMMENTS
Mod oral dysphagia with regular solids, mild oral dysphagia with mechanical soft + toleration for regular solids, mechanical soft  without overt s/s of aspiration/penetration. + toleration without overt s/s of aspiration/penetration.

## 2021-03-21 NOTE — SWALLOW BEDSIDE ASSESSMENT ADULT - SWALLOW EVAL: RECOMMENDED FEEDING/EATING TECHNIQUES
allow for swallow between intakes/alternate food with liquid/check mouth frequently for oral residue/pocketing/maintain upright posture during/after eating for 30 mins/oral hygiene/small sips/bites

## 2021-03-21 NOTE — ED PROVIDER NOTE - CLINICAL SUMMARY MEDICAL DECISION MAKING FREE TEXT BOX
patient remained hemodynamically stable, results of the labs, imaging findings reviewed and discussed with patient, tolerating BIPAP well, discussed with admitting physician and MAR, patient is admitted to Medicine for further evaluation and care.

## 2021-03-21 NOTE — SWALLOW BEDSIDE ASSESSMENT ADULT - ASR SWALLOW LINGUAL MOBILITY
Decreased lingual ROM/strength/impaired protrusion/impaired anterior elevation/impaired left lateral movement/impaired right lateral movement

## 2021-03-21 NOTE — ED ADULT NURSE REASSESSMENT NOTE - NS ED NURSE REASSESS COMMENT FT1
Pt assessed. Pt is awake; axox2. Pt reoriented as needed. Pt on Bipap sat 100%. Pt tried to remove bipap earlier; reiterated to pt importance of leaving it on. Bolus running. Stevens care rendered. Pt is afebrile. Pt is not in any distress. Pt admitted; awaiting bed. Fall precautions maintained. Safety and comfort

## 2021-03-21 NOTE — ED PROVIDER NOTE - PHYSICAL EXAMINATION
CONSTITUTIONAL: Well-developed; well-nourished; in no acute distress.   SKIN: warm, dry  HEAD: Normocephalic; atraumatic.  EYES: PERRL, EOMI, normal sclera and conjunctiva   ENT: No nasal discharge; airway clear.  NECK: Supple; non tender.  CARD: S1, S2 normal; no murmurs, gallops, or rubs. Regular rate and rhythm.   RESP: No wheezes, rales or rhonchi.  ABD: soft ntnd  EXT: Normal ROM.  No clubbing, cyanosis or edema.   LYMPH: No acute cervical adenopathy.  NEURO: Alert, oriented, +R sided contracted, atrophic, 5/5 strength on L side.   PSYCH: Cooperative, appropriate.

## 2021-03-21 NOTE — ED PROVIDER NOTE - OBJECTIVE STATEMENT
pt is a 89 yom w/ CVA R sided deficit, DM, HTN, recent covid infection rehabilitating at [facility] here for hypoxemia at Covid unit. pt was planned to be discharged today but then desatted to 81 requiring NRB 15L, so he was sent to ED for eval. no new sob or CP, no abd pain, n/v/d

## 2021-03-21 NOTE — SWALLOW BEDSIDE ASSESSMENT ADULT - SLP PERTINENT HISTORY OF CURRENT PROBLEM
89 year old Male patient admitted to ED with AMS. Pt. transferred from [facility] for worsening hypoxia/ sepsis due to COVID.  PMHx HTN, seizure disorder, DM2, CVA with R side deficits  CXR (3/21/21) indicates increased bilateral opacities.

## 2021-03-21 NOTE — PROGRESS NOTE ADULT - ASSESSMENT
89M PMHx HTN, seizure disorder, DM2, CVA with R side deficits transferred from [facility] for worsening hypoxia/ sepsis due to covid.    #Sepsis, present on admission, due to covid  pcr positve 3/15  check inflammatory markers  requiring high flow 50 lpm, 80%  cont decadron  cont cefepime for now, check procal  f/u id  #HTN  lisinopril 5  lopressor 50 bid  #Seizure disorder  tegretol 200 bid  depakote 500  #CVA  plavix  lipitor 20  #DM2  lantus 16  humalog 4 tid  ssi  #DVT ppx  lovenox    #Progress Note Handoff:  Pending (specify):  Consults_________, Tests________, Test Results_______, Other__hypoxia_______  Family discussion:d/w pt at bedside re: treatment plan, primary dx  Disposition: Home___/SNF___/Other________/Unknown at this time____x____   89M PMHx HTN, seizure disorder, DM2, CVA with R side deficits transferred from [facility] for worsening hypoxia/ sepsis due to covid.    #Sepsis, present on admission, due to covid  pcr positve 3/15  check inflammatory markers  requiring high flow 50 lpm, 80%  cont decadron  s/p rdv  covid ab neg  cont cefepime for now, check procal  f/u id  #HTN  lisinopril 5  lopressor 50 bid  #Seizure disorder  tegretol 200 bid  depakote 500  #CVA  plavix  lipitor 20  #DM2  lantus 16  humalog 4 tid  ssi  #DVT ppx  lovenox    #Progress Note Handoff:  Pending (specify):  Consults_________, Tests________, Test Results_______, Other__hypoxia_______  Family discussion:d/w pt at bedside re: treatment plan, primary dx  Disposition: Home___/SNF___/Other________/Unknown at this time____x____

## 2021-03-21 NOTE — ED ADULT NURSE NOTE - OBJECTIVE STATEMENT
Pt presents to ED c/o SOB. [place] Pt sat 81% on 15L on arrival. Pt placed on bipap stat; sat 100%. Pt is febrile with rectal temp 101.8. Rectal tylenol given. Pt has allred from [facility]. Pt is awake but confused. Cardiac and 02 monitoring maintained.

## 2021-03-22 NOTE — PROGRESS NOTE ADULT - ASSESSMENT
89 yo M with PMHx of HTN, HLD, Epilepsy ( on VPA and carbamazepine) , CVA with R sided deficits presented with acute change in mental status per son. found to be covid positive, then transfered to [facility]    Patient transferred from [facility] for worsening hypoxemia on 3/20.   As per transfer note, at 12am vital check patient had SpO2 of 76-78% on 4L NC, oxygen increased to 25LNRB and then titrated down to 15LNRB. Kept patient on 15LNRB for 30mins. After 30mins began to slowly titrate down patient back to 5LNC, however during the process patient appeared more tachypneic and his SpO2 dropped to 89-90%.   Transferred back to [facility] for adequate management.     # Acute hypoxic respiratory failure due to COVID PNA  - Remdesivir 3/16 to 3/20  - Dexamethasone 3/16 to 3/25  - currently 6l NC, keep pulse ox >88%, wean off o2 as toelrated  - CXR shows evidence of consolidation in RLL?  - Pending Procal, CRP  - Starting empirically on cefepime, Procalcitonin : 1.78, however pt had acute pyelo prior, procal might be unreliable    # Acute pyelonephritis due to Ecoli   - s/p Ceftriaxone 3/16 to 3/19  - Vantin 3/19 to 3/22  - monitor off abx    #Altered Mental status >> likely due to metabolic encephalopathy , Doubt  stroke:   Improving.   - CT head and CTA head and neck did not show any acute stroke or occlusion.  - echo and MR head still pending for when stable from COVID-19 point of view  - On Plavix  - ct head shows Absence of flow related contrast in the left cerebral hemisphere corresponding to a large area of encephalomalacia.     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      # HTN:  -C/w lisinopril and metoprolol    # Type II DM  - On insulin scale  - Pending HbA1c

## 2021-03-22 NOTE — CHART NOTE - NSCHARTNOTEFT_GEN_A_CORE
I made rounds today with the treatment team including the hospitalist, residents,  nurses, and discussed the patient's current medical status and discharge  planning needs, and reviewed the chart.    T(C): 37.6 (03-22-21 @ 05:00), Max: 38.7 (03-21-21 @ 13:59)  HR: 120 (03-22-21 @ 05:00) (77 - 120)  BP: 139/76 (03-22-21 @ 05:00) (137/63 - 144/67)  RR: 18 (03-22-21 @ 05:00) (18 - 20)  SpO2: 94% (03-22-21 @ 08:12) (93% - 100%)          I reached out to the patient's health care proxy/ responsible family member-           [     ]  I reached                                     and discussed the patient's medical condition,                   family concerns, and discharge planning           [     ]  I left a message with family               [ x    ]  I personally participated in rounds with the medical team and my resident and discussed the case. My resident reached                   family member/ HCP    Amanda Espitia                            under my direction and supervision  and we reviewed the conversation.          [     ]  My resident left a message with family under my direction and supervision           [     ]   My resident attended medical rounds and called the family                [  x    ]    The following was discussed:  The patient's medical status over the past 24 hrs was reviewed, as well as oxygen needs and medication changes and labs. On 6 liters NC O2. On Cefapime for RLL infiltrate and elevated Procal. Insulin being down-titrated.          [   x   ]   The following concerns were raised: none          [     ] I spent 5-10 minutes on the above discussing medical issues with team members and family and/ or my resident    [   x  ] I spent 11-20 minutes on the above discussing medical issues with team members and family and/ or my resident    [     ] I spent 21-30 minutes on the above discussing medical issues with team members and family and/ or my resident

## 2021-03-22 NOTE — SWALLOW BEDSIDE ASSESSMENT ADULT - SLP PERTINENT HISTORY OF CURRENT PROBLEM
Pt is a 89 y/o M w/ PMHx: HTN, seizure disorder, DM2, CVA with R side deficits. Pt was admitted to ED w/ AMS. Pt. transferred from [facility] for worsening hypoxia/sepsis due to COVID. CXR (3/21/21) indicates increased bilateral opacities. pt is an 89 yo M with PMHx of HTN, HLD, Epilepsy , CVA with R sided deficits presented with acute change in mental status per son. pt found to be COVID+, then transferred to [facility] - now transferred back to [facility] for worsening hypoxia/sepsis due to COVID. CXR (3/21/21) indicates increased bilateral opacities.

## 2021-03-22 NOTE — PROGRESS NOTE ADULT - SUBJECTIVE AND OBJECTIVE BOX
SUBJECTIVE:    Patient is a 89y old Male who presents with a chief complaint of   Currently admitted to medicine with the primary diagnosis of COVID-19       Today is hospital day 1d. This morning he is resting comfortably in bed and reports no new issues or overnight events.     INTERVAL EVENTS:   going up on o2 6L now. 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    Diabetes mellitus    HTN (hypertension)    Seizures    CVA (cerebral vascular accident)  x 4    Acute subdural hematoma  With surgical intervention    History of total hip replacement, right    H/O hernia repair        ALLERGIES:  Ambien (Other)  Dilantin (Unknown)  phenobarbital (Unknown)    MEDICATIONS:  STANDING MEDICATIONS  atorvastatin 20 milliGRAM(s) Oral at bedtime  carBAMazepine ER Tablet 200 milliGRAM(s) Oral two times a day  cefepime   IVPB 1000 milliGRAM(s) IV Intermittent every 12 hours  clopidogrel Tablet 75 milliGRAM(s) Oral daily  dexAMETHasone  Injectable 6 milliGRAM(s) IV Push daily  dextrose 40% Gel 15 Gram(s) Oral once  dextrose 5%. 1000 milliLiter(s) IV Continuous <Continuous>  dextrose 5%. 1000 milliLiter(s) IV Continuous <Continuous>  dextrose 50% Injectable 25 Gram(s) IV Push once  dextrose 50% Injectable 12.5 Gram(s) IV Push once  dextrose 50% Injectable 25 Gram(s) IV Push once  diVALproex  milliGRAM(s) Oral daily  enoxaparin Injectable 40 milliGRAM(s) SubCutaneous daily  glucagon  Injectable 1 milliGRAM(s) IntraMuscular once  insulin glargine Injectable (LANTUS) 16 Unit(s) SubCutaneous at bedtime  insulin lispro (ADMELOG) corrective regimen sliding scale   SubCutaneous three times a day before meals  lisinopril 5 milliGRAM(s) Oral daily  metoprolol tartrate 50 milliGRAM(s) Oral two times a day    PRN MEDICATIONS  acetaminophen   Tablet .. 650 milliGRAM(s) Oral every 6 hours PRN    VITALS:   T(F): 99.6  HR: 120  BP: 139/76  RR: 18  SpO2: 94%    LABS:                        9.7    5.98  )-----------( 284      ( 22 Mar 2021 04:30 )             29.5     03-22    147<H>  |  114<H>  |  23<H>  ----------------------------<  58<L>  4.7   |  16<L>  |  0.8    Ca    7.6<L>      22 Mar 2021 04:30    TPro  5.5<L>  /  Alb  2.6<L>  /  TBili  0.5  /  DBili  x   /  AST  59<H>  /  ALT  27  /  AlkPhos  115        Urinalysis Basic - ( 21 Mar 2021 04:00 )    Color: Yellow / Appearance: Clear / S.010 / pH: x  Gluc: x / Ketone: Negative  / Bili: Negative / Urobili: <2 mg/dL   Blood: x / Protein: 30 mg/dL / Nitrite: Negative   Leuk Esterase: Negative / RBC: 3 /HPF / WBC 1 /HPF   Sq Epi: x / Non Sq Epi: 1 /HPF / Bacteria: Negative            CARDIAC MARKERS ( 21 Mar 2021 02:31 )  x     / 0.02 ng/mL / x     / x     / x          RADIOLOGY:    PHYSICAL EXAM:  GEN: No acute distress  PULM/CHEST: decreased breath sounds b/l  CVS: Regular rate and rhythm, S1-S2, no murmurs  ABD: Soft, non-tender, non-distended, +BS  EXT: No edema  NEURO: AAOx3    Stevens Catheter:   Indwelling Urethral Catheter:     Connect To:  Straight Drainage/[place]    Indication:  Urinary Retention / Obstruction (21 @ 08:56) (not performed) [Active]       SUBJECTIVE:    Patient is a 89y old Male who presents with a chief complaint of   Currently admitted to medicine with the primary diagnosis of COVID-19       Today is hospital day 1d. This morning he is resting comfortably in bed and reports no new issues or overnight events.     INTERVAL EVENTS:   going up on o2 6L now. 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    Diabetes mellitus    HTN (hypertension)    Seizures    CVA (cerebral vascular accident)  x 4    Acute subdural hematoma  With surgical intervention    History of total hip replacement, right    H/O hernia repair        ALLERGIES:  Ambien (Other)  Dilantin (Unknown)  phenobarbital (Unknown)    MEDICATIONS:  STANDING MEDICATIONS  atorvastatin 20 milliGRAM(s) Oral at bedtime  carBAMazepine ER Tablet 200 milliGRAM(s) Oral two times a day  cefepime   IVPB 1000 milliGRAM(s) IV Intermittent every 12 hours  clopidogrel Tablet 75 milliGRAM(s) Oral daily  dexAMETHasone  Injectable 6 milliGRAM(s) IV Push daily  dextrose 40% Gel 15 Gram(s) Oral once  dextrose 5%. 1000 milliLiter(s) IV Continuous <Continuous>  dextrose 5%. 1000 milliLiter(s) IV Continuous <Continuous>  dextrose 50% Injectable 25 Gram(s) IV Push once  dextrose 50% Injectable 12.5 Gram(s) IV Push once  dextrose 50% Injectable 25 Gram(s) IV Push once  diVALproex  milliGRAM(s) Oral daily  enoxaparin Injectable 40 milliGRAM(s) SubCutaneous daily  glucagon  Injectable 1 milliGRAM(s) IntraMuscular once  insulin glargine Injectable (LANTUS) 16 Unit(s) SubCutaneous at bedtime  insulin lispro (ADMELOG) corrective regimen sliding scale   SubCutaneous three times a day before meals  lisinopril 5 milliGRAM(s) Oral daily  metoprolol tartrate 50 milliGRAM(s) Oral two times a day    PRN MEDICATIONS  acetaminophen   Tablet .. 650 milliGRAM(s) Oral every 6 hours PRN    VITALS:   T(F): 99.6  HR: 120  BP: 139/76  RR: 18  SpO2: 94%    LABS:                        9.7    5.98  )-----------( 284      ( 22 Mar 2021 04:30 )             29.5     03-22    147<H>  |  114<H>  |  23<H>  ----------------------------<  58<L>  4.7   |  16<L>  |  0.8    Ca    7.6<L>      22 Mar 2021 04:30    TPro  5.5<L>  /  Alb  2.6<L>  /  TBili  0.5  /  DBili  x   /  AST  59<H>  /  ALT  27  /  AlkPhos  115        Urinalysis Basic - ( 21 Mar 2021 04:00 )    Color: Yellow / Appearance: Clear / S.010 / pH: x  Gluc: x / Ketone: Negative  / Bili: Negative / Urobili: <2 mg/dL   Blood: x / Protein: 30 mg/dL / Nitrite: Negative   Leuk Esterase: Negative / RBC: 3 /HPF / WBC 1 /HPF   Sq Epi: x / Non Sq Epi: 1 /HPF / Bacteria: Negative            CARDIAC MARKERS ( 21 Mar 2021 02:31 )  x     / 0.02 ng/mL / x     / x     / x          RADIOLOGY:    PHYSICAL EXAM:  GEN: No acute distress  PULM/CHEST: decreased breath sounds b/l  CVS: Regular rate and rhythm, S1-S2, no murmurs  ABD: Soft, non-tender, non-distended, +BS  EXT: No edema  NEURO: AAOx3    Stevens Catheter:   Indwelling Urethral Catheter:     Connect To:  Straight Drainage/[place]    Indication:  Urinary Retention / Obstruction (21 @ 08:56) (not performed) [Active]

## 2021-03-22 NOTE — PROGRESS NOTE ADULT - ATTENDING COMMENTS
#Sepsis, present on admission, due to covid  pcr positve 3/15  f/u inflammatory markers  improved to 6l nc  cont decadron  s/p rdv  covid ab neg  f/u procal, if neg, d/c abx  appreciate id

## 2021-03-23 NOTE — PROGRESS NOTE ADULT - ASSESSMENT
89 yo M with PMHx of HTN, HLD, Epilepsy ( on VPA and carbamazepine) , CVA with R sided deficits presented with acute change in mental status per son. found to be covid positive, then transfered to [facility]    Patient transferred from [facility] for worsening hypoxemia on 3/20.   As per transfer note, at 12am vital check patient had SpO2 of 76-78% on 4L NC, oxygen increased to 25LNRB and then titrated down to 15LNRB. Kept patient on 15LNRB for 30mins. After 30mins began to slowly titrate down patient back to 5LNC, however during the process patient appeared more tachypneic and his SpO2 dropped to 89-90%.   Transferred back to [facility] for adequate management.     # Acute hypoxic respiratory failure due to COVID PNA  - Remdesivir 3/16 to 3/20  - Dexamethasone 3/16 to 3/25  - currently 6l NC, keep pulse ox >88%, wean off o2 as toelrated  - Starting empirically on cefepime on admission, Procalcitonin : 0.43 3/21, monitor off abx    # Acute pyelonephritis due to Ecoli   - s/p Ceftriaxone 3/16 to 3/19  - Vantin 3/19 to 3/22  - monitor off abx    #Altered Mental status >> likely due to metabolic encephalopathy , Doubt  stroke:   Improving.   - CT head and CTA head and neck did not show any acute stroke or occlusion.  - echo and MR head still pending for when stable from COVID-19 point of view  - On Plavix  - ct head shows Absence of flow related contrast in the left cerebral hemisphere corresponding to a large area of encephalomalacia.     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      # HTN:  -C/w lisinopril and metoprolol    # Type II DM  - On insulin scale  - a1c 7.7,   -pt is hypoglycemic in the am, switch lantus to BID dosing

## 2021-03-23 NOTE — PROGRESS NOTE ADULT - SUBJECTIVE AND OBJECTIVE BOX
SUBJECTIVE:    Patient is a 89y old Male who presents with a chief complaint of   Currently admitted to medicine with the primary diagnosis of COVID-19       Today is hospital day 2d. This morning he is resting comfortably in bed and reports no new issues or overnight events.     INTERVAL EVENTS:   procal 0.43 d/c abx. hypoglycemic this am changed lantus to BID dosing with decreased dose. 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 1000 milliLiter(s) IV Continuous <Continuous>  dextrose 5%. 1000 milliLiter(s) IV Continuous <Continuous>  dextrose 50% Injectable 25 Gram(s) IV Push once  dextrose 50% Injectable 12.5 Gram(s) IV Push once  dextrose 50% Injectable 25 Gram(s) IV Push once  diVALproex  milliGRAM(s) Oral daily  enoxaparin Injectable 40 milliGRAM(s) SubCutaneous daily  glucagon  Injectable 1 milliGRAM(s) IntraMuscular once  insulin glargine Injectable (LANTUS) 7 Unit(s) SubCutaneous two times a day  insulin lispro (ADMELOG) corrective regimen sliding scale   SubCutaneous three times a day before meals  lisinopril 5 milliGRAM(s) Oral daily  metoprolol tartrate 50 milliGRAM(s) Oral two times a day    PRN MEDICATIONS  acetaminophen   Tablet .. 650 milliGRAM(s) Oral every 6 hours PRN    VITALS:   T(F): 98.8  HR: 100  BP: 160/68  RR: 16  SpO2: 95%    LABS:                        9.7    5.98  )-----------( 284      ( 22 Mar 2021 04:30 )             29.5     03-22    147<H>  |  114<H>  |  23<H>  ----------------------------<  58<L>  4.7   |  16<L>  |  0.8    Ca    7.6<L>      22 Mar 2021 04:30    TPro  5.5<L>  /  Alb  2.6<L>  /  TBili  0.5  /  DBili  x   /  AST  59<H>  /  ALT  27  /  AlkPhos  115  03-22              Culture - Urine (collected 21 Mar 2021 04:00)  Source: .Urine Clean Catch (Midstream)  Final Report (22 Mar 2021 19:02):    No growth    Culture - Blood (collected 21 Mar 2021 02:41)  Source: .Blood Blood  Preliminary Report (22 Mar 2021 13:01):    No growth to date.    Culture - Blood (collected 21 Mar 2021 02:41)  Source: .Blood Blood  Gram Stain (22 Mar 2021 13:03):    Growth in aerobic bottle: Gram Positive Cocci in Clusters  Preliminary Report (22 Mar 2021 13:03):    Growth in aerobic bottle: Gram Positive Cocci in Clusters    ***Blood Panel PCR results on this specimen are available    approximately 3 hours after the Gram stain result.***    Gram stain, PCR, and/or culture results may not always    correspond due to difference in methodologies.    ************************************************************    This PCR assay was performed by multiplex PCR. This    Assay tests for 66 bacterial and resistance gene targets.    Please refer to the [facility] test directory    at [email or web address] for details.  Organism: Blood Culture PCR (22 Mar 2021 15:05)  Organism: Blood Culture PCR (22 Mar 2021 15:05)          RADIOLOGY:    PHYSICAL EXAM:  GEN: No acute distress  PULM/CHEST: bilateral decreased breath sounds  CVS: Regular rate and rhythm, S1-S2, no murmurs  ABD: Soft, non-tender, non-distended, +BS  EXT: No edema  NEURO: AAOx2    Stevens Catheter:   Indwelling Urethral Catheter:     Connect To:  Straight Drainage/[place]    Indication:  Urinary Retention / Obstruction (03-23-21 @ 08:00) (not performed) [Active]  Indwelling Urethral Catheter:     Connect To:  Straight Drainage/[place]    Indication:  Urinary Retention / Obstruction (03-22-21 @ 08:56) (not performed) [Active]       SUBJECTIVE:    Patient is a 89y old Male who presents with a chief complaint of   Currently admitted to medicine with the primary diagnosis of COVID-19       Today is hospital day 2d. This morning he is resting comfortably in bed and reports no new issues or overnight events.     INTERVAL EVENTS:   procal 0.43 d/c abx. hypoglycemic this am changed lantus to BID dosing with decreased dose. 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 1000 milliLiter(s) IV Continuous <Continuous>  dextrose 5%. 1000 milliLiter(s) IV Continuous <Continuous>  dextrose 50% Injectable 25 Gram(s) IV Push once  dextrose 50% Injectable 12.5 Gram(s) IV Push once  dextrose 50% Injectable 25 Gram(s) IV Push once  diVALproex  milliGRAM(s) Oral daily  enoxaparin Injectable 40 milliGRAM(s) SubCutaneous daily  glucagon  Injectable 1 milliGRAM(s) IntraMuscular once  insulin glargine Injectable (LANTUS) 7 Unit(s) SubCutaneous two times a day  insulin lispro (ADMELOG) corrective regimen sliding scale   SubCutaneous three times a day before meals  lisinopril 5 milliGRAM(s) Oral daily  metoprolol tartrate 50 milliGRAM(s) Oral two times a day    PRN MEDICATIONS  acetaminophen   Tablet .. 650 milliGRAM(s) Oral every 6 hours PRN    VITALS:   T(F): 98.8  HR: 100  BP: 160/68  RR: 16  SpO2: 95%    LABS:                        9.7    5.98  )-----------( 284      ( 22 Mar 2021 04:30 )             29.5     03-22    147<H>  |  114<H>  |  23<H>  ----------------------------<  58<L>  4.7   |  16<L>  |  0.8    Ca    7.6<L>      22 Mar 2021 04:30    TPro  5.5<L>  /  Alb  2.6<L>  /  TBili  0.5  /  DBili  x   /  AST  59<H>  /  ALT  27  /  AlkPhos  115  03-22              Culture - Urine (collected 21 Mar 2021 04:00)  Source: .Urine Clean Catch (Midstream)  Final Report (22 Mar 2021 19:02):    No growth    Culture - Blood (collected 21 Mar 2021 02:41)  Source: .Blood Blood  Preliminary Report (22 Mar 2021 13:01):    No growth to date.    Culture - Blood (collected 21 Mar 2021 02:41)  Source: .Blood Blood  Gram Stain (22 Mar 2021 13:03):    Growth in aerobic bottle: Gram Positive Cocci in Clusters  Preliminary Report (22 Mar 2021 13:03):    Growth in aerobic bottle: Gram Positive Cocci in Clusters    ***Blood Panel PCR results on this specimen are available    approximately 3 hours after the Gram stain result.***    Gram stain, PCR, and/or culture results may not always    correspond due to difference in methodologies.    ************************************************************    This PCR assay was performed by multiplex PCR. This    Assay tests for 66 bacterial and resistance gene targets.    Please refer to the [facility] test directory    at [email or web address] for details.  Organism: Blood Culture PCR (22 Mar 2021 15:05)  Organism: Blood Culture PCR (22 Mar 2021 15:05)          RADIOLOGY:    PHYSICAL EXAM:  GEN: No acute distress  PULM/CHEST: bilateral decreased breath sounds  CVS: Regular rate and rhythm, S1-S2, no murmurs  ABD: Soft, non-tender, non-distended, +BS  EXT: No edema  NEURO: AAOx2    Stevens Catheter:   Indwelling Urethral Catheter:     Connect To:  Straight Drainage/[place]    Indication:  Urinary Retention / Obstruction (03-23-21 @ 08:00) (not performed) [Active]  Indwelling Urethral Catheter:     Connect To:  Straight Drainage/[place]    Indication:  Urinary Retention / Obstruction (03-22-21 @ 08:56) (not performed) [Active]

## 2021-03-23 NOTE — CHART NOTE - NSCHARTNOTEFT_GEN_A_CORE
I made rounds today with the treatment team including the hospitalist, residents,  nurses, and discussed the patient's current medical status and discharge  planning needs, and reviewed the chart.    T(C): 37.1 (03-23-21 @ 05:00), Max: 37.2 (03-22-21 @ 21:00)  HR: 100 (03-23-21 @ 05:00) (67 - 100)  BP: 160/68 (03-23-21 @ 05:00) (124/72 - 160/68)  RR: 16 (03-23-21 @ 05:00) (16 - 18)  SpO2: 95% (03-23-21 @ 08:05) (91% - 95%)          I reached out to the patient's health care proxy/ responsible family member-           [     ]  I reached                                     and discussed the patient's medical condition,                   family concerns, and discharge planning           [     ]  I left a message with family               [  x   ]  I personally participated in rounds with the medical team and my resident and discussed the case. My resident reached                   family member/ HCP     son, Davy Espitia, [phone]                           under my direction and supervision  and we reviewed the conversation.          [     ]  My resident left a message with family under my direction and supervision           [     ]   My resident attended medical rounds and called the family                [  x    ]    The following was discussed:  The patient's medical status over the past 24 hrs was reviewed, as well as oxygen needs and medication changes and labs. On Decadron. O2 decreased to 4 liters. Antibiotics stopped. Nurse reports that he is coughing when eating, despite being on a dysphagia diet. Speech therapy to reeval.          [    x  ]   The following concerns were raised: none          [     ] I spent 5-10 minutes on the above discussing medical issues with team members and family and/ or my resident    [    x ] I spent 11-20 minutes on the above discussing medical issues with team members and family and/ or my resident    [     ] I spent 21-30 minutes on the above discussing medical issues with team members and family and/ or my resident

## 2021-03-23 NOTE — PROGRESS NOTE ADULT - ATTENDING COMMENTS
#Sepsis, present on admission, due to covid  pcr positve 3/15  f/u inflammatory markers  improved to 6l nc  cont decadron  s/p rdv  covid ab neg  monitor off abx  appreciate id     #Progress Note Handoff:  Pending (specify):  Consults_________, Tests________, Test Results_______, Other___f/u id______  Family discussion: d/w pt at bedside re: treatment plan, primary dx  Disposition: Home___/SNF___/Other________/Unknown at this time_____x___

## 2021-03-24 NOTE — PROGRESS NOTE ADULT - ASSESSMENT
88 yo M with PMHx of HTN, HLD, Epilepsy ( on VPA and carbamazepine) , CVA with R sided deficits presented with acute change in mental status per son. found to be covid positive, then transfered to [facility]    Patient transferred from [facility] for worsening hypoxemia on 3/20.   As per transfer note, at 12am vital check patient had SpO2 of 76-78% on 4L NC, oxygen increased to 25LNRB and then titrated down to 15LNRB. Kept patient on 15LNRB for 30mins. After 30mins began to slowly titrate down patient back to 5LNC, however during the process patient appeared more tachypneic and his SpO2 dropped to 89-90%.   Transferred back to [facility] for adequate management.     # Acute hypoxic respiratory failure due to COVID PNA  - Remdesivir 3/16 to 3/20  - Dexamethasone 3/16 to 3/25  - currently 6l NC, keep pulse ox >88%, wean off o2 as toelrated  - Starting empirically on cefepime on admission, Procalcitonin : 0.43 3/21, abx d/c'd monitor off abx  - dispo planning, pt likely needs home o2, waiting for epifanio HHA reinstallment    # Acute pyelonephritis due to Ecoli   - s/p Ceftriaxone 3/16 to 3/19  - Vantin 3/19 to 3/22  - monitor off abx    #Altered Mental status >> likely due to metabolic encephalopathy , Doubt  stroke:   Improving.   - CT head and CTA head and neck did not show any acute stroke or occlusion.  - echo and MR head still pending for when stable from COVID-19 point of view  - On Plavix  - ct head shows Absence of flow related contrast in the left cerebral hemisphere corresponding to a large area of encephalomalacia.     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      # HTN:  -C/w lisinopril and metoprolol    # Type II DM  - On insulin scale  - a1c 7.7,   -pt is hypoglycemic in the am, switch lantus to BID dosing

## 2021-03-24 NOTE — PROGRESS NOTE ADULT - ATTENDING COMMENTS
88 yo M with PMHx of HTN, HLD, Epilepsy ( on VPA and carbamazepine) , CVA with R sided deficits presented with acute change in mental status per son. found to be covid positive, then transferred to [facility]    Patient transferred from [facility] for worsening hypoxemia on 3/20.   As per transfer note, at 12am vital check patient had SpO2 of 76-78% on 4L NC, oxygen increased to 25LNRB and then titrated down to 15LNRB. Kept patient on 15LNRB for 30mins. After 30mins began to slowly titrate down patient back to 5LNC, however during the process patient appeared more tachypneic and his SpO2 dropped to 89-90%.   Transferred back to [facility] ([place] for adequate management.     # Acute hypoxic respiratory failure due to COVID PNA  - Remdesivir 3/16 to 3/20  - Dexamethasone 3/16 to 3/25  - S/p empiric cefepime (3/21-23; Procalcitonin : 0.43 3/21). abx d/c'd monitor off abx  - dispo planning: Eventually pt likely needs home o2, waiting for epifanio HHA reinstallment  -D-dimer 5000 > LE Duplex neg.    3/24: 6L NC. comfortable breathing. Sleepy but arousable. Likely element of Covid encephalopathy. [place] frequently. Get OOB to chair tomorrow and PT soon.     # Acute pyelonephritis due to Ecoli   - s/p Ceftriaxone 3/16 to 3/19  - Vantin 3/19 to 3/22  - monitor off abx    #Altered Mental status >> likely due to metabolic encephalopathy , Doubt  stroke:   Improving.   - CT head and CTA head and neck did not show any acute stroke or occlusion.  - echo and MR head still pending for when stable from COVID-19 point of view  - On Plavix  - ct head shows Absence of flow related contrast in the left cerebral hemisphere corresponding to a large area of encephalomalacia.     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      # HTN:  -C/w lisinopril and metoprolol    # Type II DM  - On insulin scale  - a1c 7.7,   -pt is hypoglycemic in the am, switch lantus to 5HS and observe. 88 yo M with PMHx of HTN, HLD, Epilepsy ( on VPA and carbamazepine) , CVA with R sided deficits presented with acute change in mental status per son. found to be covid positive, then transferred to [facility]    Patient transferred from [facility] for worsening hypoxemia on 3/20.   As per transfer note, at 12am vital check patient had SpO2 of 76-78% on 4L NC, oxygen increased to 25LNRB and then titrated down to 15LNRB. Kept patient on 15LNRB for 30mins. After 30mins began to slowly titrate down patient back to 5LNC, however during the process patient appeared more tachypneic and his SpO2 dropped to 89-90%.   Transferred back to [facility] for adequate management.     # Acute hypoxic respiratory failure due to COVID PNA  - Remdesivir 3/16 to 3/20  - Dexamethasone 3/16 to 3/25  - S/p empiric cefepime (3/21-23; Procalcitonin : 0.43 3/21). abx d/c'd monitor off abx  - dispo planning: Eventually pt likely needs home o2, waiting for epifanio HHA reinstallment  -D-dimer 5000 > LE Duplex neg.    3/24: 6L NC. comfortable breathing. Sleepy but arousable. Likely element of Covid encephalopathy. [place] frequently. Get OOB to chair tomorrow and PT soon.     # Acute pyelonephritis due to Ecoli   - s/p Ceftriaxone 3/16 to 3/19  - Vantin 3/19 to 3/22  - monitor off abx    #Altered Mental status >> likely due to metabolic encephalopathy , Doubt  stroke:   Improving.   - CT head and CTA head and neck did not show any acute stroke or occlusion.  - echo and MR head still pending for when stable from COVID-19 point of view  - On Plavix  - ct head shows Absence of flow related contrast in the left cerebral hemisphere corresponding to a large area of encephalomalacia.     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      # HTN:  -C/w lisinopril and metoprolol    # Type II DM  - On insulin scale  - a1c 7.7,   -pt is hypoglycemic in the am, switch lantus to 4 BID + SSI and observe.

## 2021-03-24 NOTE — PROGRESS NOTE ADULT - SUBJECTIVE AND OBJECTIVE BOX
SUBJECTIVE:    Patient is a 89y old Male who presents with a chief complaint of   Currently admitted to medicine with the primary diagnosis of COVID-19       Today is hospital day 3d. This morning he is resting comfortably in bed and reports no new issues or overnight events.     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 1000 milliLiter(s) IV Continuous <Continuous>  dextrose 5%. 1000 milliLiter(s) IV Continuous <Continuous>  dextrose 50% Injectable 25 Gram(s) IV Push once  dextrose 50% Injectable 12.5 Gram(s) IV Push once  dextrose 50% Injectable 25 Gram(s) IV Push once  diVALproex  milliGRAM(s) Oral daily  enoxaparin Injectable 40 milliGRAM(s) SubCutaneous daily  glucagon  Injectable 1 milliGRAM(s) IntraMuscular once  insulin glargine Injectable (LANTUS) 5 Unit(s) SubCutaneous at bedtime  insulin lispro (ADMELOG) corrective regimen sliding scale   SubCutaneous three times a day before meals  lisinopril 5 milliGRAM(s) Oral daily  metoprolol tartrate 50 milliGRAM(s) Oral two times a day    PRN MEDICATIONS  acetaminophen   Tablet .. 650 milliGRAM(s) Oral every 6 hours PRN    VITALS:   T(F): 97.1  HR: 90  BP: 154/60  RR: 18  SpO2: 91%    LABS:                        8.7    10.14 )-----------( 248      ( 24 Mar 2021 06:21 )             26.9     03-23    142  |  109  |  24<H>  ----------------------------<  91  4.2   |  21  |  0.8    Ca    7.7<L>      23 Mar 2021 12:05  Mg     2.0     03-23    TPro  5.5<L>  /  Alb  2.6<L>  /  TBili  0.6  /  DBili  x   /  AST  82<H>  /  ALT  36  /  AlkPhos  149<H>  03-23                  RADIOLOGY:    PHYSICAL EXAM:  GEN: No acute distress  PULM/CHEST: decreased breath sounds bilateral  CVS: Regular rate and rhythm, S1-S2, no murmurs  ABD: Soft, non-tender, non-distended, +BS  EXT: No edema  NEURO: AAOx3    Stevens Catheter:   Indwelling Urethral Catheter:     Connect To:  Straight Drainage/[facility]    Indication:  Urinary Retention / Obstruction (03-23-21 @ 08:00) (not performed) [Active]  Indwelling Urethral Catheter:     Connect To:  Straight Drainage/[facility]    Indication:  Urinary Retention / Obstruction (03-22-21 @ 08:56) (not performed) [Active]

## 2021-03-24 NOTE — CHART NOTE - NSCHARTNOTEFT_GEN_A_CORE
I made rounds today with the treatment team including the hospitalist, residents,  nurses, and discussed the patient's current medical status and discharge  planning needs, and reviewed the chart.    T(C): 36.7 (03-24-21 @ 12:13), Max: 37.8 (03-23-21 @ 23:00)  HR: 86 (03-24-21 @ 12:13) (86 - 91)  BP: 149/71 (03-24-21 @ 12:13) (149/66 - 154/60)  RR: 19 (03-24-21 @ 12:13) (18 - 19)  SpO2: 91% (03-24-21 @ 12:13) (91% - 95%)          I reached out to the patient's health care proxy/ responsible family member-           [     ]  I reached                                     and discussed the patient's medical condition,                   family concerns, and discharge planning           [     ]  I left a message with family               [  x   ]  I personally participated in rounds with the medical team and my resident and discussed the case. My resident reached                   family member/ HCP    his son Davy                             under my direction and supervision  and we reviewed the conversation.          [     ]  My resident left a message with family under my direction and supervision           [     ]   My resident attended medical rounds and called the family                [   x   ]    The following was discussed:  The patient's medical status over the past 24 hrs was reviewed, as well as oxygen needs and medication changes and labs. On Decadron. On 6 liters O2. On mech soft diet with nectar-thick liquid. Adjusting insulin.         [  x    ]   The following concerns were raised: none          [     ] I spent 5-10 minutes on the above discussing medical issues with team members and family and/ or my resident    [   x  ] I spent 11-20 minutes on the above discussing medical issues with team members and family and/ or my resident    [     ] I spent 21-30 minutes on the above discussing medical issues with team members and family and/ or my resident

## 2021-03-24 NOTE — CHART NOTE - NSCHARTNOTEFT_GEN_A_CORE
Rapid response called for finger stick of 11. Vital were WNL. Patient asymptomatic. received Dextrose 50. Subsequent fingersticks came up to 146 Rapid response called for finger stick of 11. Vital were WNL. Patient asymptomatic. received Dextrose 50. Subsequent fingersticks came up to 146      Addendum:  Rapid response was called for a BG level of 11 mg/dL.  Patient seen and evaluated.    S:   Pt denied any concerns/complaints; apparently lethargic today per nursing staff    O:   Hemodynamics stable; +tachycardic; +tachypneic; +rales (promient at perihilum); RRR; BS+; abd soft  BG 11; H&H 8.7/26.9, d-dimer 5,119, AG 15, bicarb 18,   Imaging: opacities b/l (more prominent at rt prisca-hilum)  EKG: sinus tachycardia w/ non-specific ST-T changes    A:   Neuroglycopenia due to poor PO intake 2/2 burden of illness; metabolic encephalopathy  Respiratory insufficiency w/ hypoxia; tachycardia 2/2 ongoing acute SARS-CoV-2 syndrome    P:  Insulin regimen adjusted; D50 x 2; D5 drip for 2 hrs; close clinical monitoring    Case and plan of care discussed with Nursing Staff

## 2021-03-25 NOTE — PROGRESS NOTE ADULT - ASSESSMENT
88 yo M with PMHx of HTN, HLD, Epilepsy ( on VPA and carbamazepine) , CVA with R sided deficits presented with acute change in mental status per son. found to be covid positive, then transfered to [facility]    Patient transferred from [facility] for worsening hypoxemia on 3/20.   As per transfer note, at 12am vital check patient had SpO2 of 76-78% on 4L NC, oxygen increased to 25LNRB and then titrated down to 15LNRB. Kept patient on 15LNRB for 30mins. After 30mins began to slowly titrate down patient back to 5LNC, however during the process patient appeared more tachypneic and his SpO2 dropped to 89-90%.   Transferred back to [facility] for adequate management.     # Acute hypoxic respiratory failure due to COVID PNA  - Remdesivir 3/16 to 3/20  - Dexamethasone 3/16 to 3/25  - currently 6l NC, keep pulse ox >88%, wean off o2 as toelrated  - Starting empirically on cefepime on admission, Procalcitonin : 0.43 3/21, abx d/c'd monitor off abx  - dispo planning, pt likely needs home o2, waiting for epifanio HHA reinstallment    # Acute pyelonephritis due to Ecoli   - s/p Ceftriaxone 3/16 to 3/19  - Vantin 3/19 to 3/22  - monitor off abx    #Altered Mental status >> likely due to metabolic encephalopathy , Doubt  stroke:   Improving.   - CT head and CTA head and neck did not show any acute stroke or occlusion.  - echo and MR head still pending for when stable from COVID-19 point of view  - On Plavix  - ct head shows Absence of flow related contrast in the left cerebral hemisphere corresponding to a large area of encephalomalacia.     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      # HTN:  -C/w lisinopril and metoprolol    # Type II DM  - On insulin lantus and lispo  - a1c 7.7,   -pt is hypoglycemic in the am and pm, hyperglycemic during the day, endocrine consult today for fs management

## 2021-03-25 NOTE — SWALLOW BEDSIDE ASSESSMENT ADULT - SLP PERTINENT HISTORY OF CURRENT PROBLEM
89 y/o M initially presented with AMS, found to be COVID+, and transferred to [facility] east. Pt now transferred back to [facility] [place] for worsening hypoxia/sepsis 2' COVID PNA. CXR (3/21/21) indicates increased bilateral opacities. PMHx of HTN, HLD, Epilepsy , CVA with R sided deficits. Rapid response 3/24 2' hypoglycemia, now fluctuating hypo/hyperglycemia- endocrinology c/s. Pt now w/ worsening mental status, likely 2' metabolic encephalopathy. CTH (-) for acute pathology, extensive left hemispheric encephalomalacia, table moderate parenchymal volume loss. Stable mild chronic microvascular ischemic changes involving the right cerebral hemisphere. Echo and MRI pending when stable.

## 2021-03-25 NOTE — SWALLOW BEDSIDE ASSESSMENT ADULT - NS ASR SWALLOW FINDINGS DISCUS
Subjective   Patient ID: Stephan is a 61 year old male.    Chief Complaint   Patient presents with   • Annual Exam     He is here for check up and physical;he is not very compliant with his care. Has severe  Asthma; triggers are weather changes and possibly allergies. Treated for Asthma flare up in January; completed Prednisone. Ran out of ProAir;  Taking Advair daily.  Continue to report shortness of breath and wheezing on daily basis.  He has no significant nasal congestion and no watering in the eyes.  His wife also reports severe snoring and apnea symptoms at night.  He wakes up tired.  He has never been tested for sleep apnea in the past.  He has long standing anemia due to GI causes; found to have [place] lesion.  Last appointment with GI was in 2016 at which time he was referred to colorectal surgeon.  He had villous adenoma completely resected in 2014.  He denies abdominal complaints.  Has been taking iron on and off.  Denies blood in stool. He was treated with iron infusions in the past; taking OTC iron supplements.      Patient's medications, allergies, past medical, surgical, social and family histories were reviewed and updated as appropriate.    Review of Systems   Constitutional: Negative for chills and fever.   HENT: Negative for ear pain, sinus pain and sore throat.    Eyes: Negative for pain.   Respiratory: Positive for chest tightness, shortness of breath and wheezing.    Cardiovascular: Negative for chest pain.   Gastrointestinal: Negative for abdominal pain.   Endocrine: Negative for polydipsia and polyuria.   Genitourinary: Negative for difficulty urinating.   Musculoskeletal: Negative for arthralgias.   Skin: Negative for rash.   Allergic/Immunologic: Negative for food allergies.   Neurological: Negative for dizziness and headaches.   Hematological: Does not bruise/bleed easily.   Psychiatric/Behavioral: Negative for behavioral problems.       Objective      Physical Exam  Vitals signs and nursing  note reviewed.   Constitutional:       Appearance: He is well-developed.   HENT:      Head: Normocephalic and atraumatic.      Right Ear: External ear normal.      Left Ear: External ear normal.      Nose: Nose normal.   Eyes:      Conjunctiva/sclera: Conjunctivae normal.      Pupils: Pupils are equal, round, and reactive to light.   Neck:      Musculoskeletal: Normal range of motion and neck supple.   Cardiovascular:      Rate and Rhythm: Normal rate and regular rhythm.      Heart sounds: Normal heart sounds.   Pulmonary:      Breath sounds: Normal breath sounds.      Comments: Decreased expiratory effort, bilateral wheezing is present.  Abdominal:      General: Bowel sounds are normal.      Palpations: Abdomen is soft.   Musculoskeletal: Normal range of motion.   Skin:     General: Skin is warm.   Neurological:      Mental Status: He is alert and oriented to person, place, and time.         Assessment   Problem List Items Addressed This Visit     None      Visit Diagnoses     Screening for colorectal cancer    -  Primary    Relevant Orders    OCCULT BLOOD TEST TUBE    Chronic fatigue        Relevant Orders    ELECTROCARDIOGRAM 12-LEAD    COMPREHENSIVE METABOLIC PANEL    Other sleep apnea        Relevant Orders    SERVICE TO SLEEP MEDICINE    Severe persistent asthma, unspecified whether complicated        Relevant Medications    fluticasone-umeclidinium-vilanterol (TRELEGY ELLIPTA) 100-62.5-25 MCG/INH inhaler    Respiratory Therapy Supplies (NEBULIZER COMPRESSOR) Kit    albuterol (VENTOLIN) (2.5 MG/3ML) 0.083% nebulizer solution    montelukast (SINGULAIR) 10 MG tablet    albuterol (PROAIR HFA) 108 (90 Base) MCG/ACT inhaler    Other Relevant Orders    SERVICE TO PULMONARY TESTING    Anemia due to GI blood loss        Relevant Orders    CBC WITH DIFFERENTIAL    IRON AND TOTAL IRON BINDING CAPACITY    FERRITIN    Mixed dyslipidemia        Relevant Orders    LIPID PANEL WITH REFLEX    THYROID STIMULATING HORMONE     KULWINDER Sotelo, Dr. Newberry/Physician/Nursing Screening for prostate cancer        Relevant Orders    PSA    URINALYSIS & REFLEX MICROSCOPIC    Occult GI bleeding        Need for immunization against influenza        Relevant Orders    INFLUENZA QUADRIVALENT SPLIT PRES FREE 0.5 ML VACC, IM (FLULAVAL,FLUARIX,FLUZONE) (Completed)      Severe persistent asthma; suboptimally controlled.  He is to schedule spirometry.  We will add nebulized treatments and change inhaler to Trelegy at this time.  May continue pro-air as needed.  He has symptoms suggestive of CARLOS; he is to see sleep MD for further evaluation.  Anemia due to GI losses; await labs as per orders.  History of villous adenoma and [place] lesion; he is to follow-up with colorectal surgeon for surveillance colonoscopy/EGD especially if persistent anemia.  Will check occult stool test.  Age-appropriate screenings have been discussed with patient and appropriate referrals have been provided.    Health Maintenance Summary     DTaP/Tdap/Td Vaccine (1 - Tdap)  Overdue since 7/10/1977    Colorectal Cancer Screening-Colonoscopy (Every 10 Years)  Overdue since 7/10/2008    Shingles Vaccine (1 of 2)  Overdue since 7/10/2008    Hepatitis C Screening (Once)  Overdue since 7/10/2009    Influenza Vaccine (1)  Order placed this encounter    Depression Screening (Yearly)  Next due on 2/17/2021    Pneumococcal Vaccine 0-64   Aged Out    Meningococcal Vaccine   Aged Out          Schedule follow up: in 3 months

## 2021-03-25 NOTE — CONSULT NOTE ADULT - SUBJECTIVE AND OBJECTIVE BOX
Neurology Consult    Patient is a 89y old  Male who presents with a chief complaint of COVID pneumonia (25 Mar 2021 11:08)      HPI:  89 yo M with PMHx of HTN, HLD, Epilepsy ( on VPA and carbamazepine) , CVA with R sided deficits presented with acute change in mental status per son. Found to be covid positive, then transfered to [facility]. Patient transferred from [facility] for worsening hypoxemia on 3/20. Transferred back to [facility] for adequate management.   Treated for acute hypoxic respiratory failure due to COVID PNA.  Hospital course complicated by acute pyelonephritis due to Ecoli. CT head and CTA head and neck did not show any acute stroke or occlusion. Neuro consult called for altered mental status.    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    Diabetes mellitus    HTN (hypertension)    Seizures    CVA (cerebral vascular accident)  x 4    Acute subdural hematoma  With surgical intervention    History of total hip replacement, right    H/O hernia repair        FAMILY HISTORY:      Social History: (-) x 3    Allergies    Ambien (Other)  Dilantin (Unknown)  phenobarbital (Unknown)    Intolerances        MEDICATIONS  (STANDING):  atorvastatin 20 milliGRAM(s) Oral at bedtime  carBAMazepine ER Tablet 200 milliGRAM(s) Oral two times a day  clopidogrel Tablet 75 milliGRAM(s) Oral daily  dexAMETHasone  Injectable 6 milliGRAM(s) IV Push daily  dextrose 40% Gel 15 Gram(s) Oral once  dextrose 5%. 1000 milliLiter(s) (50 mL/Hr) IV Continuous <Continuous>  dextrose 5%. 1000 milliLiter(s) (100 mL/Hr) IV Continuous <Continuous>  dextrose 50% Injectable 50 milliLiter(s) IV Push once  dextrose 50% Injectable 25 Gram(s) IV Push once  dextrose 50% Injectable 12.5 Gram(s) IV Push once  dextrose 50% Injectable 25 Gram(s) IV Push once  diVALproex  milliGRAM(s) Oral daily  enoxaparin Injectable 40 milliGRAM(s) SubCutaneous daily  glucagon  Injectable 1 milliGRAM(s) IntraMuscular once  insulin glargine Injectable (LANTUS) 9 Unit(s) SubCutaneous every morning  insulin lispro (ADMELOG) corrective regimen sliding scale   SubCutaneous three times a day before meals  lisinopril 5 milliGRAM(s) Oral daily  metoprolol tartrate 50 milliGRAM(s) Oral two times a day    MEDICATIONS  (PRN):  acetaminophen   Tablet .. 650 milliGRAM(s) Oral every 6 hours PRN Temp greater or equal to 38C (100.4F), Mild Pain (1 - 3)    Vital Signs Last 24 Hrs  T(C): 35.7 (25 Mar 2021 14:29), Max: 38 (25 Mar 2021 05:00)  T(F): 96.3 (25 Mar 2021 14:29), Max: 100.4 (25 Mar 2021 05:00)  HR: 65 (25 Mar 2021 14:29) (65 - 122)  BP: 128/58 (25 Mar 2021 14:29) (110/54 - 137/59)  BP(mean): --  RR: 18 (25 Mar 2021 16:21) (18 - 18)  SpO2: 94% (25 Mar 2021 16:21) (92% - 94%)    Examination:  General:  Appearance is consistent with chronologic age.  No abnormal facies.  Gross skin survey within normal limits.    Cognitive/Language:  The patient is oriented to self not place or time. Patient awake spontaneously. Speaks sentences at times and nondysarthric. Mildly hypophonic.     Eyes: limited exam due to mental status.  Face:  no facial asymmetry.    Formal Muscle Strength Testing: mental status limiting exam. 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  Sensory examination: limited by ams but withdraws bilateral LE to peripheral noxious stimuli and LUE>RUE  Cerebellum:  limited by ams    Labs:   CBC Full  -  ( 24 Mar 2021 06:21 )  WBC Count : 10.14 K/uL  RBC Count : 2.71 M/uL  Hemoglobin : 8.7 g/dL  Hematocrit : 26.9 %  Platelet Count - Automated : 248 K/uL  Mean Cell Volume : 99.3 fL  Mean Cell Hemoglobin : 32.1 pg  Mean Cell Hemoglobin Concentration : 32.3 g/dL  Auto Neutrophil # : 9.45 K/uL  Auto Lymphocyte # : 0.49 K/uL  Auto Monocyte # : 0.09 K/uL  Auto Eosinophil # : 0.01 K/uL  Auto Basophil # : 0.02 K/uL  Auto Neutrophil % : 93.2 %  Auto Lymphocyte % : 4.8 %  Auto Monocyte % : 0.9 %  Auto Eosinophil % : 0.1 %  Auto Basophil % : 0.2 %    03-24    145  |  112<H>  |  24<H>  ----------------------------<  266<H>  4.6   |  18  |  1.0    Ca    7.5<L>      24 Mar 2021 06:21  Mg     1.9     03-24    TPro  5.2<L>  /  Alb  2.4<L>  /  TBili  0.6  /  DBili  x   /  AST  49<H>  /  ALT  28  /  AlkPhos  131<H>  03-24    LIVER FUNCTIONS - ( 24 Mar 2021 06:21 )  Alb: 2.4 g/dL / Pro: 5.2 g/dL / ALK PHOS: 131 U/L / ALT: 28 U/L / AST: 49 U/L / GGT: x                   Neuroimaging:  NCHCT:   < from: CT Brain Stroke Protocol (03.15.21 @ 21:00) >    IMPRESSION:    No evidence of acute intracranial hemorrhage or acute territorial infarct. Stable exam since February 22, 2016.    < end of copied text >  < from: CT Brain Stroke Protocol (03.15.21 @ 21:00) >        03-25-21 @ 20:12  < from: CT Angio Neck w/ IV Cont (03.15.21 @ 23:43) >  IMPRESSION:    Absence of flow related contrast in the left cerebral hemisphere corresponding to a large area of encephalomalacia.        `C-Reactive Protein, Serum: 301: Please note: Reference range has changed due to units of measure change. mg/L (03.24.21 @ 06:21) D-Dimer Assay, Quantitative: 5119: Manufacturers recommended Cut off for VTE is 230 ng/ml D-DU ng/mL DDU (03.24.21 @ 06:21)

## 2021-03-25 NOTE — CONSULT NOTE ADULT - ATTENDING COMMENTS
Patient seen and examined and history, notes, labs, imaging, vitals and meds reviewed personally.  Patient has severe left hemispheric volume loss and baseline disability high.  Referred initially for decreased level of arousal and PO intake.  Has history of seizures and mental status is poor.  Unlikely covid encephalopathy and more likely toxic/metabolic encephalopathy however seizures are also likely    Plan as above Patient seen and examined and history, notes, labs, imaging, vitals and meds reviewed personally.  Patient has severe left hemispheric volume loss and baseline disability due too previous injury.  Referred initially for decreased level of arousal and PO intake.  Has history of seizures and mental status is poor.  Unlikely covid encephalopathy and more likely toxic/metabolic encephalopathy however seizures are also likely    Plan as above

## 2021-03-25 NOTE — CONSULT NOTE ADULT - SUBJECTIVE AND OBJECTIVE BOX
ENDOCRINOLOGY CONSULTATION NOTE      89 yo M with PMHx of HTN, HLD, Epilepsy ( on VPA and carbamazepine) , CVA with R sided deficits, DMII on Januvia presented with acute change in mental status per son. found to be covid positive, then transfered to [facility]. Transferred back to [facility] for adequate management.   Patient has been on Dexamethasone 6 mg IV since admission ( ) and on SC insulin. 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    Diabetes mellitus    HTN (hypertension)    Seizures    CVA (cerebral vascular accident)  x 4    Acute subdural hematoma  With surgical intervention    History of total hip replacement, right    H/O hernia repair      Allergies:  Ambien (Other)  Dilantin (Unknown)  phenobarbital (Unknown)    Home Medications Reviewed  Hospital Medications:   MEDICATIONS  (STANDING):  atorvastatin 20 milliGRAM(s) Oral at bedtime  carBAMazepine ER Tablet 200 milliGRAM(s) Oral two times a day  clopidogrel Tablet 75 milliGRAM(s) Oral daily  dexAMETHasone  Injectable 6 milliGRAM(s) IV Push daily  dextrose 40% Gel 15 Gram(s) Oral once  dextrose 5%. 1000 milliLiter(s) (50 mL/Hr) IV Continuous <Continuous>  dextrose 5%. 1000 milliLiter(s) (100 mL/Hr) IV Continuous <Continuous>  dextrose 5%. 1000 milliLiter(s) (75 mL/Hr) IV Continuous <Continuous>  dextrose 50% Injectable 50 milliLiter(s) IV Push once  dextrose 50% Injectable 25 Gram(s) IV Push once  dextrose 50% Injectable 12.5 Gram(s) IV Push once  dextrose 50% Injectable 25 Gram(s) IV Push once  diVALproex  milliGRAM(s) Oral daily  enoxaparin Injectable 40 milliGRAM(s) SubCutaneous daily  glucagon  Injectable 1 milliGRAM(s) IntraMuscular once  insulin glargine Injectable (LANTUS) 9 Unit(s) SubCutaneous every morning  insulin lispro (ADMELOG) corrective regimen sliding scale   SubCutaneous three times a day before meals  lisinopril 5 milliGRAM(s) Oral daily  metoprolol tartrate 50 milliGRAM(s) Oral two times a day      SOCIAL HISTORY:  Denies ETOH,Smoking,   FAMILY HISTORY:        REVIEW OF SYSTEMS:  CONSTITUTIONAL: No weakness, fevers or chills  EYES/ENT: No visual changes;  No vertigo or throat pain   NECK: No pain or stiffness  RESPIRATORY: No cough, wheezing, hemoptysis; No shortness of breath  CARDIOVASCULAR: No chest pain or palpitations.  GASTROINTESTINAL: No abdominal or epigastric pain. No nausea, vomiting, or hematemesis; No diarrhea or constipation. No melena or hematochezia.  GENITOURINARY: No dysuria, frequency, foamy urine, urinary urgency, incontinence or hematuria  NEUROLOGICAL: No numbness or weakness  SKIN: No itching, burning, rashes, or lesions   VASCULAR: No bilateral lower extremity edema.   All other review of systems is negative unless indicated above.    VITALS:  T(F): 100.4 (21 @ 05:00), Max: 100.4 (21 @ 05:00)  HR: 92 (21 @ 05:00)  BP: 137/59 (21 @ 05:00)  RR: 18 (21 @ 05:00)  SpO2: 92% (21 @ 05:00)     07:  -   @ 07:00  --------------------------------------------------------  IN: 180 mL / OUT: 700 mL / NET: -520 mL          21 @ 07:01  -  21 @ 07:00  --------------------------------------------------------  IN: 0 mL / OUT: 700 mL / NET: -700 mL      I&O's Detail    24 Mar 2021 07:01  -  25 Mar 2021 07:00  --------------------------------------------------------  IN:    Oral Fluid: 180 mL  Total IN: 180 mL    OUT:    Indwelling Catheter - Urethral (mL): 700 mL  Total OUT: 700 mL    Total NET: -520 mL            PHYSICAL EXAM:  Constitutional: NAD  HEENT: anicteric sclera, oropharynx clear, MMM  Neck: No JVD  Respiratory: CTAB, no wheezes, rales or rhonchi  Cardiovascular: S1, S2, RRR  Gastrointestinal: BS+, soft, NT/ND  Extremities: No cyanosis or clubbing. No peripheral edema  Neurological: A/O x 3, no focal deficits  Psychiatric: Normal mood, normal affect  : No CVA tenderness. No allred.   Skin: No rashes  Vascular Access:    LABS:      145  |  112<H>  |  24<H>  ----------------------------<  266<H>  4.6   |  18  |  1.0    Ca    7.5<L>      24 Mar 2021 06:21  Mg     1.9         TPro  5.2<L>  /  Alb  2.4<L>  /  TBili  0.6  /  DBili      /  AST  49<H>  /  ALT  28  /  AlkPhos  131<H>      Creatinine Trend: 1.0 <--, 0.8 <--, 0.8 <--, 1.1 <--, 1.0 <--, 1.1 <--, 1.5 <--                        8.7    10.14 )-----------( 248      ( 24 Mar 2021 06:21 )             26.9     Urine Studies:  Urinalysis Basic - ( 21 Mar 2021 04:00 )    Color: Yellow / Appearance: Clear / S.010 / pH:   Gluc:  / Ketone: Negative  / Bili: Negative / Urobili: <2 mg/dL   Blood:  / Protein: 30 mg/dL / Nitrite: Negative   Leuk Esterase: Negative / RBC: 3 /HPF / WBC 1 /HPF   Sq Epi:  / Non Sq Epi: 1 /HPF / Bacteria: Negative                RADIOLOGY & ADDITIONAL STUDIES:

## 2021-03-25 NOTE — PROGRESS NOTE ADULT - SUBJECTIVE AND OBJECTIVE BOX
bv treated at presentation, one year f/up for inflammation SUBJECTIVE:    Patient is a 89y old Male who presents with a chief complaint of   Currently admitted to medicine with the primary diagnosis of COVID-19       Today is hospital day 4d. This morning he is resting comfortably in bed and reports no new issues or overnight events.     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 1000 milliLiter(s) IV Continuous <Continuous>  dextrose 5%. 1000 milliLiter(s) IV Continuous <Continuous>  dextrose 5%. 1000 milliLiter(s) IV Continuous <Continuous>  dextrose 50% Injectable 50 milliLiter(s) IV Push once  dextrose 50% Injectable 25 Gram(s) IV Push once  dextrose 50% Injectable 12.5 Gram(s) IV Push once  dextrose 50% Injectable 25 Gram(s) IV Push once  diVALproex  milliGRAM(s) Oral daily  enoxaparin Injectable 40 milliGRAM(s) SubCutaneous daily  glucagon  Injectable 1 milliGRAM(s) IntraMuscular once  insulin glargine Injectable (LANTUS) 9 Unit(s) SubCutaneous every morning  insulin lispro (ADMELOG) corrective regimen sliding scale   SubCutaneous three times a day before meals  insulin lispro Injectable (ADMELOG) 2 Unit(s) SubCutaneous three times a day before meals  lisinopril 5 milliGRAM(s) Oral daily  metoprolol tartrate 50 milliGRAM(s) Oral two times a day    PRN MEDICATIONS  acetaminophen   Tablet .. 650 milliGRAM(s) Oral every 6 hours PRN    VITALS:   T(F): 100.4  HR: 92  BP: 137/59  RR: 18  SpO2: 92%    LABS:                        8.7    10.14 )-----------( 248      ( 24 Mar 2021 06:21 )             26.9     03-24    145  |  112<H>  |  24<H>  ----------------------------<  266<H>  4.6   |  18  |  1.0    Ca    7.5<L>      24 Mar 2021 06:21  Mg     1.9     03-24    TPro  5.2<L>  /  Alb  2.4<L>  /  TBili  0.6  /  DBili  x   /  AST  49<H>  /  ALT  28  /  AlkPhos  131<H>  03-24                  RADIOLOGY:    PHYSICAL EXAM:  GEN: No acute distress  PULM/CHEST: decreased breath sounds bilateral  CVS: Regular rate and rhythm, S1-S2, no murmurs  ABD: Soft, non-tender, non-distended, +BS  EXT: No edema  NEURO: AAOx3      Stevens Catheter:   Indwelling Urethral Catheter:     Connect To:  Straight Drainage/Boaz    Indication:  Urinary Retention / Obstruction (03-24-21 @ 09:10) (not performed) [Active]  Indwelling Urethral Catheter:     Connect To:  Straight Drainage/Boaz    Indication:  Urinary Retention / Obstruction (03-23-21 @ 08:00) (not performed) [Active]  Indwelling Urethral Catheter:     Connect To:  Straight Drainage/Boaz    Indication:  Urinary Retention / Obstruction (03-22-21 @ 08:56) (not performed) [Active]

## 2021-03-25 NOTE — CONSULT NOTE ADULT - ASSESSMENT
Impression:  89 yo M with PMHx of HTN, HLD, Epilepsy ( on VPA and carbamazepine) , CVA with R sided deficits presented with acute change in mental status per son. Found to be covid positive, then transfered to [facility]. Patient transferred from [facility] for worsening hypoxemia on 3/20. Transferred back to [facility] for adequate management.   Treated for acute hypoxic respiratory failure due to COVID PNA.  Hospital course complicated by acute pyelonephritis due to Ecoli. CT head and CTA head and neck did not show any acute stroke or occlusion. Neuro consult called for altered mental status.  Remains encephalopathic, able to say his name.     Suggestion:  Routine EEG.   Agree with medicine plan to get MRI.   Medical management of infectious process.     Ravin Culver NP  [phone]   Impression:  89 yo M with PMHx of HTN, HLD, Epilepsy ( on VPA and carbamazepine) , CVA with R sided deficits presented with acute change in mental status per son. Found to be covid positive, then transfered to [facility]. Patient transferred from [facility] for worsening hypoxemia on 3/20. Transferred back to [facility] for adequate management.   Treated for acute hypoxic respiratory failure due to COVID PNA.  Hospital course complicated by acute pyelonephritis due to Ecoli. CT head and CTA head and neck did not show any acute stroke or occlusion. Neuro consult called for altered mental status.  Remains encephalopathic, able to say his name.     Suggestion:  Routine EEG.   Agree with medicine plan to get MRI.   Medical management of infectious process.   Carbamazepine level.     Ravin Culver NP  [id number]   Impression:  89 yo M with PMHx of HTN, HLD, Epilepsy ( on VPA and carbamazepine) , CVA with R sided deficits presented with acute change in mental status per son. Found to be covid positive, then transfered to [facility]. Patient transferred from [facility] for worsening hypoxemia on 3/20. Transferred back to [facility] for adequate management.   Treated for acute hypoxic respiratory failure due to COVID PNA.  Hospital course complicated by acute pyelonephritis due to Ecoli. CT head and CTA head and neck did not show any acute stroke or occlusion. Neuro consult called for altered mental status.  Remains encephalopathic, able to say his name.     Suggestion:  Routine EEG.   Agree with medicine plan to get MRI.   Medical management of infectious process.   Carbamazepine level.   VPA level    Ravin Culver NP  [phone]

## 2021-03-25 NOTE — CHART NOTE - NSCHARTNOTEFT_GEN_A_CORE
I made rounds today with the treatment team including the hospitalist, residents,  nurses, and discussed the patient's current medical status and discharge  planning needs, and reviewed the chart.    T(C): 35.7 (03-25-21 @ 14:29), Max: 38 (03-25-21 @ 05:00)  HR: 65 (03-25-21 @ 14:29) (65 - 122)  BP: 128/58 (03-25-21 @ 14:29) (110/54 - 137/59)  RR: 18 (03-25-21 @ 14:29) (18 - 18)  SpO2: 92% (03-25-21 @ 05:00) (92% - 92%)          I reached out to the patient's health care proxy/ responsible family member-           [     ]  I reached                                     and discussed the patient's medical condition,                   family concerns, and discharge planning           [     ]  I left a message with family               [   x  ]  I personally participated in rounds with the medical team and my resident and discussed the case. My resident reached                   family member/ HCP      Davy Espitia (son)                          under my direction and supervision  and we reviewed the conversation.          [     ]  My resident left a message with family under my direction and supervision           [     ]   My resident attended medical rounds and called the family                [   x   ]    The following was discussed:  The patient's medical status over the past 24 hrs was reviewed, as well as oxygen needs and medication changes and labs. Patient is on 4 liters NC O2 with pulse ox 94%. Treated for o low blood sugar overnight. Lethargic.          [   x   ]   The following concerns were raised: none          [     ] I spent 5-10 minutes on the above discussing medical issues with team members and family and/ or my resident    [ x    ] I spent 11-20 minutes on the above discussing medical issues with team members and family and/ or my resident    [     ] I spent 21-30 minutes on the above discussing medical issues with team members and family and/ or my resident

## 2021-03-25 NOTE — PROGRESS NOTE ADULT - ATTENDING COMMENTS
88 yo M with PMHx of HTN, HLD, Epilepsy ( on VPA and carbamazepine) , CVA with R sided deficits presented with acute change in mental status per son. found to be covid positive, then transferred to [facility]    Patient transferred from [facility] for worsening hypoxemia on 3/20.   As per transfer note, at 12am vital check patient had SpO2 of 76-78% on 4L NC, oxygen increased to 25LNRB and then titrated down to 15LNRB. Kept patient on 15LNRB for 30mins. After 30mins began to slowly titrate down patient back to 5LNC, however during the process patient appeared more tachypneic and his SpO2 dropped to 89-90%.   Transferred back to [facility] for adequate management.     # Acute hypoxic respiratory failure due to COVID PNA  - Remdesivir 3/16 to 3/20  - Dexamethasone 3/16 to 3/25. May extend upto 3/27?  - S/p empiric cefepime (3/21-23; Procalcitonin : 0.43 3/21). abx d/c'd monitor off abx  -D-dimer 5000 > LE Duplex neg.    3/24: 6L NC. comfortable breathing. Sleepy but arousable. Likely element of Covid encephalopathy. [place] frequently. Get OOB to chair tomorrow and PT soon.   3/25: 4L NC. goes back to sleep after being aroused. PO intake dropping. Ate a little with son at bedside. Spoke coherently to son for a little while before going back to sleep.     # metabolic encephalopathy likely Covid Encephalopathy.   - CT head and CTA head and neck did not show any acute stroke or occlusion.  - On Plavix  - ct head shows Absence of flow related contrast in the left cerebral hemisphere corresponding to a large area of encephalomalacia.   Get MRI, EEG now.   Neuro re-eval    # Hx of CVA with right hemiparesis/ Seizure d/o    - On Plavix, Statin .     # Recent Acute pyelonephritis due to Ecoli   - s/p Ceftriaxone 3/16 to 3/19  - Vantin 3/19 to 3/22  - monitor off abx     # HTN:  -C/w lisinopril and metoprolol    # Type II DM  - On insulin scale  - a1c 7.7,   -RApid response 3/24 night for fingerstick 11. Now only on lantus 9 QAM and SSI per Endocrine. Observe. 89 yo M with PMHx of HTN, HLD, Epilepsy ( on VPA and carbamazepine) , CVA with R sided deficits presented with acute change in mental status per son. found to be covid positive, then transferred to [facility]    Patient transferred from [facility] for worsening hypoxemia on 3/20.   As per transfer note, at 12am vital check patient had SpO2 of 76-78% on 4L NC, oxygen increased to 25LNRB and then titrated down to 15LNRB. Kept patient on 15LNRB for 30mins. After 30mins began to slowly titrate down patient back to 5LNC, however during the process patient appeared more tachypneic and his SpO2 dropped to 89-90%.   Transferred back to [facility] for adequate management.     # Acute hypoxic respiratory failure due to COVID PNA  - Remdesivir 3/16 to 3/20  - Dexamethasone 3/16 to 3/25. May extend upto 3/27?  - S/p empiric cefepime (3/21-23; Procalcitonin : 0.43 3/21). abx d/c'd monitor off abx  -D-dimer 5000 > LE Duplex neg.    3/24: 6L NC. comfortable breathing. Sleepy but arousable. Likely element of Covid encephalopathy. [place] frequently. Get OOB to chair tomorrow and PT soon.   3/25: 4L NC. goes back to sleep after being aroused. PO intake dropping. Ate a little with son at bedside. Spoke coherently to son for a little while before going back to sleep. Made DNR/DNI    # metabolic encephalopathy likely Covid Encephalopathy.   - CT head and CTA head and neck did not show any acute stroke or occlusion.  - On Plavix  - ct head shows Absence of flow related contrast in the left cerebral hemisphere corresponding to a large area of encephalomalacia.   Get MRI, EEG now.   Neuro re-eval    # Hx of CVA with right hemiparesis/ Seizure d/o    - On Plavix, Statin .     # Recent Acute pyelonephritis due to Ecoli   - s/p Ceftriaxone 3/16 to 3/19  - Vantin 3/19 to 3/22  - monitor off abx     # HTN:  -C/w lisinopril and metoprolol    # Type II DM  - On insulin scale  - a1c 7.7,   -RApid response 3/24 night for fingerstick 11. Now only on lantus 9 QAM and SSI per Endocrine. Observe.

## 2021-03-26 NOTE — DIETITIAN INITIAL EVALUATION ADULT. - PERTINENT MEDS FT
MEDICATIONS  (STANDING):  atorvastatin 20 milliGRAM(s) Oral at bedtime  carBAMazepine ER Tablet 200 milliGRAM(s) Oral two times a day  clopidogrel Tablet 75 milliGRAM(s) Oral daily  dexAMETHasone  Injectable 6 milliGRAM(s) IV Push daily  diVALproex  milliGRAM(s) Oral daily  enoxaparin Injectable 40 milliGRAM(s) SubCutaneous daily  insulin glargine Injectable (LANTUS) 9 Unit(s) SubCutaneous every morning  insulin lispro (ADMELOG) corrective regimen sliding scale   SubCutaneous three times a day before meals  lisinopril 5 milliGRAM(s) Oral daily  metoprolol tartrate 50 milliGRAM(s) Oral two times a day

## 2021-03-26 NOTE — DIETITIAN INITIAL EVALUATION ADULT. - OTHER INFO
Acute hypoxic respiratory failure due to COVID PNA. Acute pyelonephritis due to Ecoli. AMS improving. Hx of CVA with right hemiparesis/ Seizure d/o. T2DM. Per SLP,pt lethargic, unable to maintain arousal, not appropriate for PO trials at this time. consider alt means of nutrition if lethargy persists

## 2021-03-26 NOTE — CHART NOTE - NSCHARTNOTEFT_GEN_A_CORE
I made rounds today with the treatment team including the hospitalist, residents,  nurses, and discussed the patient's current medical status and discharge  planning needs, and reviewed the chart.    T(C): 36.9 (03-26-21 @ 04:46), Max: 36.9 (03-26-21 @ 04:46)  HR: 72 (03-26-21 @ 04:58) (65 - 140)  BP: 114/78 (03-26-21 @ 04:46) (114/78 - 138/75)  RR: 18 (03-26-21 @ 11:30) (16 - 40)  SpO2: 100% (03-26-21 @ 11:30) (76% - 100%)          I reached out to the patient's health care proxy/ responsible family member-           [     ]  I reached                                     and discussed the patient's medical condition,                   family concerns, and discharge planning           [     ]  I left a message with family               [   x  ]  I personally participated in rounds with the medical team and my resident and discussed the case. My resident reached                   family member/ HCP       Davy Espitia, his son, [phone]                         under my direction and supervision  and we reviewed the conversation.          [     ]  My resident left a message with family under my direction and supervision           [     ]   My resident attended medical rounds and called the family                [ x     ]    The following was discussed:  The patient's medical status over the past 24 hrs was reviewed, as well as oxygen needs and medication changes and labs. Had episode of tachypnea and tachycardia overnight. O2 needs increased to NRM @ 12 liters O2. CTA ordered to r/o PE. To f/u on AED levels per neuro. Sugars better controlled.         [   x   ]   The following concerns were raised: He feels that his father is more intact cognitively than doctors are saying. Needs encouragement/ assistance to eat. To be relayed to unit manager.          [     ] I spent 5-10 minutes on the above discussing medical issues with team members and family and/ or my resident    [  x   ] I spent 11-20 minutes on the above discussing medical issues with team members and family and/ or my resident    [     ] I spent 21-30 minutes on the above discussing medical issues with team members and family and/ or my resident

## 2021-03-26 NOTE — CONSULT NOTE ADULT - ASSESSMENT
IMPRESSION:  Sepsis POA  AHRF  Severe Covid PNA s/p remdesivir  Probable superimposed bacterial PNA  Encephalopathy  Hypernatremia  HO Epilepsy  HO CVA  HO SDH    PLAN:    CNS: avoid sedatives, Continue AEDs, Neuro follow up given EEG results    HEENT: Oral care    PULMONARY:  HOB @ 45 degrees.  Aspiration precautions. Bipap 14/8- 4 hours on/4 hours off, alternate with HFNCO2. Continue dexamethasone    CARDIOVASCULAR:  goal MAP >60    GI: GI prophylaxis.  Feeding.  Bowel regimen. NG tube, replete free water deficit     RENAL:  Follow up lytes.  Correct as needed    INFECTIOUS DISEASE: Follow up cultures. Start Unasyn + Levaquin.    HEMATOLOGICAL:  DVT prophylaxis.    ENDOCRINE:  Follow up FS.  Insulin protocol if needed.    MUSCULOSKELETAL: bedrest    Dispo: SDU    DNR/DNI    Very poor prognosis

## 2021-03-26 NOTE — CONSULT NOTE ADULT - ATTENDING COMMENTS
AHRF  Severe Covid PNA s/p remdesivir  Probable superimposed bacterial PNA    Agree with plan as above

## 2021-03-26 NOTE — DIETITIAN INITIAL EVALUATION ADULT. - ADD RECOMMEND
Order Ensure Pudding TID and prosource gel QD. Continue Dys 3 soft nectar, carb consistent, DASH/TLC diet. Needs 1:1 feeds. Will f/u plan of care

## 2021-03-26 NOTE — PROGRESS NOTE ADULT - ASSESSMENT
88 yo M with PMHx of HTN, HLD, Epilepsy ( on VPA and carbamazepine) , CVA with R sided deficits presented with acute change in mental status per son. found to be covid positive, then transfered to [facility]    Patient transferred from [facility] for worsening hypoxemia on 3/20.   As per transfer note, at 12am vital check patient had SpO2 of 76-78% on 4L NC, oxygen increased to 25LNRB and then titrated down to 15LNRB. Kept patient on 15LNRB for 30mins. After 30mins began to slowly titrate down patient back to 5LNC, however during the process patient appeared more tachypneic and his SpO2 dropped to 89-90%.   Transferred back to [facility] for adequate management.     # Acute hypoxic respiratory failure due to COVID PNA  - Remdesivir 3/16 to 3/20  - Dexamethasone 3/16 to 3/25  - currently 6l NC, keep pulse ox >88%, wean off o2 as toelrated  - Starting empirically on cefepime on admission, Procalcitonin : 0.43 3/21, abx d/c'd monitor off abx  - tachypenic and tachycardia overnight 3/26 with associated stomach breathing. EKG shows sinus tach. d-dimer 5k LE duplex negative. afebrile Follow up am labs. well's score 6 consider CT chest PE protocol today due to high pre test probability for PE    # Acute pyelonephritis due to Ecoli   - s/p Ceftriaxone 3/16 to 3/19  - Vantin 3/19 to 3/22  - monitor off abx    #Altered Mental status >> likely due to metabolic encephalopathy , Doubt  stroke:   Improving.   - CT head and CTA head and neck did not show any acute stroke or occlusion.  - echo and MR head still pending for when stable from COVID-19 point of view  - On Plavix  - ct head shows Absence of flow related contrast in the left cerebral hemisphere corresponding to a large area of encephalomalacia.   - Follow up EEG, VPA and carbamazapine level and MR head    # Hx of CVA with right hemiparesis/ Seizure d/o    - On Plavix, Statin .      # HTN:  -C/w lisinopril and metoprolol    # Type II DM  - On insulin lantus   - a1c 7.7,   - endo consult done, omonitor fs 89 yo M with PMHx of HTN, HLD, Epilepsy ( on VPA and carbamazepine) , CVA with R sided deficits presented with acute change in mental status per son. found to be covid positive, then transfered to [facility]    Patient transferred from [facility] for worsening hypoxemia on 3/20.   As per transfer note, at 12am vital check patient had SpO2 of 76-78% on 4L NC, oxygen increased to 25LNRB and then titrated down to 15LNRB. Kept patient on 15LNRB for 30mins. After 30mins began to slowly titrate down patient back to 5LNC, however during the process patient appeared more tachypneic and his SpO2 dropped to 89-90%.   Transferred back to [facility] for adequate management.     # Acute hypoxic respiratory failure due to COVID PNA  - Remdesivir 3/16 to 3/20  - Dexamethasone 3/16 to 3/25  - currently NRB, keep pulse ox >88%, wean off o2 as toelrated  - Starting empirically on cefepime on admission, Procalcitonin : 0.43 3/21, abx d/c'd monitor off abx  - tachypenic and tachycardia overnight 3/26 with associated stomach breathing. EKG shows sinus tach. d-dimer 5k LE duplex negative. afebrile Follow up am labs. well's score 6 consider CT chest PE protocol today due to high pre test probability for PE  -desat'd this am to 80% on NRB, stat abg, bipap 12/6, ICU eval    # Acute pyelonephritis due to Ecoli   - s/p Ceftriaxone 3/16 to 3/19  - Vantin 3/19 to 3/22  - monitor off abx    #Altered Mental status >> likely due to metabolic encephalopathy , Doubt  stroke:   Improving.   - CT head and CTA head and neck did not show any acute stroke or occlusion.  - echo and MR head still pending for when stable from COVID-19 point of view  - On Plavix  - ct head shows Absence of flow related contrast in the left cerebral hemisphere corresponding to a large area of encephalomalacia.   - Follow up EEG, VPA and carbamazapine level and MR head  - poor po intake, evaluated by S&S  - now has hypernatremia and VITA likely secondary to poor po intake, start d5 iv hydration, avoid fluid overload, Follow up labs    # Hx of CVA with right hemiparesis/ Seizure d/o    - On Plavix, Statin .      # HTN:  -C/w lisinopril and metoprolol    # Type II DM  - On insulin lantus   - a1c 7.7,   - endo consult done, monitor fs    code statues: dnr/dni

## 2021-03-26 NOTE — CONSULT NOTE ADULT - SUBJECTIVE AND OBJECTIVE BOX
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  No cardiovascular or pulmonary family history     REVIEW OF SYSTEMS:    All ROS are negative exept per HPI       Allergies    Ambien (Other)  Dilantin (Unknown)  phenobarbital (Unknown)    Intolerances          PHYSICAL EXAM  Vital Signs Last 24 Hrs  T(C): 36.9 (26 Mar 2021 04:46), Max: 36.9 (26 Mar 2021 04:46)  T(F): 98.4 (26 Mar 2021 04:46), Max: 98.4 (26 Mar 2021 04:46)  HR: 72 (26 Mar 2021 04:58) (65 - 140)  BP: 114/78 (26 Mar 2021 04:46) (114/78 - 138/75)  BP(mean): --  RR: 18 (26 Mar 2021 11:30) (16 - 40)  SpO2: 100% (26 Mar 2021 11:30) (76% - 100%)    CONSTITUTIONAL:  NAD    ENT:   Airway patent,   No thrush  on NRB    EYES:   Clear bilaterally,   pupils equal,   round and reactive to light.    CARDIAC:   Normal rate,   regular rhythm.    no edema      RESPIRATORY:   No wheezing   Normal chest expansion  tachypneic,  No use of accessory muscles    GASTROINTESTINAL:  Abdomen soft, non-tender,   No guarding,     MUSCULOSKELETAL:   Range of motion is not limited,  No clubbing, cyanosis    NEUROLOGICAL:   alert  nonresponsive verbally  can follow simple commands  hand in soft mittens    SKIN:   Skin normal color for race,   No evidence of rash.      HEME LYMPH:   No cervical  lymphadenopathy.  no inguinal lymphadenopathy          LABS:                          8.4    7.49  )-----------( 206      ( 26 Mar 2021 07:33 )             26.4                                               03-26    149<H>  |  115<H>  |  62<HH>  ----------------------------<  200<H>  4.5   |  22  |  1.9<H>    Ca    7.4<L>      26 Mar 2021 07:33  Mg     2.4     03-26    TPro  5.3<L>  /  Alb  2.3<L>  /  TBili  0.6  /  DBili  x   /  AST  45<H>  /  ALT  21  /  AlkPhos  163<H>  03-26                                                 CARDIAC MARKERS ( 26 Mar 2021 12:09 )  x     / 0.04 ng/mL / x     / x     / x      CARDIAC MARKERS ( 26 Mar 2021 07:33 )  x     / 0.05 ng/mL / x     / x     / x                                                LIVER FUNCTIONS - ( 26 Mar 2021 07:33 )  Alb: 2.3 g/dL / Pro: 5.3 g/dL / ALK PHOS: 163 U/L / ALT: 21 U/L / AST: 45 U/L / GGT: x                                                                                            ABG - ( 26 Mar 2021 09:59 )  pH, Arterial: 7.44  pH, Blood: x     /  pCO2: 29    /  pO2: 129   / HCO3: 20    / Base Excess: -3.6  /  SaO2: 98                  MEDICATIONS  (STANDING):  atorvastatin 20 milliGRAM(s) Oral at bedtime  carBAMazepine ER Tablet 200 milliGRAM(s) Oral two times a day  clopidogrel Tablet 75 milliGRAM(s) Oral daily  dexAMETHasone  Injectable 6 milliGRAM(s) IV Push daily  dextrose 40% Gel 15 Gram(s) Oral once  dextrose 5%. 1000 milliLiter(s) (50 mL/Hr) IV Continuous <Continuous>  dextrose 5%. 1000 milliLiter(s) (100 mL/Hr) IV Continuous <Continuous>  dextrose 5%. 1000 milliLiter(s) (75 mL/Hr) IV Continuous <Continuous>  dextrose 50% Injectable 50 milliLiter(s) IV Push once  dextrose 50% Injectable 25 Gram(s) IV Push once  dextrose 50% Injectable 12.5 Gram(s) IV Push once  dextrose 50% Injectable 25 Gram(s) IV Push once  diVALproex  milliGRAM(s) Oral daily  enoxaparin Injectable 40 milliGRAM(s) SubCutaneous daily  glucagon  Injectable 1 milliGRAM(s) IntraMuscular once  insulin glargine Injectable (LANTUS) 9 Unit(s) SubCutaneous every morning  insulin lispro (ADMELOG) corrective regimen sliding scale   SubCutaneous three times a day before meals  lisinopril 5 milliGRAM(s) Oral daily  metoprolol tartrate 50 milliGRAM(s) Oral two times a day    MEDICATIONS  (PRN):  acetaminophen   Tablet .. 650 milliGRAM(s) Oral every 6 hours PRN Temp greater or equal to 38C (100.4F), Mild Pain (1 - 3)      < from: EEG (03.25.21 @ 17:20) >  This is an abnormal routine EEG due to the presence of  1.-Focal and generalized slowing as described above  2.-Abnormal interictal activity as described above    < end of copied text >    < from: VA Duplex Lower Ext Vein Scan, Bilat (03.24.21 @ 13:48) >  No evidence of deep venous thrombosis or superficial thrombophlebitis in the bilateral lower extremities.    < end of copied text >  < from: Xray Chest 1 View- PORTABLE-Urgent (Xray Chest 1 View- PORTABLE-Urgent .) (03.26.21 @ 11:04) >  Bilateral diffuse opacities. No pneumothorax or pleural effusions.    < end of copied text >

## 2021-03-26 NOTE — DIETITIAN INITIAL EVALUATION ADULT. - ORAL INTAKE PTA/DIET HISTORY
Attempted to call pt's son but no answer via phone. No previous RD notes on last admission. NKFA per EMR

## 2021-03-26 NOTE — DIETITIAN INITIAL EVALUATION ADULT. - REASON INDICATOR FOR ASSESSMENT
LOS seen early d/t poor po reported by staff (Unable to conduct face to face interview or NFPE d/t pt's isolation precautions r/t COVID-19)

## 2021-03-26 NOTE — DIETITIAN INITIAL EVALUATION ADULT. - OTHER CALCULATIONS
Calories: 1384 - 1500 kcals (MSJ x 1.2 -1.3 AF) Protein: 55 - 66 gms (1.0 -1.2 gm/kg) Fluid: 1ml/kcal or per LIP

## 2021-03-26 NOTE — PROGRESS NOTE ADULT - ATTENDING COMMENTS
89 yo M with PMHx of HTN, HLD, Epilepsy ( on VPA and carbamazepine) , CVA with R sided deficits presented with acute change in mental status per son. found to be covid positive, then transferred to [facility]    Patient transferred from [facility] for worsening hypoxemia on 3/20.   As per transfer note, at 12am vital check patient had SpO2 of 76-78% on 4L NC, oxygen increased to 25LNRB and then titrated down to 15LNRB. Kept patient on 15LNRB for 30mins. After 30mins began to slowly titrate down patient back to 5LNC, however during the process patient appeared more tachypneic and his SpO2 dropped to 89-90%.   Transferred back to [facility] for adequate management.     # Acute hypoxic respiratory failure due to COVID PNA  - Remdesivir 3/16 to 3/20  - Dexamethasone 3/16 to 3/25. May extend upto 3/27?  - S/p empiric cefepime (3/21-23; Procalcitonin : 0.43 3/21). But 3/24 Procalc back up to 1.16. CT shows air bronchograms besides the diffuse GGOs. Adding Unasyn, LVQ per Pulm on 3/26.   -D-dimer 5000 > LE Duplex neg.    3/24: 6L NC. comfortable breathing. Sleepy but arousable. Likely element of Covid encephalopathy. [place] frequently. Get OOB to chair tomorrow and PT soon.   3/25: 4L NC. goes back to sleep after being aroused. PO intake dropping. Ate a little with son at bedside. Spoke coherently to son for a little while before going back to sleep. Made DNR/DNI  3/26: Overnight tachycardic transiently 138 bpm. Sats worsened to NRM. Still on NRM. CTA neg for PE but diffuse GGOs in almost 80% of parenchyma with some air bronchograms on ALLI. Adding Unasyn, LVQ.     # metabolic encephalopathy likely Covid Encephalopathy.   - CT head and CTA head and neck did not show any acute stroke or occlusion.  - On Plavix  - ct head shows Absence of flow related contrast in the left cerebral hemisphere corresponding to a large area of encephalomalacia.   EEG - focal left hemispheric slowing.   Pending MRI, Neuro re-eval    #VITA, Hypernatremia:  D/t Poor po intake d/t covid encephalopathy. wakes to take his meds but otherwise poor appetite.   D5W @ 75. Monitor BMP.   If appetite remains poor, consider NG tomorrow after discussion with family.     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     # Recent Acute pyelonephritis due to Ecoli   - s/p Ceftriaxone 3/16 to 3/19  - Vantin 3/19 to 3/22       # HTN:  -C/w lisinopril and metoprolol    # Type II DM  - On insulin scale  - a1c 7.7,   -RApid response 3/24 night for fingerstick 11. Now only on lantus 9 QAM and SSI per Endocrine. Observe.    Dispo: Active as above

## 2021-03-26 NOTE — PROGRESS NOTE ADULT - SUBJECTIVE AND OBJECTIVE BOX
SUBJECTIVE:    Patient is a 89y old Male who presents with a chief complaint of ams (25 Mar 2021 20:11)    Currently admitted to medicine with the primary diagnosis of COVID-19       Today is hospital day 5d. This morning he is resting comfortably in bed and reports no new issues or overnight events.     INTERVAL EVENTS:   tachypenic and tachycardia overnight with associated stomach breathing. EKG shows sinus tach. d-dimer 5k le duplex negative. afebrile Follow up am labs. 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 1000 milliLiter(s) IV Continuous <Continuous>  dextrose 5%. 1000 milliLiter(s) IV Continuous <Continuous>  dextrose 50% Injectable 50 milliLiter(s) IV Push once  dextrose 50% Injectable 25 Gram(s) IV Push once  dextrose 50% Injectable 12.5 Gram(s) IV Push once  dextrose 50% Injectable 25 Gram(s) IV Push once  diVALproex  milliGRAM(s) Oral daily  enoxaparin Injectable 40 milliGRAM(s) SubCutaneous daily  glucagon  Injectable 1 milliGRAM(s) IntraMuscular once  insulin glargine Injectable (LANTUS) 9 Unit(s) SubCutaneous every morning  insulin lispro (ADMELOG) corrective regimen sliding scale   SubCutaneous three times a day before meals  lisinopril 5 milliGRAM(s) Oral daily  metoprolol tartrate 50 milliGRAM(s) Oral two times a day    PRN MEDICATIONS  acetaminophen   Tablet .. 650 milliGRAM(s) Oral every 6 hours PRN    VITALS:   T(F): 98.4  HR: 72  BP: 114/78  RR: 16  SpO2: 97%    LABS:                        RADIOLOGY:    PHYSICAL EXAM:  GEN: lethargic  PULM/CHEST: Clear to auscultation bilaterally, no rales, rhonchi or wheezes   CVS: Regular rate and rhythm, S1-S2, no murmurs  ABD: Soft, non-tender, non-distended, +BS  EXT: No edema  NEURO: AAOx3    Stevens Catheter:   Indwelling Urethral Catheter:     Connect To:  Straight Drainage/[place]    Indication:  Urinary Retention / Obstruction (03-25-21 @ 23:08) (not performed) [Active]

## 2021-03-27 NOTE — PROGRESS NOTE ADULT - ATTENDING COMMENTS
89 yo M with PMHx of HTN, HLD, Epilepsy ( on VPA and carbamazepine) , CVA with R sided deficits presented with acute change in mental status per son. found to be covid positive, then transferred to [facility]    Patient transferred from [facility] for worsening hypoxemia on 3/20.   As per transfer note, at 12am vital check patient had SpO2 of 76-78% on 4L NC, oxygen increased to 25LNRB and then titrated down to 15LNRB. Kept patient on 15LNRB for 30mins. After 30mins began to slowly titrate down patient back to 5LNC, however during the process patient appeared more tachypneic and his SpO2 dropped to 89-90%.   Transferred back to [facility] for adequate management.     # Acute hypoxic respiratory failure due to COVID PNA  - Remdesivir 3/16 to 3/20  - Dexamethasone 3/16 to 3/25. May extend upto 3/27?  - S/p empiric cefepime (3/21-23; Procalcitonin : 0.43 3/21). But 3/24 Procalc back up to 1.16. CT shows air bronchograms besides the diffuse GGOs. Adding Unasyn, LVQ per Pulm on 3/26.   -D-dimer 5000 > LE Duplex neg.    3/24: 6L NC. comfortable breathing. Sleepy but arousable. Likely element of Covid encephalopathy. [place] frequently. Get OOB to chair tomorrow and PT soon.   3/25: 4L NC. goes back to sleep after being aroused. PO intake dropping. Ate a little with son at bedside. Spoke coherently to son for a little while before going back to sleep. Made DNR/DNI  3/26: Overnight tachycardic transiently 138 bpm. Sats worsened to NRM. Still on NRM. CTA neg for PE but diffuse GGOs in almost 80% of parenchyma with some air bronchograms on ALLI. Adding Unasyn, LVQ.   3/27: Overnight again tachycardic episode of 130s transiently. Bipap overnight. Now back on NRM. Sleeps all day long. c/w Dexa, Unasyn, LVQ.   NG feeds ok per family for now. Increase to D5W @ 100 for persistent/worsening hypernatremia.     # metabolic encephalopathy likely Covid Encephalopathy.   - CT head and CTA head and neck did not show any acute stroke or occlusion.  - On Plavix  - ct head shows Absence of flow related contrast in the left cerebral hemisphere corresponding to a large area of encephalomalacia.   EEG - focal left hemispheric slowing. Valproate level low. Adjust dose? Unreliable intake.   Pending MRI, Neuro re-eval    #VITA, Hypernatremia:  D/t Poor po intake d/t covid encephalopathy. wakes to take his meds but otherwise poor appetite.   Now worsened to 152.   Increase to D5W @ 100. Monitor BMP. Nephro eval.  NG feeds ok per family. FW Flushes 100 pre, 200 post feeds Q6H .     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     # Recent Acute pyelonephritis due to Ecoli   - s/p Ceftriaxone 3/16 to 3/19  - Vantin 3/19 to 3/22       # HTN:  -C/w lisinopril and metoprolol    # Type II DM  - On insulin scale  - a1c 7.7,   -RApid response 3/24 night for fingerstick 11. Now only on lantus 9 QAM and SSI per Endocrine. Observe.    Dispo: Active as above . Prognosis guarded. DNR/dNI

## 2021-03-27 NOTE — PROGRESS NOTE ADULT - ASSESSMENT
89 yo M with PMHx of HTN, HLD, Epilepsy ( on VPA and carbamazepine) , CVA with R sided deficits presented with acute change in mental status per son. found to be covid positive, then transfered to [facility]    Patient transferred from [facility] for worsening hypoxemia on 3/20.   As per transfer note, at 12am vital check patient had SpO2 of 76-78% on 4L NC, oxygen increased to 25LNRB and then titrated down to 15LNRB. Kept patient on 15LNRB for 30mins. After 30mins began to slowly titrate down patient back to 5LNC, however during the process patient appeared more tachypneic and his SpO2 dropped to 89-90%.   Transferred back to [facility] for adequate management.     # Acute hypoxic respiratory failure due to COVID PNA  - Remdesivir 3/16 to 3/20  - Dexamethasone 3/16 to 3/25  - currently on bipap, keep pulse ox >88%, wean off o2 as toelrated  - Starting empirically on cefepime on admission, Procalcitonin : 0.43 3/21, abx d/c'd monitor off abx  - tachypenic and tachycardia overnight 3/26 with associated stomach breathing. EKG shows sinus tach. d-dimer 5k LE duplex negative. afebrile Follow up am labs. well's score 6 consider CT chest PE protocol negative for PE, however air broncogram were seen, start unasyn and lavaquin    # Acute pyelonephritis due to Ecoli   - s/p Ceftriaxone 3/16 to 3/19  - Vantin 3/19 to 3/22  - monitor off abx    #Altered Mental status >> likely due to metabolic encephalopathy , Doubt  stroke:   Improving.   - CT head and CTA head and neck did not show any acute stroke or occlusion.  - echo and MR head still pending for when stable from COVID-19 point of view  - On Plavix  - ct head shows Absence of flow related contrast in the left cerebral hemisphere corresponding to a large area of encephalomalacia.   - Follow up EEG, VPA and carbamazapine level and MR head  - poor po intake, evaluated by S&S  - now has hypernatremia and VITA likely secondary to poor po intake, start d5 iv hydration, avoid fluid overload, Follow up labs    # Hx of CVA with right hemiparesis/ Seizure d/o    - On Plavix, Statin .      # HTN:  -C/w lisinopril and metoprolol    # Type II DM  - On insulin lantus   - a1c 7.7,   - endo consult done, monitor fs    code statues: dnr/dni

## 2021-03-27 NOTE — PROGRESS NOTE ADULT - SUBJECTIVE AND OBJECTIVE BOX
SUBJECTIVE:    Patient is a 89y old Male who presents with a chief complaint of covid pna (26 Mar 2021 15:05)    Currently admitted to medicine with the primary diagnosis of COVID-19       Today is hospital day 6d.     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    Diabetes mellitus    HTN (hypertension)    Seizures    CVA (cerebral vascular accident)  x 4    Acute subdural hematoma  With surgical intervention    History of total hip replacement, right    H/O hernia repair        ALLERGIES:  Ambien (Other)  Dilantin (Unknown)  phenobarbital (Unknown)    MEDICATIONS:  STANDING MEDICATIONS  ampicillin/sulbactam  IVPB 3 Gram(s) IV Intermittent every 12 hours  atorvastatin 20 milliGRAM(s) Oral at bedtime  carBAMazepine ER Tablet 200 milliGRAM(s) Oral two times a day  clopidogrel Tablet 75 milliGRAM(s) Oral daily  dexAMETHasone  Injectable 6 milliGRAM(s) IV Push daily  dextrose 40% Gel 15 Gram(s) Oral once  dextrose 5%. 1000 milliLiter(s) IV Continuous <Continuous>  dextrose 5%. 1000 milliLiter(s) IV Continuous <Continuous>  dextrose 5%. 1000 milliLiter(s) IV Continuous <Continuous>  dextrose 50% Injectable 50 milliLiter(s) IV Push once  dextrose 50% Injectable 25 Gram(s) IV Push once  dextrose 50% Injectable 12.5 Gram(s) IV Push once  dextrose 50% Injectable 25 Gram(s) IV Push once  diVALproex  milliGRAM(s) Oral daily  enoxaparin Injectable 40 milliGRAM(s) SubCutaneous daily  glucagon  Injectable 1 milliGRAM(s) IntraMuscular once  insulin glargine Injectable (LANTUS) 9 Unit(s) SubCutaneous every morning  insulin lispro (ADMELOG) corrective regimen sliding scale   SubCutaneous three times a day before meals  levoFLOXacin IVPB 750 milliGRAM(s) IV Intermittent every 48 hours  lisinopril 5 milliGRAM(s) Oral daily  metoprolol tartrate 50 milliGRAM(s) Oral two times a day    PRN MEDICATIONS  acetaminophen   Tablet .. 650 milliGRAM(s) Oral every 6 hours PRN    VITALS:   T(F): 98.4  HR: 100  BP: 129/61  RR: 18  SpO2: 96%    LABS:                        8.4    7.49  )-----------( 206      ( 26 Mar 2021 07:33 )             26.4     03-26    149<H>  |  115<H>  |  62<HH>  ----------------------------<  200<H>  4.5   |  22  |  1.9<H>    Ca    7.4<L>      26 Mar 2021 07:33  Mg     2.4     03-26    TPro  5.3<L>  /  Alb  2.3<L>  /  TBili  0.6  /  DBili  x   /  AST  45<H>  /  ALT  21  /  AlkPhos  163<H>  03-26        ABG - ( 26 Mar 2021 09:59 )  pH, Arterial: 7.44  pH, Blood: x     /  pCO2: 29    /  pO2: 129   / HCO3: 20    / Base Excess: -3.6  /  SaO2: 98                Troponin T, Serum: 0.04 ng/mL <HH> (03-26-21 @ 12:09)      CARDIAC MARKERS ( 26 Mar 2021 12:09 )  x     / 0.04 ng/mL / x     / x     / x      CARDIAC MARKERS ( 26 Mar 2021 07:33 )  x     / 0.05 ng/mL / x     / x     / x          RADIOLOGY:    PHYSICAL EXAM:  GEN: lethargic  PULM/CHEST: bilateral decreased breath sounds  CVS: Regular rate and rhythm, S1-S2, no murmurs  ABD: Soft, non-tender, non-distended, +BS  EXT: No edema  NEURO: AAOx3    Stevens Catheter:   Indwelling Urethral Catheter:     Connect To:  Straight Drainage/[place]    Indication:  Urinary Retention / Obstruction (03-25-21 @ 23:08) (not performed) [Active]

## 2021-03-28 NOTE — CONSULT NOTE ADULT - ASSESSMENT
89 yo M with PMHx of HTN, HLD, Epilepsy ( on VPA and carbamazepine) , CVA with R sided deficits, DMII on Januvia sp covid diagnosis now with VITA    ·	VITA most likely prerenal/ Hypernatremia/ hyperkalemia/ acidemia   ·	continue IVF switch to d5W with 75 meq bicarb at 100 cc per hour  ·	check UA and urine lytes U osm   ·	renal bladder sono  ·	Hold LMWH / anemia and VITA / No AVE inh or ARB   ·	dose antibx to GFR < 15  ·	allred / strict I and O   ·	BMP tonight and in AM   ·	check ABG   ·	no need for RRT     will follow

## 2021-03-28 NOTE — PROGRESS NOTE ADULT - SUBJECTIVE AND OBJECTIVE BOX
S: No  new events/complaints      All other pertinent ROS negative.      03-27-21 @ 07:01  -  03-28-21 @ 07:00  --------------------------------------------------------  IN: 0 mL / OUT: 500 mL / NET: -500 mL    03-28-21 @ 07:01  -  03-28-21 @ 18:52  --------------------------------------------------------  IN: 0 mL / OUT: 250 mL / NET: -250 mL      Vital Signs Last 24 Hrs  T(C): 36.8 (28 Mar 2021 15:31), Max: 36.8 (28 Mar 2021 15:31)  T(F): 98.2 (28 Mar 2021 15:31), Max: 98.2 (28 Mar 2021 15:31)  HR: 82 (28 Mar 2021 15:31) (82 - 118)  BP: 125/52 (28 Mar 2021 15:31) (119/54 - 152/59)  BP(mean): --  RR: 27 (28 Mar 2021 11:00) (18 - 27)  SpO2: 100% (28 Mar 2021 11:00) (76% - 100%)  PHYSICAL EXAM:    Constitutional: NAD, awake and alert, well-developed  HEENT: PERR, EOMI, Normal Hearing, MMM  Neck: Soft and supple, No LAD, No JVD  Respiratory: moderate respi distress on bipap. bilateral fine crackles   Cardiovascular: S1 and S2, regular rate and rhythm, no Murmurs, gallops or rubs  Gastrointestinal: Bowel Sounds present, soft, nontender, nondistended, no guarding, no rebound  Extremities: No peripheral edema      MEDICATIONS:  MEDICATIONS  (STANDING):  ampicillin/sulbactam  IVPB 3 Gram(s) IV Intermittent every 12 hours  atorvastatin 20 milliGRAM(s) Oral at bedtime  carBAMazepine ER Capsule 200 milliGRAM(s) Oral two times a day  clopidogrel Tablet 75 milliGRAM(s) Oral daily  dexAMETHasone  Injectable 6 milliGRAM(s) IV Push daily  dextrose 40% Gel 15 Gram(s) Oral once  dextrose 5%. 1000 milliLiter(s) (50 mL/Hr) IV Continuous <Continuous>  dextrose 5%. 1000 milliLiter(s) (100 mL/Hr) IV Continuous <Continuous>  dextrose 50% Injectable 50 milliLiter(s) IV Push once  dextrose 50% Injectable 25 Gram(s) IV Push once  dextrose 50% Injectable 12.5 Gram(s) IV Push once  dextrose 50% Injectable 25 Gram(s) IV Push once  diVALproex  milliGRAM(s) Oral daily  glucagon  Injectable 1 milliGRAM(s) IntraMuscular once  insulin glargine Injectable (LANTUS) 9 Unit(s) SubCutaneous every morning  insulin lispro (ADMELOG) corrective regimen sliding scale   SubCutaneous three times a day before meals  iohexol 300 mG (iodine)/mL Oral Solution 30 milliLiter(s) Oral once  levoFLOXacin IVPB 750 milliGRAM(s) IV Intermittent every 48 hours  metoprolol tartrate 50 milliGRAM(s) Oral two times a day  pantoprazole  Injectable 40 milliGRAM(s) IV Push every 12 hours      LABS: All Labs Reviewed:                        5.8    5.23  )-----------( 179      ( 28 Mar 2021 07:16 )             18.2     03-28    148<H>  |  114<H>  |  68<HH>  ----------------------------<  167<H>  4.2   |  17  |  1.9<H>    Ca    7.2<L>      28 Mar 2021 07:16  Mg     2.2     03-28    TPro  5.3<L>  /  Alb  2.0<L>  /  TBili  0.8  /  DBili  x   /  AST  70<H>  /  ALT  23  /  AlkPhos  337<H>  03-28          Blood Culture:     Radiology: reviewed

## 2021-03-28 NOTE — CHART NOTE - NSCHARTNOTEFT_GEN_A_CORE
acute drop in hgb. STAT type+screen ordered  Will order 2U prbc and CTAP w/wo contrast acute drop in hgb. STAT type+screen ordered  Will order 2U prbc and CTAP w/wo contrast  d/c'ed lisinopril in setting of VITA  administered 1 bolus of LR acute drop in hgb. STAT type+screen ordered  Will order 2U prbc and CTAP w/wo contrast  d/c'ed lisinopril in setting of VITA  administered 1 bolus of d5w acute drop in hgb. STAT type+screen ordered  Will order 2U prbc and CTAP w/wo contrast  d/c'ed lisinopril in setting of VIAT  administered 1 bolus of LR

## 2021-03-28 NOTE — CONSULT NOTE ADULT - SUBJECTIVE AND OBJECTIVE BOX
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    Diabetes mellitus    HTN (hypertension)    Seizures    CVA (cerebral vascular accident)  x 4    Acute subdural hematoma  With surgical intervention    History of total hip replacement, right    H/O hernia repair      Allergies:  Ambien (Other)  Dilantin (Unknown)  phenobarbital (Unknown)    Home Medications Reviewed  Hospital Medications:   MEDICATIONS  (STANDING):  ampicillin/sulbactam  IVPB 3 Gram(s) IV Intermittent every 12 hours  atorvastatin 20 milliGRAM(s) Oral at bedtime  carBAMazepine ER Capsule 200 milliGRAM(s) Oral two times a day  clopidogrel Tablet 75 milliGRAM(s) Oral daily  dexAMETHasone  Injectable 6 milliGRAM(s) IV Push daily  dextrose 40% Gel 15 Gram(s) Oral once  dextrose 5%. 1000 milliLiter(s) (50 mL/Hr) IV Continuous <Continuous>  dextrose 5%. 1000 milliLiter(s) (100 mL/Hr) IV Continuous <Continuous>  dextrose 5%. 1000 milliLiter(s) (100 mL/Hr) IV Continuous <Continuous>  dextrose 50% Injectable 50 milliLiter(s) IV Push once  dextrose 50% Injectable 25 Gram(s) IV Push once  dextrose 50% Injectable 12.5 Gram(s) IV Push once  dextrose 50% Injectable 25 Gram(s) IV Push once  diVALproex  milliGRAM(s) Oral daily  enoxaparin Injectable 40 milliGRAM(s) SubCutaneous daily  glucagon  Injectable 1 milliGRAM(s) IntraMuscular once  insulin glargine Injectable (LANTUS) 9 Unit(s) SubCutaneous every morning  insulin lispro (ADMELOG) corrective regimen sliding scale   SubCutaneous three times a day before meals  levoFLOXacin IVPB 750 milliGRAM(s) IV Intermittent every 48 hours  lisinopril 5 milliGRAM(s) Oral daily  metoprolol tartrate 50 milliGRAM(s) Oral two times a day      SOCIAL HISTORY:  Denies ETOH,Smoking,   FAMILY HISTORY:        REVIEW OF SYSTEMS:  CONSTITUTIONAL: No weakness, fevers or chills  EYES/ENT: No visual changes;  No vertigo or throat pain   NECK: No pain or stiffness  RESPIRATORY: No cough, wheezing, hemoptysis; No shortness of breath  CARDIOVASCULAR: No chest pain or palpitations.  GASTROINTESTINAL: No abdominal or epigastric pain. No nausea, vomiting, or hematemesis; No diarrhea or constipation. No melena or hematochezia.  GENITOURINARY: No dysuria, frequency, foamy urine, urinary urgency, incontinence or hematuria  NEUROLOGICAL: No numbness or weakness  SKIN: No itching, burning, rashes, or lesions   VASCULAR: No bilateral lower extremity edema.   All other review of systems is negative unless indicated above.    VITALS:  T(F): 96.1 (03-28-21 @ 04:56), Max: 96.1 (03-28-21 @ 04:56)  HR: 94 (03-28-21 @ 05:20)  BP: 119/54 (03-28-21 @ 04:56)  RR: 18 (03-28-21 @ 04:56)  SpO2: 100% (03-28-21 @ 05:20)    03-27 @ 07:01  -  03-28 @ 07:00  --------------------------------------------------------  IN: 0 mL / OUT: 500 mL / NET: -500 mL          03-27-21 @ 07:01  -  03-28-21 @ 07:00  --------------------------------------------------------  IN: 0 mL / OUT: 500 mL / NET: -500 mL      I&O's Detail    27 Mar 2021 07:01  -  28 Mar 2021 07:00  --------------------------------------------------------  IN:  Total IN: 0 mL    OUT:    Indwelling Catheter - Urethral (mL): 500 mL  Total OUT: 500 mL    Total NET: -500 mL            PHYSICAL EXAM:  Constitutional: NAD  HEENT: anicteric sclera, oropharynx clear, MMM  Neck: No JVD  Respiratory: CTAB, no wheezes, rales or rhonchi  Cardiovascular: S1, S2, RRR  Gastrointestinal: BS+, soft, NT/ND  Extremities: No cyanosis or clubbing. No peripheral edema  Neurological: A/O x 3, no focal deficits  Psychiatric: Normal mood, normal affect  : No CVA tenderness. No allred.   Skin: No rashes  Vascular Access:    LABS:  03-27    152<H>  |  119<H>  |  69<HH>  ----------------------------<  255<H>  5.1<H>   |  16<L>  |  1.9<H>    Ca    7.4<L>      27 Mar 2021 12:17  Mg     2.4     03-27    TPro  5.2<L>  /  Alb  2.1<L>  /  TBili  0.7  /  DBili      /  AST  77<H>  /  ALT  24  /  AlkPhos  297<H>  03-27    Creatinine Trend: 1.9 <--, 1.9 <--, 1.0 <--, 0.8 <--, 0.8 <--, 1.1 <--, 1.0 <--, 1.1 <--, 1.5 <--                        7.5    5.61  )-----------( 159      ( 27 Mar 2021 12:17 )             23.3     Urine Studies:              RADIOLOGY & ADDITIONAL STUDIES:                 NEPHROLOGY CONSULTATION NOTE    88 yo M with PMHx of HTN, HLD, Epilepsy ( on VPA and carbamazepine) , CVA with R sided deficits, DMII on Januvia presented with acute change in mental status per son on 315 . found to be covid positive, then transferred to [facility]. 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  Diabetes mellitus  HTN (hypertension)  Seizures  CVA (cerebral vascular accident) x 4  Acute subdural hematoma  History of total hip replacement, right  H/O hernia repair      Allergies:  Ambien (Other)  Dilantin (Unknown)  phenobarbital (Unknown)    Home Medications Reviewed  Hospital Medications:   MEDICATIONS  (STANDING):  ampicillin/sulbactam  IVPB 3 Gram(s) IV Intermittent every 12 hours  atorvastatin 20 milliGRAM(s) Oral at bedtime  carBAMazepine ER Capsule 200 milliGRAM(s) Oral two times a day  clopidogrel Tablet 75 milliGRAM(s) Oral daily  dexAMETHasone  Injectable 6 milliGRAM(s) IV Push daily  diVALproex  milliGRAM(s) Oral daily  enoxaparin Injectable 40 milliGRAM(s) SubCutaneous daily  glucagon  Injectable 1 milliGRAM(s) IntraMuscular once  insulin glargine Injectable (LANTUS) 9 Unit(s) SubCutaneous every morning  insulin lispro (ADMELOG) corrective regimen sliding scale   SubCutaneous three times a day before meals  levoFLOXacin IVPB 750 milliGRAM(s) IV Intermittent every 48 hours  lisinopril 5 milliGRAM(s) Oral daily  metoprolol tartrate 50 milliGRAM(s) Oral two times a day      SOCIAL HISTORY:  Denies ETOH,Smoking,   FAMILY HISTORY:        REVIEW OF SYSTEMS:  All other review of systems is negative unless indicated above.    VITALS:  T(F): 96.1 (03-28-21 @ 04:56), Max: 96.1 (03-28-21 @ 04:56)  HR: 94 (03-28-21 @ 05:20)  BP: 119/54 (03-28-21 @ 04:56)  RR: 18 (03-28-21 @ 04:56)  SpO2: 100% (03-28-21 @ 05:20)    03-27 @ 07:01  -  03-28 @ 07:00  --------------------------------------------------------  IN: 0 mL / OUT: 500 mL / NET: -500 mL          03-27-21 @ 07:01  -  03-28-21 @ 07:00  --------------------------------------------------------  IN: 0 mL / OUT: 500 mL / NET: -500 mL      I&O's Detail    27 Mar 2021 07:01  -  28 Mar 2021 07:00  --------------------------------------------------------  IN:  Total IN: 0 mL    OUT:    Indwelling Catheter - Urethral (mL): 500 mL  Total OUT: 500 mL    Total NET: -500 mL            PHYSICAL EXAM:  Constitutional: lethargic   Neck: No JVD  Respiratory: Crackles fine at base   Cardiovascular: S1, S2, RRR  Gastrointestinal: BS+, soft, NT/ND  Extremities: No cyanosis or clubbing. No peripheral edema  : allred.   Skin: No rashes  Vascular Access:    LABS:  03-27    152<H>  |  119<H>  |  69<HH>  ----------------------------<  255<H>  5.1<H>   |  16<L>  |  1.9<H>    Ca    7.4<L>      27 Mar 2021 12:17  Mg     2.4     03-27    TPro  5.2<L>  /  Alb  2.1<L>  /  TBili  0.7  /  DBili      /  AST  77<H>  /  ALT  24  /  AlkPhos  297<H>  03-27    Creatinine Trend: 1.9 <--, 1.9 <--, 1.0 <--, 0.8 <--, 0.8 <--, 1.1 <--, 1.0 <--, 1.1 <--, 1.5 <--                        7.5    5.61  )-----------( 159      ( 27 Mar 2021 12:17 )             23.3     Urine Studies:              RADIOLOGY & ADDITIONAL STUDIES:

## 2021-03-28 NOTE — PROGRESS NOTE ADULT - ASSESSMENT
89 yo M with PMHx of HTN, HLD, Epilepsy ( on VPA and carbamazepine) , CVA with R sided deficits presented with acute change in mental status per son. found to be covid positive, then transferred to [facility]    Patient transferred from [facility] for worsening hypoxemia on 3/20.   As per transfer note, at 12am vital check patient had SpO2 of 76-78% on 4L NC, oxygen increased to 25LNRB and then titrated down to 15LNRB. Kept patient on 15LNRB for 30mins. After 30mins began to slowly titrate down patient back to 5LNC, however during the process patient appeared more tachypneic and his SpO2 dropped to 89-90%.   Transferred back to [facility] for adequate management.     # Acute hypoxic respiratory failure due to COVID PNA  - Remdesivir 3/16 to 3/20  - Dexamethasone 3/16 to 3/25. May extend upto 3/27?  - S/p empiric cefepime (3/21-23; Procalcitonin : 0.43 3/21). But 3/24 Procalc back up to 1.16. CT shows air bronchograms besides the diffuse GGOs. Adding Unasyn, LVQ per Pulm on 3/26.   -D-dimer 5000 > LE Duplex neg.    3/24: 6L NC. comfortable breathing. Sleepy but arousable. Likely element of Covid encephalopathy. Clyde frequently. Get OOB to chair tomorrow and PT soon.   3/25: 4L NC. goes back to sleep after being aroused. PO intake dropping. Ate a little with son at bedside. Spoke coherently to son for a little while before going back to sleep. Made DNR/DNI  3/26: Overnight tachycardic transiently 138 bpm. Sats worsened to NRM. Still on NRM. CTA neg for PE but diffuse GGOs in almost 80% of parenchyma with some air bronchograms on ALLI. Adding Unasyn, LVQ.   3/27: Overnight again tachycardic episode of 130s transiently. Bipap overnight. Now back on NRM. Sleeps all day long. c/w Dexa, Unasyn, LVQ.   NG feeds ok per family for now. Increase to D5W @ 100 for persistent/worsening hypernatremia.   3/28: Moderate respi distress while on continuous bipap. Could not eat/ drink d/t bipap. Labs noted. On IVFs. Worsening prognosis discussed with son. Agreed for CMO. No more heroic measures or investigations. c/w meds required for comfort. Keep bipap on. Tomorrow after paliiative assessment - take off bipap and use morphine to prevent respi distress. Son would like to be here when that is done.       <OLD ISSUES>:  # metabolic encephalopathy likely Covid Encephalopathy.   - CT head and CTA head and neck did not show any acute stroke or occlusion.  - On Plavix  - ct head shows Absence of flow related contrast in the left cerebral hemisphere corresponding to a large area of encephalomalacia.   EEG - focal left hemispheric slowing. Valproate level low. Adjust dose? Unreliable intake.   Pending MRI, Neuro re-eval    #VITA, Hypernatremia:  D/t Poor po intake d/t covid encephalopathy. wakes to take his meds but otherwise poor appetite.   Now worsened to 152.   Increase to D5W @ 100. Monitor BMP. Nephro eval.  NG feeds ok per family. FW Flushes 100 pre, 200 post feeds Q6H .     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     # Recent Acute pyelonephritis due to Ecoli   - s/p Ceftriaxone 3/16 to 3/19  - Vantin 3/19 to 3/22       # HTN:  -C/w lisinopril and metoprolol    # Type II DM  - On insulin scale  - a1c 7.7,   -RApid response 3/24 night for fingerstick 11. Now only on lantus 9 QAM and SSI per Endocrine. Observe.    Dispo: Active as above . Prognosis guarded. DNR/dNI .

## 2021-03-29 NOTE — CONSULT NOTE ADULT - PROBLEM SELECTOR RECOMMENDATION 4
Pt restless r/t hypoxia and advanced illness. Has mittens on, will recommend low dose Haldol 0.5mg IV q 8 hrs PRN.

## 2021-03-29 NOTE — CONSULT NOTE ADULT - CONSULT REASON
GOC and symptom management
AHRF, COVID, BiPAP
ams
COVID pneumonia
fluctuating blood sugars
VITA/ hypernatremia

## 2021-03-29 NOTE — PHYSICAL THERAPY INITIAL EVALUATION ADULT - SPECIFY REASON(S)
Attempted to see pt. again this PM, pt. currently off unit. Will follow-up for PT Belinda.
Hold PT at this time patient desatting at rest as per KULWINDER Sotomayor patient just calmed down and is being placed on Bipap due to acute hypoxia . will f/u as appropriate
Pt. attempted to be seen for PT Eval. Pt. very lethargic, unable to arouse for longer than 1-2 seconds. PT Eval held, will continue to follow-up
Pt. continues on BiPap, not medically appropriate to participate in PT at this time. Please re-consult when medically stable and appropriate.
Pt. currently does not have activity orders. Will follow-up for PT Eval when activity orders placed.

## 2021-03-29 NOTE — CONSULT NOTE ADULT - PROBLEM SELECTOR RECOMMENDATION 2
Pt with severe covid respiratory failure. Poor prognosis, on BIPAP 80%, cannot be weened to NRB mask Pt with severe covid respiratory failure. Poor prognosis, on BIPAP 80%, cannot be weened to NRB mask.

## 2021-03-29 NOTE — CONSULT NOTE ADULT - CONVERSATION DETAILS
Palliative NP spoke to son Davy and pt's nephew Sylvester    Reviewed current diagnosis and treatment. All questions answered. Son got medical update from Dr Esparza yesterday. Pt has not improved, prognosis is very poor from COVID respiratory failure. Pt is DNR/DNI, MOL reviewed    Discussed option of ongoing medical care vs comfort measures only. Discussed process of BIPAP withdrawal and medications used for comfort. All questions answered.    Plan for Sylvester and Davy to see pt tonight and palliative BIPAP removal tomorrow - Edmund sepulveda will be there

## 2021-03-29 NOTE — PROGRESS NOTE ADULT - ASSESSMENT
88 yo M with PMHx of HTN, HLD, Epilepsy ( on VPA and carbamazepine) , CVA with R sided deficits, DMII on Januvia sp covid diagnosis now with VITA    ·	VITA most likely prerenal/ Hypernatremia/ hyperkalemia/ acidemia   ·	start IVF  d5W with 75 meq bicarb at 100 cc per hour  ·	check UA and urine lytes U osm   ·	CT scan no RP bleeding no hydro   ·	Hold LMWH / anemia and VITA / No ACE inh or ARB / follow repeat Hb please / CT scan noted   ·	dose antibx to GFR < 15 on levaquin / Unasyn for now   ·	allred / strict I and O   ·	check ABG   ·	no need for RRT     will follow

## 2021-03-29 NOTE — PROGRESS NOTE ADULT - SUBJECTIVE AND OBJECTIVE BOX
Hospital Day:  8d    Subjective:    Patient is a 89y old  Male who presents with a chief complaint of covid pna (26 Mar 2021 15:05)      Admitted to medicine for a primary diagnosis of     Past Medical Hx:   Deformity    Diabetes mellitus    HTN (hypertension)    Seizures    CVA (cerebral vascular accident)      Past Sx:  Acute subdural hematoma    History of total hip replacement, right    H/O hernia repair      Allergies:  Ambien (Other)  Dilantin (Unknown)  phenobarbital (Unknown)    Current Meds:   Standng Meds:  ampicillin/sulbactam  IVPB 3 Gram(s) IV Intermittent every 12 hours  atorvastatin 20 milliGRAM(s) Oral at bedtime  carBAMazepine ER Capsule 200 milliGRAM(s) Oral two times a day  clopidogrel Tablet 75 milliGRAM(s) Oral daily  dexAMETHasone  Injectable 6 milliGRAM(s) IV Push daily  dextrose 40% Gel 15 Gram(s) Oral once  dextrose 5%. 1000 milliLiter(s) (50 mL/Hr) IV Continuous <Continuous>  dextrose 5%. 1000 milliLiter(s) (100 mL/Hr) IV Continuous <Continuous>  dextrose 50% Injectable 50 milliLiter(s) IV Push once  dextrose 50% Injectable 25 Gram(s) IV Push once  dextrose 50% Injectable 12.5 Gram(s) IV Push once  dextrose 50% Injectable 25 Gram(s) IV Push once  glucagon  Injectable 1 milliGRAM(s) IntraMuscular once  insulin glargine Injectable (LANTUS) 9 Unit(s) SubCutaneous every morning  insulin lispro (ADMELOG) corrective regimen sliding scale   SubCutaneous three times a day before meals  iohexol 300 mG (iodine)/mL Oral Solution 30 milliLiter(s) Oral once  levoFLOXacin IVPB 750 milliGRAM(s) IV Intermittent every 48 hours  metoprolol tartrate 50 milliGRAM(s) Oral two times a day  pantoprazole  Injectable 40 milliGRAM(s) IV Push every 12 hours  valproate sodium IVPB 125 milliGRAM(s) IV Intermittent every 6 hours    PRN Meds:  acetaminophen   Tablet .. 650 milliGRAM(s) Oral every 6 hours PRN Temp greater or equal to 38C (100.4F), Mild Pain (1 - 3)    HOME MEDICATIONS:  atorvastatin 20 mg oral tablet: 1 tab(s) orally once a day  carBAMazepine 400 mg oral tablet, extended release:   clopidogrel 75 mg oral tablet: 1 tab(s) orally once a day  divalproex sodium 500 mg oral tablet, extended release:   Januvia 100 mg oral tablet:   lisinopril 5 mg oral tablet: 1 tab(s) orally once a day  metoprolol tartrate 50 mg oral tablet: 1 tab(s) orally 2 times a day      Vital Signs:   T(F): 97.2 (03-29-21 @ 05:10), Max: 99.8 (03-28-21 @ 21:00)  HR: 97 (03-29-21 @ 05:10) (77 - 118)  BP: 127/57 (03-29-21 @ 05:10) (115/58 - 128/62)  RR: 17 (03-29-21 @ 05:10) (17 - 27)  SpO2: 94% (03-29-21 @ 05:10) (92% - 100%)      03-28-21 @ 07:01  -  03-29-21 @ 07:00  --------------------------------------------------------  IN: 0 mL / OUT: 900 mL / NET: -900 mL        Physical Exam:   GENERAL: NAD  HEENT: NCAT  CHEST/LUNG: CTAB  HEART: Regular rate and rhythm; s1 s2 appreciated, No murmurs, rubs, or gallops  ABDOMEN: Soft, Nontender, Nondistended; Bowel sounds present  EXTREMITIES: No LE edema b/l  SKIN: no rashes, no new lesions  NERVOUS SYSTEM:  Alert & Oriented X3  LINES/CATHETERS:        Labs:                         5.8    5.23  )-----------( 179      ( 28 Mar 2021 07:16 )             18.2     Neutophil% 85.0, Lymphocyte% 12.6, Monocyte% 1.1, Bands% 1.1 03-28-21 @ 07:16    28 Mar 2021 07:16    148    |  114    |  68     ----------------------------<  167    4.2     |  17     |  1.9      Ca    7.2        28 Mar 2021 07:16  Mg     2.2       28 Mar 2021 07:16    TPro  5.3    /  Alb  2.0    /  TBili  0.8    /  DBili  x      /  AST  70     /  ALT  23     /  AlkPhos  337    28 Mar 2021 07:16              Troponin 0.04, CKMB --, CK -- 03-26-21 @ 12:09  Troponin 0.05, CKMB --, CK -- 03-26-21 @ 07:33                 Hospital Day:  8d    Subjective:    Patient is a 89y old  Male who presents with a chief complaint of covid pna. No acute overnight events. This morning, pt restless in bed, trying to pull bipap mask off. Unable to answer questions appropriately       Admitted to medicine for a primary diagnosis of acute hypoxic respiratory failure     Past Medical Hx:   Deformity    Diabetes mellitus    HTN (hypertension)    Seizures    CVA (cerebral vascular accident)      Past Sx:  Acute subdural hematoma    History of total hip replacement, right    H/O hernia repair      Allergies:  Ambien (Other)  Dilantin (Unknown)  phenobarbital (Unknown)    Current Meds:   Standng Meds:  ampicillin/sulbactam  IVPB 3 Gram(s) IV Intermittent every 12 hours  atorvastatin 20 milliGRAM(s) Oral at bedtime  carBAMazepine ER Capsule 200 milliGRAM(s) Oral two times a day  clopidogrel Tablet 75 milliGRAM(s) Oral daily  dexAMETHasone  Injectable 6 milliGRAM(s) IV Push daily  dextrose 40% Gel 15 Gram(s) Oral once  dextrose 5%. 1000 milliLiter(s) (50 mL/Hr) IV Continuous <Continuous>  dextrose 5%. 1000 milliLiter(s) (100 mL/Hr) IV Continuous <Continuous>  dextrose 50% Injectable 50 milliLiter(s) IV Push once  dextrose 50% Injectable 25 Gram(s) IV Push once  dextrose 50% Injectable 12.5 Gram(s) IV Push once  dextrose 50% Injectable 25 Gram(s) IV Push once  glucagon  Injectable 1 milliGRAM(s) IntraMuscular once  insulin glargine Injectable (LANTUS) 9 Unit(s) SubCutaneous every morning  insulin lispro (ADMELOG) corrective regimen sliding scale   SubCutaneous three times a day before meals  iohexol 300 mG (iodine)/mL Oral Solution 30 milliLiter(s) Oral once  levoFLOXacin IVPB 750 milliGRAM(s) IV Intermittent every 48 hours  metoprolol tartrate 50 milliGRAM(s) Oral two times a day  pantoprazole  Injectable 40 milliGRAM(s) IV Push every 12 hours  valproate sodium IVPB 125 milliGRAM(s) IV Intermittent every 6 hours    PRN Meds:  acetaminophen   Tablet .. 650 milliGRAM(s) Oral every 6 hours PRN Temp greater or equal to 38C (100.4F), Mild Pain (1 - 3)    HOME MEDICATIONS:  atorvastatin 20 mg oral tablet: 1 tab(s) orally once a day  carBAMazepine 400 mg oral tablet, extended release:   clopidogrel 75 mg oral tablet: 1 tab(s) orally once a day  divalproex sodium 500 mg oral tablet, extended release:   Januvia 100 mg oral tablet:   lisinopril 5 mg oral tablet: 1 tab(s) orally once a day  metoprolol tartrate 50 mg oral tablet: 1 tab(s) orally 2 times a day      Vital Signs:   T(F): 97.2 (03-29-21 @ 05:10), Max: 99.8 (03-28-21 @ 21:00)  HR: 97 (03-29-21 @ 05:10) (77 - 118)  BP: 127/57 (03-29-21 @ 05:10) (115/58 - 128/62)  RR: 17 (03-29-21 @ 05:10) (17 - 27)  SpO2: 94% (03-29-21 @ 05:10) (92% - 100%)      03-28-21 @ 07:01  -  03-29-21 @ 07:00  --------------------------------------------------------  IN: 0 mL / OUT: 900 mL / NET: -900 mL        Physical Exam:   GENERAL: appears uncomfortable, frail, unkempt. Had bipap  HEENT: NCAT  CHEST/LUNG: CTAB  HEART: Regular rate and rhythm; s1 s2 appreciated, No murmurs, rubs, or gallops  ABDOMEN: Soft, Nontender, Nondistended; Bowel sounds present  EXTREMITIES: No LE edema b/l  SKIN: bluish discoloration in b/l toes, multiple areas of ecchymosis is bl ue  NERVOUS SYSTEM:  Alert & Oriented X0  LINES/CATHETERS: allred peripheral IVs         Labs:                         5.8    5.23  )-----------( 179      ( 28 Mar 2021 07:16 )             18.2     Neutophil% 85.0, Lymphocyte% 12.6, Monocyte% 1.1, Bands% 1.1 03-28-21 @ 07:16    28 Mar 2021 07:16    148    |  114    |  68     ----------------------------<  167    4.2     |  17     |  1.9      Ca    7.2        28 Mar 2021 07:16  Mg     2.2       28 Mar 2021 07:16    TPro  5.3    /  Alb  2.0    /  TBili  0.8    /  DBili  x      /  AST  70     /  ALT  23     /  AlkPhos  337    28 Mar 2021 07:16              Troponin 0.04, CKMB --, CK -- 03-26-21 @ 12:09  Troponin 0.05, CKMB --, CK -- 03-26-21 @ 07:33

## 2021-03-29 NOTE — CONSULT NOTE ADULT - CONSULT REQUESTED DATE/TIME
26-Mar-2021 13:56
28-Mar-2021 08:48
21-Mar-2021 15:20
25-Mar-2021 11:09
29-Mar-2021
25-Mar-2021 20:12

## 2021-03-29 NOTE — PROGRESS NOTE ADULT - ATTENDING COMMENTS
89 yo M with PMHx of HTN, HLD, Epilepsy ( on VPA and carbamazepine) , CVA with R sided deficits presented with acute change in mental status per son. found to be covid positive, then transferred to [facility]    Patient transferred from [facility] for worsening hypoxemia on 3/20.   As per transfer note, at 12am vital check patient had SpO2 of 76-78% on 4L NC, oxygen increased to 25LNRB and then titrated down to 15LNRB. Kept patient on 15LNRB for 30mins. After 30mins began to slowly titrate down patient back to 5LNC, however during the process patient appeared more tachypneic and his SpO2 dropped to 89-90%.   Transferred back to [facility] for adequate management.     # Acute hypoxic respiratory failure due to COVID PNA  - Remdesivir 3/16 to 3/20  - Dexamethasone 3/16 to 3/25. May extend upto 3/27?  - S/p empiric cefepime (3/21-23; Procalcitonin : 0.43 3/21). But 3/24 Procalc back up to 1.16. CT shows air bronchograms besides the diffuse GGOs. Adding Unasyn, LVQ per Pulm on 3/26.   -D-dimer 5000 > LE Duplex neg.    3/24: 6L NC. comfortable breathing. Sleepy but arousable. Likely element of Covid encephalopathy. [place] frequently. Get OOB to chair tomorrow and PT soon.   3/25: 4L NC. goes back to sleep after being aroused. PO intake dropping. Ate a little with son at bedside. Spoke coherently to son for a little while before going back to sleep. Made DNR/DNI  3/26: Overnight tachycardic transiently 138 bpm. Sats worsened to NRM. Still on NRM. CTA neg for PE but diffuse GGOs in almost 80% of parenchyma with some air bronchograms on ALLI. Adding Unasyn, LVQ.   3/27: Overnight again tachycardic episode of 130s transiently. Bipap overnight. Now back on NRM. Sleeps all day long. c/w Dexa, Unasyn, LVQ.   NG feeds ok per family for now. Increase to D5W @ 100 for persistent/worsening hypernatremia.   3/28: Moderate respi distress while on continuous bipap. Could not eat/ drink d/t bipap. Labs noted. On IVFs. Worsening prognosis discussed with son. Agreed for CMO. No more heroic measures or investigations. c/w meds required for comfort. Keep bipap on. Tomorrow after paliiative assessment - take off bipap and use morphine to prevent respi distress. Son would like to be here when that is done.     3/29: Per Palliative's discussion with son, he is agreeable for comfort measures. PLan to remove bipap and start morphine gtt around 11 am on 3/30 after family is here at bedside.     <OLD ISSUES>:  # metabolic encephalopathy likely Covid Encephalopathy.   - CT head and CTA head and neck did not show any acute stroke or occlusion.  - On Plavix  - ct head shows Absence of flow related contrast in the left cerebral hemisphere corresponding to a large area of encephalomalacia.   EEG - focal left hemispheric slowing. Valproate level low. Adjust dose? Unreliable intake.   Pending MRI, Neuro re-eval    #VITA, Hypernatremia:  D/t Poor po intake d/t covid encephalopathy. wakes to take his meds but otherwise poor appetite.   Now worsened to 152.   Increase to D5W @ 100. Monitor BMP. Nephro eval.  NG feeds ok per family. FW Flushes 100 pre, 200 post feeds Q6H .     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     # Recent Acute pyelonephritis due to Ecoli   - s/p Ceftriaxone 3/16 to 3/19  - Vantin 3/19 to 3/22       # HTN:  -C/w lisinopril and metoprolol    # Type II DM  - On insulin scale  - a1c 7.7,   -RApid response 3/24 night for fingerstick 11. Now only on lantus 9 QAM and SSI per Endocrine. Observe.    Dispo: Active as above . Prognosis guarded. DNR/dNI . 88 yo M with PMHx of HTN, HLD, Epilepsy ( on VPA and carbamazepine) , CVA with R sided deficits presented with acute change in mental status per son. found to be covid positive, then transferred to [facility]    Patient transferred from [facility] for worsening hypoxemia on 3/20.   As per transfer note, at 12am vital check patient had SpO2 of 76-78% on 4L NC, oxygen increased to 25LNRB and then titrated down to 15LNRB. Kept patient on 15LNRB for 30mins. After 30mins began to slowly titrate down patient back to 5LNC, however during the process patient appeared more tachypneic and his SpO2 dropped to 89-90%.   Transferred back to [facility] for adequate management.     # Acute hypoxic respiratory failure due to COVID PNA  - Remdesivir 3/16 to 3/20  - Dexamethasone 3/16 to 3/25. May extend upto 3/27?  - S/p empiric cefepime (3/21-23; Procalcitonin : 0.43 3/21). But 3/24 Procalc back up to 1.16. CT shows air bronchograms besides the diffuse GGOs. Adding Unasyn, LVQ per Pulm on 3/26.   -D-dimer 5000 > LE Duplex neg.    3/24: 6L NC. comfortable breathing. Sleepy but arousable. Likely element of Covid encephalopathy. [place] frequently. Get OOB to chair tomorrow and PT soon.   3/25: 4L NC. goes back to sleep after being aroused. PO intake dropping. Ate a little with son at bedside. Spoke coherently to son for a little while before going back to sleep. Made DNR/DNI  3/26: Overnight tachycardic transiently 138 bpm. Sats worsened to NRM. Still on NRM. CTA neg for PE but diffuse GGOs in almost 80% of parenchyma with some air bronchograms on ALLI. Adding Unasyn, LVQ.   3/27: Overnight again tachycardic episode of 130s transiently. Bipap overnight. Now back on NRM. Sleeps all day long. c/w Dexa, Unasyn, LVQ.   NG feeds ok per family for now. Increase to D5W @ 100 for persistent/worsening hypernatremia.   3/28: Moderate respi distress while on continuous bipap. Could not eat/ drink d/t bipap. Labs noted. On IVFs. Worsening prognosis discussed with son. Agreed for CMO. No more heroic measures or investigations. c/w meds required for comfort. Keep bipap on. Tomorrow after paliiative assessment - take off bipap and use morphine to prevent respi distress. Son would like to be here when that is done.     3/29: We tried taking off Bipap and put on NRM. Desaturated instantly to 70s.   Per Palliative's discussion with son, he is agreeable for comfort measures. PLan to remove bipap and start morphine gtt around 11 am on 3/30 after family is here at bedside.     <OLD ISSUES>:  # metabolic encephalopathy likely Covid Encephalopathy.   - CT head and CTA head and neck did not show any acute stroke or occlusion.  - On Plavix  - ct head shows Absence of flow related contrast in the left cerebral hemisphere corresponding to a large area of encephalomalacia.   EEG - focal left hemispheric slowing. Valproate level low. Adjust dose? Unreliable intake.   Pending MRI, Neuro re-eval    #VITA, Hypernatremia:  D/t Poor po intake d/t covid encephalopathy. wakes to take his meds but otherwise poor appetite.   Now worsened to 152.   Increase to D5W @ 100. Monitor BMP. Nephro eval.  NG feeds ok per family. FW Flushes 100 pre, 200 post feeds Q6H .     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     # Recent Acute pyelonephritis due to Ecoli   - s/p Ceftriaxone 3/16 to 3/19  - Vantin 3/19 to 3/22       # HTN:  -C/w lisinopril and metoprolol    # Type II DM  - On insulin scale  - a1c 7.7,   -RApid response 3/24 night for fingerstick 11. Now only on lantus 9 QAM and SSI per Endocrine. Observe.    Dispo: Active as above . Prognosis guarded. DNR/dNI .

## 2021-03-29 NOTE — SWALLOW BEDSIDE ASSESSMENT ADULT - SLP PERTINENT HISTORY OF CURRENT PROBLEM
89 y/o M initially presented with AMS, found to be COVID+, and transferred to [facility]. Pt now transferred back to [facility] for worsening hypoxia/sepsis 2' COVID PNA. CXR (3/21/21) indicates increased bilateral opacities. PMHx of HTN, HLD, Epilepsy , CVA with R sided deficits. Rapid response 3/24 2' hypoglycemia, now fluctuating hypo/hyperglycemia- endocrinology c/s. Pt now w/ worsening mental status, likely 2' metabolic encephalopathy. CTH (-) for acute pathology, extensive left hemispheric encephalomalacia, table moderate parenchymal volume loss. Stable mild chronic microvascular ischemic changes involving the right cerebral hemisphere. Echo and MRI pending when stable.

## 2021-03-29 NOTE — PROGRESS NOTE ADULT - ASSESSMENT
88 yo M with PMHx of HTN, HLD, Epilepsy ( on VPA and carbamazepine) , CVA with R sided deficits presented with acute change in mental status per son. found to be covid positive, then transfered to [facility]    Patient transferred from [facility] for worsening hypoxemia on 3/20.   As per transfer note, at 12am vital check patient had SpO2 of 76-78% on 4L NC, oxygen increased to 25LNRB and then titrated down to 15LNRB. Kept patient on 15LNRB for 30mins. After 30mins began to slowly titrate down patient back to 5LNC, however during the process patient appeared more tachypneic and his SpO2 dropped to 89-90%.   Transferred back to [facility] for adequate management.     # Acute hypoxic respiratory failure due to COVID PNA  - Remdesivir 3/16 to 3/20  - Dexamethasone 3/16 to 3/25  - currently on bipap, keep pulse ox >88%, wean off o2 as tolerated  - Started empirically on cefepime on admission, Procalcitonin : 0.43 3/21, abx d/c'd monitor off abx  - tachypenic and tachycardia overnight 3/26 with associated stomach breathing. EKG shows sinus tach. d-dimer 5k LE duplex negative. afebrile Follow up am labs. well's score 6 consider CT chest PE protocol negative for PE, however air broncogram were seen, start unasyn and levaquin    # Acute anemia   - Hb 5.8 <- 7.5   - CT A/P no retroperitoneal hemorrhage     # Acute pyelonephritis due to Ecoli   - s/p Ceftriaxone 3/16 to 3/19  - Vantin 3/19 to 3/22e    #Altered Mental status >> likely due to metabolic encephalopathy , Doubt  stroke:   Improving.   - CT head and CTA head and neck did not show any acute stroke or occlusion.  - echo and MR head still pending for when stable from COVID-19 point of view  - On Plavix  - ct head shows Absence of flow related contrast in the left cerebral hemisphere corresponding to a large area of encephalomalacia.   - Follow up EEG, VPA and carbamazapine level and MR head  - poor po intake, evaluated by S&S  - now has hypernatremia and VITA likely secondary to poor po intake, start d5 iv hydration, avoid fluid overload, Follow up labs    # Hx of CVA with right hemiparesis/ Seizure d/o    - On Plavix, Statin .      # HTN:  -C/w lisinopril and metoprolol    # Type II DM  - On insulin lantus   - a1c 7.7,   - endo consult done, monitor fs    code statues: dnr/dni,  no escalation of care    89 yo M with PMHx of HTN, HLD, Epilepsy ( on VPA and carbamazepine) , CVA with R sided deficits presented with acute change in mental status per son. found to be covid positive, then transfered to [facility]    Patient transferred from [facility] for worsening hypoxemia on 3/20.   As per transfer note, at 12am vital check patient had SpO2 of 76-78% on 4L NC, oxygen increased to 25LNRB and then titrated down to 15LNRB. Kept patient on 15LNRB for 30mins. After 30mins began to slowly titrate down patient back to 5LNC, however during the process patient appeared more tachypneic and his SpO2 dropped to 89-90%.   Transferred back to [facility] for adequate management.     NO ESCALATION OF CARE     # Acute hypoxic respiratory failure due to COVID PNA  - Remdesivir 3/16 to 3/20  - Dexamethasone 3/16 to 3/25  - currently on bipap, keep pulse ox >88%, wean off o2 as tolerated  - Started empirically on cefepime on admission, Procalcitonin : 0.43 3/21, abx d/c'd monitor off abx  - tachypenic and tachycardia overnight 3/26 with associated stomach breathing. EKG shows sinus tach. d-dimer 5k LE duplex negative. afebrile Follow up am labs. well's score 6 consider CT chest PE protocol negative for PE, however air broncogram were seen, start unasyn and levaquin    # Acute anemia   - Hb 5.8 <- 7.5   - CT A/P no retroperitoneal hemorrhage   - No repeat cbc indicated    # Acute pyelonephritis due to Ecoli   - s/p Ceftriaxone 3/16 to 3/19  - Vantin 3/19 to 3/22    #Altered Mental status >> likely due to metabolic encephalopathy , Doubt  stroke  - CT head and CTA head and neck did not show any acute stroke or occlusion.  - ct head shows Absence of flow related contrast in the left cerebral hemisphere corresponding to a large area of encephalomalacia.   - echo and MR head still pending for when stable from COVID-19 point of view  - On Plavix  - No EEG, ECHO and MR head, as no escalation of care   - poor po intake, evaluated by S&S    # hypernatremia and VITA  - likely secondary to poor po intake  - S/p d5 iv hydration, avoid fluid overload, Follow up labs    # Hx of CVA with right hemiparesis/ Seizure d/o    - On Plavix, Statin     # HTN:  -C/w lisinopril and metoprolol    # Type II DM  - On insulin lantus   - a1c 7.7,   - endo consult done, monitor fs    code statues: dnr/dni,  no escalation of care    89 yo M with PMHx of HTN, HLD, Epilepsy ( on VPA and carbamazepine) , CVA with R sided deficits presented with acute change in mental status per son. found to be covid positive, then transfered to [facility]    Patient transferred from [facility] for worsening hypoxemia on 3/20.   As per transfer note, at 12am vital check patient had SpO2 of 76-78% on 4L NC, oxygen increased to 25LNRB and then titrated down to 15LNRB. Kept patient on 15LNRB for 30mins. After 30mins began to slowly titrate down patient back to 5LNC, however during the process patient appeared more tachypneic and his SpO2 dropped to 89-90%.   Transferred back to [facility] for adequate management.     NO ESCALATION OF CARE     # Acute hypoxic respiratory failure due to COVID PNA  - Remdesivir 3/16 to 3/20  - Dexamethasone 3/16 to 3/25  - currently on bipap, keep pulse ox >88%, wean off o2 as tolerated  - Started empirically on cefepime on admission, Procalcitonin : 0.43 3/21, abx d/c'd monitor off abx  - tachypenic and tachycardia overnight 3/26 with associated stomach breathing. EKG shows sinus tach. d-dimer 5k LE duplex negative. afebrile Follow up am labs. well's score 6 consider CT chest PE protocol negative for PE, however air broncogram were seen, start unasyn and levaquin    # Acute anemia   - Hb 5.8 <- 7.5   - CT A/P no retroperitoneal hemorrhage   - No repeat cbc indicated    # Acute pyelonephritis due to Ecoli   - s/p Ceftriaxone 3/16 to 3/19  - Vantin 3/19 to 3/22    #Altered Mental status >> likely due to metabolic encephalopathy , Doubt  stroke  - CT head and CTA head and neck did not show any acute stroke or occlusion.  - ct head shows Absence of flow related contrast in the left cerebral hemisphere corresponding to a large area of encephalomalacia.   - echo and MR head still pending for when stable from COVID-19 point of view  - On Plavix  - No EEG, ECHO and MR head, as no escalation of care   - poor po intake, evaluated by S&S  - Palliative to discuss CMO with family today     # hypernatremia and VITA  - likely secondary to poor po intake  - S/p d5 iv hydration. avoid fluid overload    # Hx of CVA with right hemiparesis/ Seizure d/o    - On Plavix, Statin     # HTN:  -C/w lisinopril and metoprolol    # Type II DM  - On insulin lantus   - a1c 7.7,   - endo consult done, monitor fs    code statues: dnr/dni,  no escalation of care

## 2021-03-29 NOTE — CONSULT NOTE ADULT - ATTENDING COMMENTS
Patient seen and examined as above.  Patient is on bipap, discussed with son. Palliative removal of bipap tomorrow.

## 2021-03-29 NOTE — CONSULT NOTE ADULT - ASSESSMENT
89yMale being evaluated for      MEDD (morphine equivalent daily dose):      See Recs below.    - DNR/DNI  -Palliative NP meeting w son today  - Palliative BIPAP withdrawal tomorrow 11am     Please call [phone] with questions or concerns 24/7.   We will continue to follow.    89yMale being evaluated for goals of care and symptom management. Pt is an 89 yr old who lived at home w 24/7 care giver secondary to mild debility. Pt was still ambulatory and alert and and oriented x 3. Pt now with advanced covid respiratory failure and is declining.    Palliative team called after attending MD gave son the grave prognosis to discuss comfort measures. Family meeting done, note above.       MEDD (morphine equivalent daily dose):      See Recs below.    - DNR/DNI  -Palliative NP meeting w son today  - Palliative BIPAP withdrawal tomorrow 11am   - case discussed w attending MD and resident     Please call [phone] with questions or concerns 24/7.   We will continue to follow.    89yMale being evaluated for goals of care and symptom management. Pt is an 89 yr old who lived at home w 24/7 care giver secondary to mild debility. Pt was still ambulatory and alert and and oriented x 3. Pt now with advanced covid respiratory failure and is declining.    Palliative team called after attending MD gave son the grave prognosis to discuss comfort measures. Family meeting done, note above.     MEDD (morphine equivalent daily dose):0    See Recs below.    - DNR/DNI  -Palliative NP meeting w son today  - Palliative BIPAP withdrawal tomorrow 11am   - case discussed w attending MD and resident     Please call [phone] with questions or concerns 24/7.   We will continue to follow.

## 2021-03-29 NOTE — PROGRESS NOTE ADULT - SUBJECTIVE AND OBJECTIVE BOX
Nephrology progress note    THIS IS AN INCOMPLETE NOTE . FULL NOTE TO FOLLOW SHORTLY    Patient is seen and examined, events over the last 24 h noted .    Allergies:  Ambien (Other)  Dilantin (Unknown)  phenobarbital (Unknown)    Hospital Medications:   MEDICATIONS  (STANDING):  ampicillin/sulbactam  IVPB 3 Gram(s) IV Intermittent every 12 hours  atorvastatin 20 milliGRAM(s) Oral at bedtime  carBAMazepine ER Capsule 200 milliGRAM(s) Oral two times a day  clopidogrel Tablet 75 milliGRAM(s) Oral daily  dexAMETHasone  Injectable 6 milliGRAM(s) IV Push daily  insulin glargine Injectable (LANTUS) 9 Unit(s) SubCutaneous every morning  insulin lispro (ADMELOG) corrective regimen sliding scale   SubCutaneous three times a day before meals  iohexol 300 mG (iodine)/mL Oral Solution 30 milliLiter(s) Oral once  levoFLOXacin IVPB 750 milliGRAM(s) IV Intermittent every 48 hours  metoprolol tartrate 50 milliGRAM(s) Oral two times a day  pantoprazole  Injectable 40 milliGRAM(s) IV Push every 12 hours  valproate sodium IVPB 125 milliGRAM(s) IV Intermittent every 6 hours        VITALS:  T(F): 97.2 (03-29-21 @ 05:10), Max: 99.8 (03-28-21 @ 21:00)  HR: 97 (03-29-21 @ 05:10)  BP: 127/57 (03-29-21 @ 05:10)  RR: 17 (03-29-21 @ 05:10)  SpO2: 94% (03-29-21 @ 05:10)  Wt(kg): --    03-27 @ 07:01  -  03-28 @ 07:00  --------------------------------------------------------  IN: 0 mL / OUT: 500 mL / NET: -500 mL    03-28 @ 07:01  -  03-29 @ 07:00  --------------------------------------------------------  IN: 0 mL / OUT: 900 mL / NET: -900 mL          PHYSICAL EXAM:  Constitutional: NAD  HEENT: anicteric sclera, oropharynx clear, MMM  Neck: No JVD  Respiratory: CTAB, no wheezes, rales or rhonchi  Cardiovascular: S1, S2, RRR  Gastrointestinal: BS+, soft, NT/ND  Extremities: No cyanosis or clubbing. No peripheral edema  :  No allred.   Skin: No rashes    LABS:  03-28    148<H>  |  114<H>  |  68<HH>  ----------------------------<  167<H>  4.2   |  17  |  1.9<H>    Ca    7.2<L>      28 Mar 2021 07:16  Mg     2.2     03-28    TPro  5.3<L>  /  Alb  2.0<L>  /  TBili  0.8  /  DBili      /  AST  70<H>  /  ALT  23  /  AlkPhos  337<H>  03-28                          5.8    5.23  )-----------( 179      ( 28 Mar 2021 07:16 )             18.2       Urine Studies:      RADIOLOGY & ADDITIONAL STUDIES:   Nephrology progress note  Patient is seen and examined, events over the last 24 h noted .  on BIPAP      Allergies:  Ambien (Other)  Dilantin (Unknown)  phenobarbital (Unknown)    Hospital Medications:   MEDICATIONS  (STANDING):  ampicillin/sulbactam  IVPB 3 Gram(s) IV Intermittent every 12 hours  atorvastatin 20 milliGRAM(s) Oral at bedtime  carBAMazepine ER Capsule 200 milliGRAM(s) Oral two times a day  clopidogrel Tablet 75 milliGRAM(s) Oral daily  dexAMETHasone  Injectable 6 milliGRAM(s) IV Push daily  insulin glargine Injectable (LANTUS) 9 Unit(s) SubCutaneous every morning  insulin lispro (ADMELOG) corrective regimen sliding scale   SubCutaneous three times a day before meals  iohexol 300 mG (iodine)/mL Oral Solution 30 milliLiter(s) Oral once  levoFLOXacin IVPB 750 milliGRAM(s) IV Intermittent every 48 hours  metoprolol tartrate 50 milliGRAM(s) Oral two times a day  pantoprazole  Injectable 40 milliGRAM(s) IV Push every 12 hours  valproate sodium IVPB 125 milliGRAM(s) IV Intermittent every 6 hours        VITALS:  T(F): 97.2 (03-29-21 @ 05:10), Max: 99.8 (03-28-21 @ 21:00)  HR: 97 (03-29-21 @ 05:10)  BP: 127/57 (03-29-21 @ 05:10)  RR: 17 (03-29-21 @ 05:10)  SpO2: 94% (03-29-21 @ 05:10)      03-27 @ 07:01  -  03-28 @ 07:00  --------------------------------------------------------  IN: 0 mL / OUT: 500 mL / NET: -500 mL    03-28 @ 07:01  -  03-29 @ 07:00  --------------------------------------------------------  IN: 0 mL / OUT: 900 mL / NET: -900 mL          PHYSICAL EXAM:  Constitutional: NAD  Neck: No JVD  Respiratory: CTAB   Cardiovascular: S1, S2, RRR  Gastrointestinal: BS+, soft, NT/ND  Extremities: No cyanosis or clubbing. No peripheral edema  Skin: No rashes    LABS:    03-28    148<H>  |  114<H>  |  68<HH>  ----------------------------<  167<H>  4.2   |  17  |  1.9<H>    Ca    7.2<L>      28 Mar 2021 07:16  Mg     2.2     03-28    TPro  5.3<L>  /  Alb  2.0<L>  /  TBili  0.8  /  DBili      /  AST  70<H>  /  ALT  23  /  AlkPhos  337<H>  03-28                          5.8    5.23  )-----------( 179      ( 28 Mar 2021 07:16 )             18.2     Hemoglobin: 5.8 g/dL (03-28 @ 07:16)  Hemoglobin: 7.5 g/dL (03-27 @ 12:17)  Hemoglobin: 8.4 g/dL (03-26 @ 07:33)        Urine Studies:      RADIOLOGY & ADDITIONAL STUDIES:  < from: CT Abdomen and Pelvis w/ IV Cont (03.28.21 @ 18:21) >    KIDNEYS: 1.9 cm left renal cyst. Symmetric renal enhancement. No hydronephrosis.    < end of copied text >  < from: CT Abdomen and Pelvis w/ IV Cont (03.28.21 @ 18:21) >    IMPRESSION:    No retroperitoneal hematoma.    Right colon wall thickening without significant pericolic fat stranding. Finding may be on the basis of underdistention, however cannot exclude right colitis.    Diffuse bibasilar lung airspace consolidative opacities. Finding is consistent with patient's history of Covid 19 infection.    < end of copied text >

## 2021-03-29 NOTE — CONSULT NOTE ADULT - SUBJECTIVE AND OBJECTIVE BOX
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    Diabetes mellitus    HTN (hypertension)    Seizures    CVA (cerebral vascular accident)  x 4    Acute subdural hematoma  With surgical intervention    History of total hip replacement, right    H/O hernia repair        FAMILY HISTORY:   Reviewed and found non contributory in mother or father    SOCIAL HISTORY:     ROS:    Unable to attain due to:                      Dyspnea (Marine 0-10): 0                       N/V (Y/N): No                             Secretions (Y/N) : No                                          Agitation(Y/N): No                              Pain (Y/N): No                                 -Provocation/Palliation: N/A  -Quality/Quantity: N/A  -Radiating: N/A  -Severity: No pain  -Timing/Frequency: N/A  -Impact on ADLs: N/A    General:  Denied  HEENT:    Denied  Neck:  Denied  CVS:  Denied  Resp:  Denied  GI:  Denied    :  Denied  Musc:  Denied  Neuro:  Denied  Psych:  Denied  Skin:  Denied  Lymph:  Denied    Allergies    Ambien (Other)  Dilantin (Unknown)  phenobarbital (Unknown)    Intolerances      Opiate Naive (Y/N):   -iStop reviewed (Y/N):   Ref#:              Medications:      MEDICATIONS  (STANDING):  ampicillin/sulbactam  IVPB 3 Gram(s) IV Intermittent every 12 hours  atorvastatin 20 milliGRAM(s) Oral at bedtime  carBAMazepine ER Capsule 200 milliGRAM(s) Oral two times a day  clopidogrel Tablet 75 milliGRAM(s) Oral daily  dexAMETHasone  Injectable 6 milliGRAM(s) IV Push daily  insulin glargine Injectable (LANTUS) 9 Unit(s) SubCutaneous every morning  insulin lispro (ADMELOG) corrective regimen sliding scale   SubCutaneous three times a day before meals  iohexol 300 mG (iodine)/mL Oral Solution 30 milliLiter(s) Oral once  levoFLOXacin IVPB 750 milliGRAM(s) IV Intermittent every 48 hours  metoprolol tartrate 50 milliGRAM(s) Oral two times a day  pantoprazole  Injectable 40 milliGRAM(s) IV Push every 12 hours  valproate sodium IVPB 125 milliGRAM(s) IV Intermittent every 6 hours    MEDICATIONS  (PRN):  acetaminophen   Tablet .. 650 milliGRAM(s) Oral every 6 hours PRN Temp greater or equal to 38C (100.4F), Mild Pain (1 - 3)  morphine  - Injectable 2 milliGRAM(s) IV Push every 4 hours PRN Moderate Pain (4 - 6)      Labs:    CBC:                        5.8    5.23  )-----------( 179      ( 28 Mar 2021 07:16 )             18.2     CMP:    03-28    148<H>  |  114<H>  |  68<HH>  ----------------------------<  167<H>  4.2   |  17  |  1.9<H>    Ca    7.2<L>      28 Mar 2021 07:16  Mg     2.2     03-28    TPro  5.3<L>  /  Alb  2.0<L>  /  TBili  0.8  /  DBili  x   /  AST  70<H>  /  ALT  23  /  AlkPhos  337<H>  03-28     Albumin, Serum: 2.0 g/dL (03-28-21 @ 07:16)             Imaging:  Reviewed    PEx:  T(C): 36.2 (03-29-21 @ 05:10), Max: 37.7 (03-28-21 @ 21:00)  HR: 97 (03-29-21 @ 05:10) (77 - 107)  BP: 127/57 (03-29-21 @ 05:10) (115/58 - 128/62)  RR: 17 (03-29-21 @ 05:10) (17 - 18)  SpO2: 94% (03-29-21 @ 05:10) (92% - 94%)  Wt(kg): --  Daily     Daily     General: AAOx3    found in bed in NAD  HEENT:  NCAT PERRL EOMI Non icteric MOM  Neck: Supple no masses  CVS: RR S1S2 No M/G/R  Resp: Unlabored Non tachypneic No increased WOB  GI:  Soft NT ND BS+  :  Voiding / Stevens / PrimaFit  Musc: No C/C/E    Neuro: Follows commands No focal deficits  Psych: Calm Pleasant  Skin: Non jaundiced   Lymph: Normal    Preadmit Karnofsky:  %           Current Karnofsky:     %  http://www.npcrc.org/files/news/karnofsky_performance_scale.pdf   http://www.npcrc.org/files/news/palliative_performance_scale_PPSv2.pdf  Cachexia (Y/N):   BMI:    Advanced Directives:     Full Code     DNR/DNI     MOLST     HCP     DPOA     Living Will     Decision maker: The patient is able to participate in complex medical decision making conversations.   Legal surrogate:    GOALS OF CARE DISCUSSION       Palliative care info/counseling provided	           Family meeting       Advanced Directives addressed please see Advance Care Planning Note	           See previous Palliative Medicine Note       Documentation of GOC: 	    REFERRALS	        Palliative Med        Unit SW/Case Mgmt              Speech/Swallow       Patient/Family Support       Massage Therapy       Music Therapy       Pet Therapy       Hospice       Nutrition       Dietician       PT/OT DAVY ESPITIA          MRN-[id number]              HPI: Shortness of Breath       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    Diabetes mellitus    HTN (hypertension)    Seizures    CVA (cerebral vascular accident)  x 4    Acute subdural hematoma  With surgical intervention    History of total hip replacement, right    H/O hernia repair        FAMILY HISTORY:   Reviewed and found non contributory in mother or father    SOCIAL HISTORY:     ROS:    Unable to attain due to:                      Dyspnea (Marine 0-10): YES                                               Secretions:  No                                          Agitation: Yes                             Pain : yes                              -Provocation/Palliation: movement   -Quality/Quantity: sharp   -Timing/Frequency: daily   -Impact on ADLs: pt is bedbound     Allergies:     Ambien (Other)  Dilantin (Unknown)  phenobarbital (Unknown)    Intolerances:       Opiate Naive: Yes   -iStop reviewed (Y/N):   Ref#:            #: [id number]    There are no results for the search terms that you entered.    Medications:      MEDICATIONS  (STANDING):  ampicillin/sulbactam  IVPB 3 Gram(s) IV Intermittent every 12 hours  atorvastatin 20 milliGRAM(s) Oral at bedtime  carBAMazepine ER Capsule 200 milliGRAM(s) Oral two times a day  clopidogrel Tablet 75 milliGRAM(s) Oral daily  dexAMETHasone  Injectable 6 milliGRAM(s) IV Push daily  insulin glargine Injectable (LANTUS) 9 Unit(s) SubCutaneous every morning  insulin lispro (ADMELOG) corrective regimen sliding scale   SubCutaneous three times a day before meals  iohexol 300 mG (iodine)/mL Oral Solution 30 milliLiter(s) Oral once  levoFLOXacin IVPB 750 milliGRAM(s) IV Intermittent every 48 hours  metoprolol tartrate 50 milliGRAM(s) Oral two times a day  pantoprazole  Injectable 40 milliGRAM(s) IV Push every 12 hours  valproate sodium IVPB 125 milliGRAM(s) IV Intermittent every 6 hours    MEDICATIONS  (PRN):  acetaminophen   Tablet .. 650 milliGRAM(s) Oral every 6 hours PRN Temp greater or equal to 38C (100.4F), Mild Pain (1 - 3)  morphine  - Injectable 2 milliGRAM(s) IV Push every 4 hours PRN Moderate Pain (4 - 6)      Labs:    CBC:                        5.8    5.23  )-----------( 179      ( 28 Mar 2021 07:16 )             18.2     CMP:    03-28    148<H>  |  114<H>  |  68<HH>  ----------------------------<  167<H>  4.2   |  17  |  1.9<H>    Ca    7.2<L>      28 Mar 2021 07:16  Mg     2.2     03-28    TPro  5.3<L>  /  Alb  2.0<L>  /  TBili  0.8  /  DBili  x   /  AST  70<H>  /  ALT  23  /  AlkPhos  337<H>  03-28     Albumin, Serum: 2.0 g/dL (03-28-21 @ 07:16)             Imaging:  Reviewed    PEx:  T(C): 36.2 (03-29-21 @ 05:10), Max: 37.7 (03-28-21 @ 21:00)  HR: 97 (03-29-21 @ 05:10) (77 - 107)  BP: 127/57 (03-29-21 @ 05:10) (115/58 - 128/62)  RR: 17 (03-29-21 @ 05:10) (17 - 18)  SpO2: 94% (03-29-21 @ 05:10) (92% - 94%)  Wt(kg): --    General: AAOx1  found in bed appears to have advanced illness   HEENT:  NCAT PERRL EOMI Non icteric sclera   Neck: Supple no masses  CVS: RR S1S2 No M/G/R  Resp: Unlabored Non tachypneic No increased SOB   GI:  Soft NT ND BS+  :   Stevens   Musc: No C/C/E    Neuro: Follows commands No focal deficits  Psych: appears anxious   Skin: Non jaundiced   Lymph: Normal    Preadmit Karnofsky:  %           Current Karnofsky:     %  http://www.npcrc.org/files/news/karnofsky_performance_scale.pdf   http://www.npcrc.org/files/news/palliative_performance_scale_PPSv2.pdf  Cachexia (Y/N):   BMI:    Advanced Directives:  DNR/DNI    Decision maker: The patient is not able to participate in complex medical decision making conversations.   Legal surrogate: Davy Espitia son    GOALS OF CARE DISCUSSION       Palliative care info/counseling provided	           Family meeting       Advanced Directives addressed please see Advance Care Planning Note	           See previous Palliative Medicine Note       Documentation of GOC: 	    REFERRALS	        Palliative Med        Unit SW/Case Mgmt              Speech/Swallow       Patient/Family Support       Massage Therapy       Music Therapy       Pet Therapy       Hospice       Nutrition       Dietician       PT/OT DAVY ESPITIA          MRN-[id number]              HPI: Shortness of Breath       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    Diabetes mellitus    HTN (hypertension)    Seizures    CVA (cerebral vascular accident)  x 4    Acute subdural hematoma  With surgical intervention    History of total hip replacement, right    H/O hernia repair        FAMILY HISTORY:   Reviewed and found non contributory in mother or father    SOCIAL HISTORY:     ROS:    Unable to attain due to:                      Dyspnea (Marine 0-10): YES                                               Secretions:  No                                          Agitation: Yes                             Pain : yes                              -Provocation/Palliation: movement   -Quality/Quantity: sharp   -Timing/Frequency: daily   -Impact on ADLs: pt is bedbound     Allergies:     Ambien (Other)  Dilantin (Unknown)  phenobarbital (Unknown)    Intolerances:       Opiate Naive: Yes   -iStop reviewed (Y/N):   Ref#:            #: [id number]    There are no results for the search terms that you entered.    Medications:      MEDICATIONS  (STANDING):  ampicillin/sulbactam  IVPB 3 Gram(s) IV Intermittent every 12 hours  atorvastatin 20 milliGRAM(s) Oral at bedtime  carBAMazepine ER Capsule 200 milliGRAM(s) Oral two times a day  clopidogrel Tablet 75 milliGRAM(s) Oral daily  dexAMETHasone  Injectable 6 milliGRAM(s) IV Push daily  insulin glargine Injectable (LANTUS) 9 Unit(s) SubCutaneous every morning  insulin lispro (ADMELOG) corrective regimen sliding scale   SubCutaneous three times a day before meals  iohexol 300 mG (iodine)/mL Oral Solution 30 milliLiter(s) Oral once  levoFLOXacin IVPB 750 milliGRAM(s) IV Intermittent every 48 hours  metoprolol tartrate 50 milliGRAM(s) Oral two times a day  pantoprazole  Injectable 40 milliGRAM(s) IV Push every 12 hours  valproate sodium IVPB 125 milliGRAM(s) IV Intermittent every 6 hours    MEDICATIONS  (PRN):  acetaminophen   Tablet .. 650 milliGRAM(s) Oral every 6 hours PRN Temp greater or equal to 38C (100.4F), Mild Pain (1 - 3)  morphine  - Injectable 2 milliGRAM(s) IV Push every 4 hours PRN Moderate Pain (4 - 6)      Labs:    CBC:                        5.8    5.23  )-----------( 179      ( 28 Mar 2021 07:16 )             18.2     CMP:    03-28    148<H>  |  114<H>  |  68<HH>  ----------------------------<  167<H>  4.2   |  17  |  1.9<H>    Ca    7.2<L>      28 Mar 2021 07:16  Mg     2.2     03-28    TPro  5.3<L>  /  Alb  2.0<L>  /  TBili  0.8  /  DBili  x   /  AST  70<H>  /  ALT  23  /  AlkPhos  337<H>  03-28     Albumin, Serum: 2.0 g/dL (03-28-21 @ 07:16)             Imaging:  Reviewed    PEx:  T(C): 36.2 (03-29-21 @ 05:10), Max: 37.7 (03-28-21 @ 21:00)  HR: 97 (03-29-21 @ 05:10) (77 - 107)  BP: 127/57 (03-29-21 @ 05:10) (115/58 - 128/62)  RR: 17 (03-29-21 @ 05:10) (17 - 18)  SpO2: 94% (03-29-21 @ 05:10) (92% - 94%)  Wt(kg): --    General: AAOx1  found in bed appears to have advanced illness   HEENT:  NCAT PERRL EOMI Non icteric sclera   Neck: Supple no masses  CVS: RR S1S2 No M/G/R  Resp: Unlabored Non tachypneic No increased SOB   GI:  Soft NT ND BS+  :   Stevens   Musc: No C/C/E    Neuro: Follows commands No focal deficits  Psych: appears anxious   Skin: cyanosis noted, toes blue   Lymph: Normal    Preadmit Karnofsky:  %           Current Karnofsky:     %  http://www.npcrc.org/files/news/karnofsky_performance_scale.pdf   http://www.npcrc.org/files/news/palliative_performance_scale_PPSv2.pdf  Cachexia (Y/N):   BMI:    Advanced Directives:  DNR/DNI    Decision maker: The patient is not able to participate in complex medical decision making conversations.   Legal surrogate: Davy Espitia son    GOALS OF CARE DISCUSSION       Palliative care info/counseling provided	           Family meeting       Advanced Directives addressed please see Advance Care Planning Note	           See previous Palliative Medicine Note       Documentation of GOC: 	Time spent advance care planning 45 minutes    REFERRALS	        Palliative Med        Unit SW/Case Mgmt              Speech/Swallow       Patient/Family Support       Massage Therapy       Music Therapy       Pet Therapy       Hospice       Nutrition       Dietician       PT/OT DAVY ESPITIA          MRN-[id number]              CC: Dyspnea     HPI: Shortness of Breath    89 yo M with PMHx of HTN, HLD, Epilepsy ( on VPA and carbamazepine) , CVA with R sided deficits presented with acute change in mental status per son. Sun antedates that about two hours prior to arrival, the patient was perfectly fine and discussing stocks with his son. Suddenly became obtunded. The son did not notice any dysarthria, aphasia, or worsened weakness. On arrival pt was febrile to 103 F and hypoxic at 80% on room air and on NRB saturates only 91-92.  Called the house and spoke to the aide who said that the patient did not have any temperate at home and was doing wel until Monday when his po intake decreased. The patient has family visiting him all the time and they went to a dinner in [place] over the weekend as well.     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    Diabetes mellitus    HTN (hypertension)    Seizures    CVA (cerebral vascular accident)  x 4    Acute subdural hematoma  With surgical intervention    History of total hip replacement, right    H/O hernia repair      FAMILY HISTORY:   Reviewed and found non contributory in mother or father    SOCIAL HISTORY:     ROS:    Unable to attain due to:                      Dyspnea (Marine 0-10): YES                                               Secretions:  No                                          Agitation: Yes                             Pain : yes                              -Provocation/Palliation: movement   -Quality/Quantity: sharp   -Timing/Frequency: daily   -Impact on ADLs: pt is bedbound     Allergies:     Ambien (Other)  Dilantin (Unknown)  phenobarbital (Unknown)    Intolerances:       Opiate Naive: Yes   -iStop reviewed (Y/N):   Ref#:            #: [id number]    There are no results for the search terms that you entered.    Medications:      MEDICATIONS  (STANDING):  ampicillin/sulbactam  IVPB 3 Gram(s) IV Intermittent every 12 hours  atorvastatin 20 milliGRAM(s) Oral at bedtime  carBAMazepine ER Capsule 200 milliGRAM(s) Oral two times a day  clopidogrel Tablet 75 milliGRAM(s) Oral daily  dexAMETHasone  Injectable 6 milliGRAM(s) IV Push daily  insulin glargine Injectable (LANTUS) 9 Unit(s) SubCutaneous every morning  insulin lispro (ADMELOG) corrective regimen sliding scale   SubCutaneous three times a day before meals  iohexol 300 mG (iodine)/mL Oral Solution 30 milliLiter(s) Oral once  levoFLOXacin IVPB 750 milliGRAM(s) IV Intermittent every 48 hours  metoprolol tartrate 50 milliGRAM(s) Oral two times a day  pantoprazole  Injectable 40 milliGRAM(s) IV Push every 12 hours  valproate sodium IVPB 125 milliGRAM(s) IV Intermittent every 6 hours    MEDICATIONS  (PRN):  acetaminophen   Tablet .. 650 milliGRAM(s) Oral every 6 hours PRN Temp greater or equal to 38C (100.4F), Mild Pain (1 - 3)  morphine  - Injectable 2 milliGRAM(s) IV Push every 4 hours PRN Moderate Pain (4 - 6)      Labs:    CBC:                        5.8    5.23  )-----------( 179      ( 28 Mar 2021 07:16 )             18.2     CMP:    03-28    148<H>  |  114<H>  |  68<HH>  ----------------------------<  167<H>  4.2   |  17  |  1.9<H>    Ca    7.2<L>      28 Mar 2021 07:16  Mg     2.2     03-28    TPro  5.3<L>  /  Alb  2.0<L>  /  TBili  0.8  /  DBili  x   /  AST  70<H>  /  ALT  23  /  AlkPhos  337<H>  03-28     Albumin, Serum: 2.0 g/dL (03-28-21 @ 07:16)       Imaging:  Reviewed  Ct A/p:  No retroperitoneal hematoma.    Right colon wall thickening without significant pericolic fat stranding. Finding may be on the basis of underdistention, however cannot exclude right colitis.    Diffuse bibasilar lung airspace consolidative opacities. Finding is consistent with patient's history of Covid 19 infection.      PEx:  T(C): 36.2 (03-29-21 @ 05:10), Max: 37.7 (03-28-21 @ 21:00)  HR: 97 (03-29-21 @ 05:10) (77 - 107)  BP: 127/57 (03-29-21 @ 05:10) (115/58 - 128/62)  RR: 17 (03-29-21 @ 05:10) (17 - 18)  SpO2: 94% (03-29-21 @ 05:10) (92% - 94%)  Wt(kg): --    General: AAOx1  found in bed appears to have advanced illness   HEENT:  NCAT PERRL EOMI Non icteric sclera   Neck: Supple no masses  CVS: RR S1S2 No M/G/R  Resp: Unlabored Non tachypneic No increased SOB   GI:  Soft NT ND BS+  :   Stevens   Musc: No C/C/E    Neuro: Follows commands No focal deficits  Psych: appears anxious   Skin: cyanosis noted, toes blue   Lymph: Normal    Preadmit Karnofsky:  %           Current Karnofsky:     %20  http://www.npcrc.org/files/news/karnofsky_performance_scale.pdf   http://www.npcrc.org/files/news/palliative_performance_scale_PPSv2.pdf  Cachexia (Y/N):   BMI:    Advanced Directives:  DNR/DNI    Decision maker: The patient is not able to participate in complex medical decision making conversations.   Legal surrogate: Davy Espitia son    GOALS OF CARE DISCUSSION       Palliative care info/counseling provided	           Family meeting       Advanced Directives addressed please see Advance Care Planning Note	           See previous Palliative Medicine Note       Documentation of GOC: 	Time spent advance care planning 46 minutes    REFERRALS	        Palliative Med        Unit SW/Case Mgmt              Speech/Swallow       Patient/Family Support       Massage Therapy       Music Therapy       Pet Therapy       Hospice       Nutrition       Dietician       PT/OT

## 2021-03-30 NOTE — PROGRESS NOTE ADULT - ASSESSMENT
Impression:  89 yo M with PMHx of HTN, HLD, Epilepsy ( on VPA and carbamazepine) , CVA with R sided deficits presented with acute change in mental status per son. Found to be covid positive, then transfered to [facility]. Patient transferred from [facility] for worsening hypoxemia on 3/20. Transferred back to [facility] for adequate management.   Treated for acute hypoxic respiratory failure due to COVID PNA.  Hospital course complicated by acute pyelonephritis due to Ecoli. CT head and CTA head and neck did not show any acute stroke or occlusion. Neuro consult called for altered mental status. as per son, about two hours prior to arrival, the patient was perfectly fine and discussing stocks with his son. Suddenly became obtunded. The son did not notice any dysarthria, aphasia, or worsened weakness      #AMS likely multifactorial secondary to covid encephalopathy vs  toxic/metabolic encephalopathy vs underlying seizures vs steroid psychosis  - CT head 3/15 - negative   - CT angio - Absence of flow related contrast in the left cerebral hemisphere corresponding to a large area of encephalomalacia.  - Routine EEG was abnormal shows right hemispheric sharp transients and rare sharps that are probably epileptiform  - valproic acid level is subtherapeutic ( ) currently 8.0   - carbamazepine level is therapeutic ( 4-12) currently 6.9  - Metabolic panel significant for na 148, BUN 68 cr 1.9   - ABG showing respiratory alkalosis with metabolic compensation  - currently on dexamethasone 6 mg IV since 3/21  - MR head was never performed because of respiratory status   - upon review of chart, patient has missed oral doses of carbamazepine since 3/27 due to BIPAP requirement   - patient valproate oral was switch to IV on 3/28  - Impression:  89 yo M with PMHx of HTN, HLD, Epilepsy ( on VPA and carbamazepine) , CVA with R sided deficits presented with acute change in mental status per son. Found to be covid positive, then transfered to [facility]. Patient transferred from [facility] for worsening hypoxemia on 3/20. Transferred back to [facility] for adequate management.   Treated for acute hypoxic respiratory failure due to COVID PNA.  Hospital course complicated by acute pyelonephritis due to Ecoli. CT head and CTA head and neck did not show any acute stroke or occlusion. Neuro consult called for altered mental status. as per son, about two hours prior to arrival, the patient was perfectly fine and discussing stocks with his son. Suddenly became obtunded. The son did not notice any dysarthria, aphasia, or worsened weakness      #AMS likely multifactorial secondary to covid encephalopathy vs  toxic/metabolic encephalopathy vs underlying seizures vs steroid psychosis  - CT head 3/15 - negative   - CT angio - Absence of flow related contrast in the left cerebral hemisphere corresponding to a large area of encephalomalacia.  - Routine EEG was abnormal shows right hemispheric sharp transients and rare sharps that are probably epileptiform  - valproic acid level is subtherapeutic ( ) currently 8.0   - carbamazepine level is therapeutic ( 4-12) currently 6.9  - Metabolic panel significant for na 148, BUN 68 cr 1.9   - ABG showing respiratory alkalosis with metabolic compensation  - currently on dexamethasone 6 mg IV since 3/21  - MR head was never performed because of respiratory status   - upon review of chart, patient has missed oral doses of carbamazepine since 3/27 due to BIPAP requirement   - patient valproate 500 mg oral was switch to 125 mg IV q6 on 3/28  - can increase valproate 250 mg q6  Impression:  88 yo M with PMHx of HTN, HLD, Epilepsy ( on VPA and carbamazepine) , CVA with R sided deficits presented with acute change in mental status per son. Found to be covid positive, then transfered to [facility]. Patient transferred from [facility] for worsening hypoxemia on 3/20. Transferred back to [facility] for adequate management.   Treated for acute hypoxic respiratory failure due to COVID PNA.  Hospital course complicated by acute pyelonephritis due to Ecoli. CT head and CTA head and neck did not show any acute stroke or occlusion. Neuro consult called for altered mental status. as per son, about two hours prior to arrival, the patient was perfectly fine and discussing stocks with his son. Suddenly became obtunded. The son did not notice any dysarthria, aphasia, or worsened weakness      #AMS likely multifactorial secondary to covid encephalopathy vs  toxic/metabolic encephalopathy vs underlying seizures vs steroid psychosis  - CT head 3/15 - negative   - CT angio - Absence of flow related contrast in the left cerebral hemisphere corresponding to a large area of encephalomalacia.  - Routine EEG was abnormal shows right hemispheric sharp transients and rare sharps that are probably epileptiform  - valproic acid level is subtherapeutic ( ) currently 8.0   - carbamazepine level is therapeutic ( 4-12) currently 6.9  - Metabolic panel significant for na 148, BUN 68 cr 1.9   - ABG showing respiratory alkalosis with metabolic compensation  - currently on dexamethasone 6 mg IV since 3/21  - MR head was never performed because of respiratory status   - upon review of chart, patient has missed oral doses of carbamazepine since 3/27 due to BIPAP requirement   - patient depakote 500 mg oral was switch to valproate 125 mg IV q6 on 3/28  - can increase valproate 250 mg q8 Impression:  89 yo M with PMHx of HTN, HLD, Epilepsy ( on VPA and carbamazepine) , CVA with R sided deficits presented with acute change in mental status per son. Found to be covid positive, then transfered to [facility]. Patient transferred from [facility] for worsening hypoxemia on 3/20. Transferred back to [facility] for adequate management.   Treated for acute hypoxic respiratory failure due to COVID PNA.  Hospital course complicated by acute pyelonephritis due to Ecoli. CT head and CTA head and neck did not show any acute stroke or occlusion. Neuro consult called for altered mental status. as per son, about two hours prior to arrival, the patient was perfectly fine and discussing stocks with his son. Suddenly became obtunded. The son did not notice any dysarthria, aphasia, or worsened weakness    #AMS likely multifactorial secondary to covid encephalopathy vs  toxic/metabolic encephalopathy vs underlying seizures vs steroid psychosis  - CT head 3/15 - negative   - CT angio - Absence of flow related contrast in the left cerebral hemisphere corresponding to a large area of encephalomalacia.  - Routine EEG was abnormal shows right hemispheric sharp transients and rare sharps that are probably epileptiform  - valproic acid level is subtherapeutic ( ) currently 8.0   - carbamazepine level is therapeutic ( 4-12) currently 6.9  - Metabolic panel significant for na 148, BUN 68 cr 1.9   - ABG showing respiratory alkalosis with metabolic compensation  - currently on dexamethasone 6 mg IV since 3/21  - MR head was never performed because of respiratory status   - upon review of chart, patient has missed oral doses of carbamazepine since 3/27 due to BIPAP requirement   - patient depakote 500 mg oral was switch to valproate 125 mg IV q6 on 3/28  - can increase valproate 250 mg q8  - continue comfort measures  - call back as needed

## 2021-03-30 NOTE — SWALLOW BEDSIDE ASSESSMENT ADULT - SWALLOW EVAL: DIAGNOSIS
Clinical signs of mod oral dysphagia with regular solids, mild oral dysphagia with mechanical soft and pharyngeal dysphagia with thin liquids is acute and likely related to new onset of COVID PNA. + toleration for regular solids, mechanical soft, and nectar thick liquids without overt s/s of aspiration/penetration.
pt lethargic, unable to maintain arousal, not appropriate for PO trials at this time.
pt not appropriate for PO trials 2' pt on continuous bipap. Refer to palliative for further recs if pt made comfort measures.
+overt s/s of aspiration/penetration for thin liquids; +toleration for nectar thick, puree, and mechanical soft w/o overt s/s of aspiration/penetration
pt not appropriate for PO trials 2' pt on continuous bipap.

## 2021-03-30 NOTE — SWALLOW BEDSIDE ASSESSMENT ADULT - SLP GENERAL OBSERVATIONS
Pt received laying in bed, continuous bipap in place. Pt not appropriate for PO trials.
Pt received lethargic, unable to sustain arousal for po trials. +4L o2 NC.
Pt received in bed awake alert w/o c/o pain. +6L NC.
Pt received laying in bed, continuous bipap in place. Pt not appropriate for PO trials.
Pt. received at bedside, awake, alert, min verbal output, follows commands, HFNC o2 60% 50L/min.

## 2021-03-30 NOTE — SWALLOW BEDSIDE ASSESSMENT ADULT - SWALLOW EVAL: CURRENT DIET
dysphagia 3 w/ nectar-thick liquids
Dysphagia 2, mechanical soft w/ honey thick liquids.

## 2021-03-30 NOTE — PROGRESS NOTE ADULT - ASSESSMENT
88 yo M with PMHx of HTN, HLD, Epilepsy ( on VPA and carbamazepine) , CVA with R sided deficits presented with acute change in mental status per son. found to be covid positive, then transfered to [facility]    Patient transferred from [facility] for worsening hypoxemia on 3/20.   As per transfer note, at 12am vital check patient had SpO2 of 76-78% on 4L NC, oxygen increased to 25LNRB and then titrated down to 15LNRB. Kept patient on 15LNRB for 30mins. After 30mins began to slowly titrate down patient back to 5LNC, however during the process patient appeared more tachypneic and his SpO2 dropped to 89-90%.   Transferred back to [facility] for adequate management.     NO ESCALATION OF CARE     # Acute hypoxic respiratory failure due to COVID PNA  - Remdesivir 3/16 to 3/20  - Dexamethasone 3/16 to 3/25  - currently on bipap, keep pulse ox >88%, wean off o2 as tolerated  - Started empirically on cefepime on admission, Procalcitonin : 0.43 3/21, abx d/c'd monitor off abx  - tachypenic and tachycardia overnight 3/26 with associated stomach breathing. EKG shows sinus tach. d-dimer 5k LE duplex negative. afebrile Follow up am labs. well's score 6 consider CT chest PE protocol negative for PE, however air broncogram were seen, start unasyn and levaquin    # Acute anemia   - Hb 5.8 <- 7.5   - CT A/P no retroperitoneal hemorrhage   - No repeat cbc indicated    # Acute pyelonephritis due to Ecoli   - s/p Ceftriaxone 3/16 to 3/19  - Vantin 3/19 to 3/22    #Altered Mental status >> likely due to metabolic encephalopathy , Doubt  stroke  - CT head and CTA head and neck did not show any acute stroke or occlusion.  - ct head shows Absence of flow related contrast in the left cerebral hemisphere corresponding to a large area of encephalomalacia.   - echo and MR head still pending for when stable from COVID-19 point of view  - On Plavix  - No EEG, ECHO and MR head, as no escalation of care   - poor po intake, evaluated by S&S  - Palliative to discuss CMO with family today     # hypernatremia and VITA  - likely secondary to poor po intake  - S/p d5 iv hydration. avoid fluid overload    # Hx of CVA with right hemiparesis/ Seizure d/o    - On Plavix, Statin     # HTN:  -C/w lisinopril and metoprolol    # Type II DM  - On insulin lantus   - a1c 7.7,   - endo consult done, monitor fs    code statues: dnr/dni,  no escalation of care  89 yo M with PMHx of HTN, HLD, Epilepsy (on VPA and carbamazepine) , CVA with R sided deficits presented with acute change in mental status per son. found to be covid positive, then transfered to [facility]    Patient transferred from [facility] for worsening hypoxemia on 3/20.   As per transfer note, at 12am vital check patient had SpO2 of 76-78% on 4L NC, oxygen increased to 25LNRB and then titrated down to 15LNRB. Kept patient on 15LNRB for 30mins. After 30mins began to slowly titrate down patient back to 5LNC, however during the process patient appeared more tachypneic and his SpO2 dropped to 89-90%.   Transferred back to [facility] for adequate management.     NO ESCALATION OF CARE (SEE PALLIATIVE NOTE)    # Acute hypoxic respiratory failure due to COVID PNA  - Remdesivir 3/16 to 3/20  - Dexamethasone 3/16 to 3/25  - currently on bipap, unable to wean off   - Started empirically on cefepime on admission, Procalcitonin : 0.43 3/21, abx d/c'd monitor off abx  - tachypenic and tachycardia overnight 3/26 with associated stomach breathing. EKG shows sinus tach. d-dimer 5k LE duplex negative. afebrile Follow up am labs. well's score 6 consider CT chest PE protocol negative for PE, however air broncogram were seen, start unasyn and levaquin    # Acute anemia   - Hb 5.8 <- 7.5   - CT A/P no retroperitoneal hemorrhage   - No repeat cbc indicated    # Acute pyelonephritis due to Ecoli   - s/p Ceftriaxone 3/16 to 3/19  - Vantin 3/19 to 3/22    #Altered Mental status >> likely due to metabolic encephalopathy , Doubt  stroke  - CT head and CTA head and neck did not show any acute stroke or occlusion.  - ct head shows Absence of flow related contrast in the left cerebral hemisphere corresponding to a large area of encephalomalacia.   - echo and MR head still pending for when stable from COVID-19 point of view  - On Plavix  - No EEG, ECHO and MR head, as no escalation of care   - poor po intake, evaluated by S&S  - Palliative to discuss CMO with family today     # hypernatremia and VITA  - likely secondary to poor po intake  - S/p d5 iv hydration. avoid fluid overload    # Hx of CVA with right hemiparesis/ Seizure d/o    - On Plavix, Statin     # HTN:  -C/w lisinopril and metoprolol    # Type II DM  - On insulin lantus   - a1c 7.7,   - endo consult done, monitor fs    code statues: dnr/dni,  no escalation of care  88 yo M with PMHx of HTN, HLD, Epilepsy (on VPA and carbamazepine) , CVA with R sided deficits presented with acute change in mental status per son. found to be covid positive, then transfered to [facility]    Patient transferred from [facility] for worsening hypoxemia on 3/20.   As per transfer note, at 12am vital check patient had SpO2 of 76-78% on 4L NC, oxygen increased to 25LNRB and then titrated down to 15LNRB. Kept patient on 15LNRB for 30mins. After 30mins began to slowly titrate down patient back to 5LNC, however during the process patient appeared more tachypneic and his SpO2 dropped to 89-90%.   Transferred back to [facility] for adequate management.     NO ESCALATION OF CARE (SEE PALLIATIVE NOTE)    # Acute hypoxic respiratory failure due to COVID PNA  - Remdesivir 3/16 to 3/20  - Dexamethasone 3/16 to 3/25  - currently on bipap, unable to wean off   - Family wanted to terminally withdrawal bipap today, but decided to c/w bipap for now as pt more alert today  - tachypenic and tachycardia overnight 3/26 with associated stomach breathing. EKG shows sinus tach. d-dimer 5k LE duplex negative. CT chest PE protocol negative for PE, however, air broncograms were seen, started unasyn and levaquin    # Acute anemia   - Hb 5.8 <- 7.5   - CT A/P no retroperitoneal hemorrhage   - Family would like repeat labs today     # Acute pyelonephritis due to Ecoli   - s/p Ceftriaxone 3/16 to 3/19 and Vantin 3/19 to 3/22  - c/w allred     #Altered Mental status >> likely due to metabolic encephalopathy , Doubt  stroke  - CTH and CTA head and neck did not show any acute stroke or occlusion.  - CTH did show absence of flow related contrast in the left cerebral hemisphere corresponding to a large area of encephalomalacia.   - No EEG, Echo and MR head given poor prognosis/no escalation of care   - C/w Plavix  - C/w Haldol, morphine, valproic acid, carbatrol   - poor po intake, started PPN today, per nutrition support   - Palliative c/s appreciated    # hypernatremia and VITA  - likely secondary to poor po intake  - S/p d5 iv hydration. avoid fluid overload  - F/u bmp 16:00 today     # Hx of CVA with right hemiparesis/ Seizure d/o    - On Plavix, Statin     # HTN:  -C/w lisinopril and metoprolol    # Type II DM  - On insulin lantus   - a1c 7.7,   - endo consult done, monitor fs    DVT ppx: none  GI ppx: protonix   Diet: ppn   Activity: IAT  Dispo: acute   code status: dnr/dni, no escalation of care. Family agrees to PPN and labs today. Modified MOLST in chart

## 2021-03-30 NOTE — PROGRESS NOTE ADULT - ASSESSMENT
89yMale being evaluated for goals of care and symptom management. Pt appears comfortable today on BIPAP. Pt is responsive. Answers simple questions only. But still only a+o x 1. He does know his son and family. Pt on 80% FIO2 on BIPAP. Unable to ween to lower settings or NRB    Palliative NP met with pt son and attending MD see [facility] note    MEDD (morphine equivalent daily dose):      See Recs below.    - DNR/DNI, limited medical interventions  - No pressors, no hemodialysis, no PEG/NGT  - Will allow labs and PPN  - if pt declines will opt for comfort care  - continue BIPAP for now    Please call [phone] with questions or concerns 24/7.   We will continue to follow.    Problem/Recommendation - 1:  Problem: Palliative care by specialist. Recommendation: DNR/DNI. Palliative NP met with son, see [facility] conversation again today 3/30/21, see above. No pressors, no hemodialysis, no PEG/NGT. trial of PPN and ongoing current level of care     Problem/Recommendation - 2:  ·  Problem: COVID-19.  Recommendation: Pt with severe covid respiratory failure. Poor prognosis, on BIPAP 80%, cannot be weened to NRB mask.Steroids ongoing, primary team management      Problem/Recommendation - 3:  ·  Problem: Dyspnea.  Recommendation: Severe dyspnea, morphine PRN being given with BIPAP. Morphine effective, will monitor for worsening symptoms.     Problem/Recommendation - 4:  ·  Problem: Restlessness and agitation.  Recommendation: Pt restless r/t hypoxia and advanced illness. Has mittens on, will recommend low dose Haldol 0.5mg IV q 8 hrs PRN. Less agitated today.

## 2021-03-30 NOTE — PROGRESS NOTE ADULT - SUBJECTIVE AND OBJECTIVE BOX
Neurology Follow up     Patient is a 89y old  Male who presents with a chief complaint of COVID pneumonia (25 Mar 2021 11:08)      HPI:  88 yo M with PMHx of HTN, HLD, Epilepsy ( on VPA and carbamazepine) , CVA with R sided deficits presented with acute change in mental status per son. Found to be covid positive, then transfered to [facility]. Patient transferred from [facility] for worsening hypoxemia on 3/20. Transferred back to [facility] for adequate management.   Treated for acute hypoxic respiratory failure due to COVID PNA.  Hospital course complicated by acute pyelonephritis due to Ecoli. CT head and CTA head and neck did not show any acute stroke or occlusion. Neuro consult called for altered mental status.    Interval Events:    Patient seen and examined today. Patient mental status still remains altered. He is AOx0 with agitation controlled with haldol prn. As per chart review, patient family is planning on BIPAP withdrawal today and initiating comfort care.     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    Diabetes mellitus    HTN (hypertension)    Seizures    CVA (cerebral vascular accident)  x 4    Acute subdural hematoma  With surgical intervention    History of total hip replacement, right    H/O hernia repair        FAMILY HISTORY:      Social History: (-) x 3    Allergies    Ambien (Other)  Dilantin (Unknown)  phenobarbital (Unknown)    MEDICATIONS  (STANDING):    ampicillin/sulbactam  IVPB 3 Gram(s) IV Intermittent every 12 hours  atorvastatin 20 milliGRAM(s) Oral at bedtime  carBAMazepine ER Capsule 200 milliGRAM(s) Oral two times a day  clopidogrel Tablet 75 milliGRAM(s) Oral daily  dexAMETHasone  Injectable 6 milliGRAM(s) IV Push daily  insulin glargine Injectable (LANTUS) 9 Unit(s) SubCutaneous every morning  insulin lispro (ADMELOG) corrective regimen sliding scale   SubCutaneous three times a day before meals  iohexol 300 mG (iodine)/mL Oral Solution 30 milliLiter(s) Oral once  levoFLOXacin IVPB 750 milliGRAM(s) IV Intermittent every 48 hours  metoprolol tartrate 50 milliGRAM(s) Oral two times a day  pantoprazole  Injectable 40 milliGRAM(s) IV Push every 12 hours  valproate sodium IVPB 125 milliGRAM(s) IV Intermittent every 6 hours    MEDICATIONS  (PRN):  acetaminophen   Tablet .. 650 milliGRAM(s) Oral every 6 hours PRN Temp greater or equal to 38C (100.4F), Mild Pain (1 - 3)  morphine  - Injectable 2 milliGRAM(s) IV Push every 4 hours PRN Moderate Pain (4 - 6)      Vital Signs Last 24 Hrs    T(C): 36.3 (30 Mar 2021 05:46), Max: 36.4 (29 Mar 2021 20:52)  T(F): 97.4 (30 Mar 2021 05:46), Max: 97.5 (29 Mar 2021 20:52)  HR: 115 (30 Mar 2021 07:47) (92 - 117)  BP: 118/58 (30 Mar 2021 07:47) (91/50 - 125/58)  RR: 18 (30 Mar 2021 05:46) (18 - 25)  SpO2: 98% (30 Mar 2021 07:47) (97% - 100%)      Examination:  General:  ill appearing man in distress  Cognitive/Language:  The patient is oriented to self not place or time. Patient awake spontaneously. Speaks sentences at times and nondysarthric. Mildly hypophonic.     Eyes: limited exam due to mental status.  Face:  no facial asymmetry.    Formal Muscle Strength Testing: mental status limiting exam. 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  Sensory examination: limited by ams but withdraws bilateral LE to peripheral noxious stimuli and LUE>RUE  Cerebellum:  limited by ams    Labs:     Ammonia, Serum: 49 umol/L (03.28.21 @ 07:16) Comprehensive Metabolic Panel (03.28.21 @ 07:16)   Sodium, Serum: 148 mmol/L   Potassium, Serum: 4.2 mmol/L   Chloride, Serum: 114 mmol/L   Carbon Dioxide, Serum: 17 mmol/L   Anion Gap, Serum: 17 mmol/L   Blood Urea Nitrogen, Serum: 68: Critical value: mg/dL   Creatinine, Serum: 1.9 mg/dL   Glucose, Serum: 167 mg/dL   Calcium, Total Serum: 7.2 mg/dL   Protein Total, Serum: 5.3 g/dL   Albumin, Serum: 2.0 g/dL   Bilirubin Total, Serum: 0.8 mg/dL   Alkaline Phosphatase, Serum: 337 U/L   Aspartate Aminotransferase (AST/SGOT): 70 U/L   Alanine Aminotransferase (ALT/SGPT): 23 U/L     eGFR if African American: 35 mL/min/1.73M2 Complete Blood Count + Automated Diff (03.28.21 @ 07:16)   WBC Count: 5.23 K/uL   RBC Count: 1.83 M/uL   Hemoglobin: 5.8: This result has been called to DR. GORDILLO by Meredith Munguia on 03 28 2021 at 0909, and has been read back. g/dL   Hematocrit: 18.2: This result has been called to DR. GORDILLO by Meredith Munguia on 03 28 2021 at 0909, and has been read back. %   Mean Cell Volume: 99.5 fL   Mean Cell Hemoglobin: 31.7 pg   Mean Cell Hemoglobin Conc: 31.9 g/dL   Red Cell Distrib Width: 14.4 %   Platelet Count - Automated: 179 K/uL   Auto Neutrophil #: 4.44 K/uL   Auto Lymphocyte #: 0.66 K/uL   Auto Monocyte #: 0.06 K/uL   Auto Eosinophil #: 0.01 K/uL   Auto Basophil #: 0.00 K/uL   Auto Neutrophil %: 85.0: Differential percentages must be correlated with absolute numbers for   clinical significance. %   Auto Lymphocyte %: 12.6 %   Auto Monocyte %: 1.1 %   Auto Eosinophil %: 0.2 %   Auto Basophil %: 0.0 %   Auto Immature Granulocyte %: 1.1 %   Nucleated RBC: 0 /100 WBCs     Blood Gas Profile - Arterial (03.26.21 @ 09:59)   pH, Arterial: 7.44   pCO2, Arterial: 29 mmHg   pO2, Arterial: 129 mmHg   HCO3, Arterial: 20 mmoL/L   Base Excess, Arterial: -3.6 mmoL/L   Oxygen Saturation, Arterial: 98 %     Valproic Acid Level, Serum: 8.0 ug/mL   (03.27.21 @ 12:17) Carbamazepine Level, Serum: 6.9 ug/mL (03.27.21 @ 12:17)     Procalcitonin, Serum: 1.16 (03-24)  Procalcitonin, Serum: 0.51 (03-22)  Procalcitonin, Serum: 0.43 (03-21)  Procalcitonin, Serum: 1.78 (03-17)    C-Reactive Protein, Serum: 301 (03-24)  C-Reactive Protein, Serum: 363 (03-22)  C-Reactive Protein, Serum: 283 (03-21)  C-Reactive Protein, Serum: 296 (03-17)    Ferritin, Serum: 882 (03-21)  Ferritin, Serum: 750 (03-17)      D-Dimer Assay, Quantitative: 5119 (03-24)  D-Dimer Assay, Quantitative: 646 (03-22)  D-Dimer Assay, Quantitative: 400 (03-21)        Neuroimaging:    < from: EEG (03.25.21 @ 17:20) >  EPILEPTIFORM ACTIVITY  Small number of right hemispheric sharp transients and rare sharps that are probably epileptiform in nature    < end of copied text >  < from: EEG (03.25.21 @ 17:20) >  IMPRESSIONS  This is an abnormal routine EEG due to the presence of  1.-Focal and generalized slowing as described above  2.-Abnormal interictal activity as described above    < end of copied text >    NCHCT:   < from: CT Brain Stroke Protocol (03.15.21 @ 21:00) >    IMPRESSION:    No evidence of acute intracranial hemorrhage or acute territorial infarct. Stable exam since February 22, 2016.    < end of copied text >  < from: CT Brain Stroke Protocol (03.15.21 @ 21:00) >        03-25-21 @ 20:12  < from: CT Angio Neck w/ IV Cont (03.15.21 @ 23:43) >  IMPRESSION:    Absence of flow related contrast in the left cerebral hemisphere corresponding to a large area of encephalomalacia.         Neurology Follow up     Patient is a 89y old  Male who presents with a chief complaint of COVID pneumonia (25 Mar 2021 11:08)      HPI:  88 yo M with PMHx of HTN, HLD, Epilepsy ( on VPA and carbamazepine) , CVA with R sided deficits presented with acute change in mental status per son. Found to be covid positive, then transfered to [facility]. Patient transferred from [facility] for worsening hypoxemia on 3/20. Transferred back to [facility] for adequate management.   Treated for acute hypoxic respiratory failure due to COVID PNA.  Hospital course complicated by acute pyelonephritis due to Ecoli. CT head and CTA head and neck did not show any acute stroke or occlusion. Neuro consult called for altered mental status.    Interval Events:    Patient seen and examined today. Patient mental status still remains altered. He is AOx0 with agitation controlled with haldol prn. As per chart review, patient family is planning on BIPAP withdrawal today and initiating comfort care.     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    Diabetes mellitus    HTN (hypertension)    Seizures    CVA (cerebral vascular accident)  x 4    Acute subdural hematoma  With surgical intervention    History of total hip replacement, right    H/O hernia repair        FAMILY HISTORY:      Social History: (-) x 3    Allergies    Ambien (Other)  Dilantin (Unknown)  phenobarbital (Unknown)    MEDICATIONS  (STANDING):    ampicillin/sulbactam  IVPB 3 Gram(s) IV Intermittent every 12 hours  atorvastatin 20 milliGRAM(s) Oral at bedtime  carBAMazepine ER Capsule 200 milliGRAM(s) Oral two times a day  clopidogrel Tablet 75 milliGRAM(s) Oral daily  dexAMETHasone  Injectable 6 milliGRAM(s) IV Push daily  insulin glargine Injectable (LANTUS) 9 Unit(s) SubCutaneous every morning  insulin lispro (ADMELOG) corrective regimen sliding scale   SubCutaneous three times a day before meals  iohexol 300 mG (iodine)/mL Oral Solution 30 milliLiter(s) Oral once  levoFLOXacin IVPB 750 milliGRAM(s) IV Intermittent every 48 hours  metoprolol tartrate 50 milliGRAM(s) Oral two times a day  pantoprazole  Injectable 40 milliGRAM(s) IV Push every 12 hours  valproate sodium IVPB 125 milliGRAM(s) IV Intermittent every 6 hours    MEDICATIONS  (PRN):  acetaminophen   Tablet .. 650 milliGRAM(s) Oral every 6 hours PRN Temp greater or equal to 38C (100.4F), Mild Pain (1 - 3)  morphine  - Injectable 2 milliGRAM(s) IV Push every 4 hours PRN Moderate Pain (4 - 6)      Vital Signs Last 24 Hrs    T(C): 36.3 (30 Mar 2021 05:46), Max: 36.4 (29 Mar 2021 20:52)  T(F): 97.4 (30 Mar 2021 05:46), Max: 97.5 (29 Mar 2021 20:52)  HR: 115 (30 Mar 2021 07:47) (92 - 117)  BP: 118/58 (30 Mar 2021 07:47) (91/50 - 125/58)  RR: 18 (30 Mar 2021 05:46) (18 - 25)  SpO2: 98% (30 Mar 2021 07:47) (97% - 100%)      Examination:    General:  Appearance is consistent with chronologic age.  No abnormal facies.  Gross skin survey within normal limits.    Cognitive/Language:  The patient is oriented to self not place or time. Patient awake spontaneously. Speaks sentences at times and nondysarthric. Mildly hypophonic.     Eyes: limited exam due to mental status.  Face:  no facial asymmetry.    Formal Muscle Strength Testing: mental status limiting exam. 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  Sensory examination: limited by ams but withdraws bilateral LE to peripheral noxious stimuli and LUE>RUE  Cerebellum:  limited by ams      Labs:     Ammonia, Serum: 49 umol/L (03.28.21 @ 07:16) Comprehensive Metabolic Panel (03.28.21 @ 07:16)   Sodium, Serum: 148 mmol/L   Potassium, Serum: 4.2 mmol/L   Chloride, Serum: 114 mmol/L   Carbon Dioxide, Serum: 17 mmol/L   Anion Gap, Serum: 17 mmol/L   Blood Urea Nitrogen, Serum: 68: Critical value: mg/dL   Creatinine, Serum: 1.9 mg/dL   Glucose, Serum: 167 mg/dL   Calcium, Total Serum: 7.2 mg/dL   Protein Total, Serum: 5.3 g/dL   Albumin, Serum: 2.0 g/dL   Bilirubin Total, Serum: 0.8 mg/dL   Alkaline Phosphatase, Serum: 337 U/L   Aspartate Aminotransferase (AST/SGOT): 70 U/L   Alanine Aminotransferase (ALT/SGPT): 23 U/L     eGFR if African American: 35 mL/min/1.73M2 Complete Blood Count + Automated Diff (03.28.21 @ 07:16)   WBC Count: 5.23 K/uL   RBC Count: 1.83 M/uL   Hemoglobin: 5.8: This result has been called to DR. GORDILLO by Meredith Munguia on 03 28 2021 at 0909, and has been read back. g/dL   Hematocrit: 18.2: This result has been called to DR. GORDILLO by Meredith Munguia on 03 28 2021 at 0909, and has been read back. %   Mean Cell Volume: 99.5 fL   Mean Cell Hemoglobin: 31.7 pg   Mean Cell Hemoglobin Conc: 31.9 g/dL   Red Cell Distrib Width: 14.4 %   Platelet Count - Automated: 179 K/uL   Auto Neutrophil #: 4.44 K/uL   Auto Lymphocyte #: 0.66 K/uL   Auto Monocyte #: 0.06 K/uL   Auto Eosinophil #: 0.01 K/uL   Auto Basophil #: 0.00 K/uL   Auto Neutrophil %: 85.0: Differential percentages must be correlated with absolute numbers for   clinical significance. %   Auto Lymphocyte %: 12.6 %   Auto Monocyte %: 1.1 %   Auto Eosinophil %: 0.2 %   Auto Basophil %: 0.0 %   Auto Immature Granulocyte %: 1.1 %   Nucleated RBC: 0 /100 WBCs     Blood Gas Profile - Arterial (03.26.21 @ 09:59)   pH, Arterial: 7.44   pCO2, Arterial: 29 mmHg   pO2, Arterial: 129 mmHg   HCO3, Arterial: 20 mmoL/L   Base Excess, Arterial: -3.6 mmoL/L   Oxygen Saturation, Arterial: 98 %     Valproic Acid Level, Serum: 8.0 ug/mL   (03.27.21 @ 12:17) Carbamazepine Level, Serum: 6.9 ug/mL (03.27.21 @ 12:17)     Procalcitonin, Serum: 1.16 (03-24)  Procalcitonin, Serum: 0.51 (03-22)  Procalcitonin, Serum: 0.43 (03-21)  Procalcitonin, Serum: 1.78 (03-17)    C-Reactive Protein, Serum: 301 (03-24)  C-Reactive Protein, Serum: 363 (03-22)  C-Reactive Protein, Serum: 283 (03-21)  C-Reactive Protein, Serum: 296 (03-17)    Ferritin, Serum: 882 (03-21)  Ferritin, Serum: 750 (03-17)      D-Dimer Assay, Quantitative: 5119 (03-24)  D-Dimer Assay, Quantitative: 646 (03-22)  D-Dimer Assay, Quantitative: 400 (03-21)        Neuroimaging:    < from: EEG (03.25.21 @ 17:20) >  EPILEPTIFORM ACTIVITY  Small number of right hemispheric sharp transients and rare sharps that are probably epileptiform in nature    < end of copied text >  < from: EEG (03.25.21 @ 17:20) >  IMPRESSIONS  This is an abnormal routine EEG due to the presence of  1.-Focal and generalized slowing as described above  2.-Abnormal interictal activity as described above    < end of copied text >    NCHCT:   < from: CT Brain Stroke Protocol (03.15.21 @ 21:00) >    IMPRESSION:    No evidence of acute intracranial hemorrhage or acute territorial infarct. Stable exam since February 22, 2016.    < end of copied text >  < from: CT Brain Stroke Protocol (03.15.21 @ 21:00) >        03-25-21 @ 20:12  < from: CT Angio Neck w/ IV Cont (03.15.21 @ 23:43) >  IMPRESSION:    Absence of flow related contrast in the left cerebral hemisphere corresponding to a large area of encephalomalacia.         Neurology Follow up     Patient is a 89y old  Male who presents with a chief complaint of COVID pneumonia (25 Mar 2021 11:08)    HPI:  88 yo M with PMHx of HTN, HLD, Epilepsy ( on VPA and carbamazepine) , CVA with R sided deficits presented with acute change in mental status per son. Found to be covid positive, then transfered to [facility]. Patient transferred from [facility] for worsening hypoxemia on 3/20. Transferred back to [facility] for adequate management.   Treated for acute hypoxic respiratory failure due to COVID PNA.  Hospital course complicated by acute pyelonephritis due to Ecoli. CT head and CTA head and neck did not show any acute stroke or occlusion. Neuro consult called for altered mental status.    Interval Events:    Patient seen and examined today. Patient mental status still remains altered. He is AOx0 with agitation controlled with haldol prn. As per chart review, patient family is planning on BIPAP withdrawal today and initiating comfort care.     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    Diabetes mellitus    HTN (hypertension)    Seizures    CVA (cerebral vascular accident)  x 4    Acute subdural hematoma  With surgical intervention    History of total hip replacement, right    H/O hernia repair        FAMILY HISTORY:      Social History: (-) x 3    Allergies    Ambien (Other)  Dilantin (Unknown)  phenobarbital (Unknown)    MEDICATIONS  (STANDING):    ampicillin/sulbactam  IVPB 3 Gram(s) IV Intermittent every 12 hours  atorvastatin 20 milliGRAM(s) Oral at bedtime  carBAMazepine ER Capsule 200 milliGRAM(s) Oral two times a day  clopidogrel Tablet 75 milliGRAM(s) Oral daily  dexAMETHasone  Injectable 6 milliGRAM(s) IV Push daily  insulin glargine Injectable (LANTUS) 9 Unit(s) SubCutaneous every morning  insulin lispro (ADMELOG) corrective regimen sliding scale   SubCutaneous three times a day before meals  iohexol 300 mG (iodine)/mL Oral Solution 30 milliLiter(s) Oral once  levoFLOXacin IVPB 750 milliGRAM(s) IV Intermittent every 48 hours  metoprolol tartrate 50 milliGRAM(s) Oral two times a day  pantoprazole  Injectable 40 milliGRAM(s) IV Push every 12 hours  valproate sodium IVPB 125 milliGRAM(s) IV Intermittent every 6 hours    MEDICATIONS  (PRN):  acetaminophen   Tablet .. 650 milliGRAM(s) Oral every 6 hours PRN Temp greater or equal to 38C (100.4F), Mild Pain (1 - 3)  morphine  - Injectable 2 milliGRAM(s) IV Push every 4 hours PRN Moderate Pain (4 - 6)      Vital Signs Last 24 Hrs    T(C): 36.3 (30 Mar 2021 05:46), Max: 36.4 (29 Mar 2021 20:52)  T(F): 97.4 (30 Mar 2021 05:46), Max: 97.5 (29 Mar 2021 20:52)  HR: 115 (30 Mar 2021 07:47) (92 - 117)  BP: 118/58 (30 Mar 2021 07:47) (91/50 - 125/58)  RR: 18 (30 Mar 2021 05:46) (18 - 25)  SpO2: 98% (30 Mar 2021 07:47) (97% - 100%)      Examination:    General:  Appearance is consistent with chronologic age.  No abnormal facies.  Gross skin survey within normal limits.    Cognitive/Language:  The patient is oriented to self not place or time. Patient awake spontaneously. Speaks sentences at times and nondysarthric. Mildly hypophonic.     Eyes: limited exam due to mental status.  Face:  no facial asymmetry.    Formal Muscle Strength Testing: mental status limiting exam. 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  Sensory examination: limited by ams but withdraws bilateral LE to peripheral noxious stimuli and LUE>RUE  Cerebellum:  limited by ams    Labs:     Ammonia, Serum: 49 umol/L (03.28.21 @ 07:16) Comprehensive Metabolic Panel (03.28.21 @ 07:16)   Sodium, Serum: 148 mmol/L   Potassium, Serum: 4.2 mmol/L   Chloride, Serum: 114 mmol/L   Carbon Dioxide, Serum: 17 mmol/L   Anion Gap, Serum: 17 mmol/L   Blood Urea Nitrogen, Serum: 68: Critical value: mg/dL   Creatinine, Serum: 1.9 mg/dL   Glucose, Serum: 167 mg/dL   Calcium, Total Serum: 7.2 mg/dL   Protein Total, Serum: 5.3 g/dL   Albumin, Serum: 2.0 g/dL   Bilirubin Total, Serum: 0.8 mg/dL   Alkaline Phosphatase, Serum: 337 U/L   Aspartate Aminotransferase (AST/SGOT): 70 U/L   Alanine Aminotransferase (ALT/SGPT): 23 U/L     eGFR if African American: 35 mL/min/1.73M2 Complete Blood Count + Automated Diff (03.28.21 @ 07:16)   WBC Count: 5.23 K/uL   RBC Count: 1.83 M/uL   Hemoglobin: 5.8: This result has been called to DR. GORDILLO by Meredith Munguia on 03 28 2021 at 0909, and has been read back. g/dL   Hematocrit: 18.2: This result has been called to DR. GORDILLO by Meredith Munguia on 03 28 2021 at 0909, and has been read back. %   Mean Cell Volume: 99.5 fL   Mean Cell Hemoglobin: 31.7 pg   Mean Cell Hemoglobin Conc: 31.9 g/dL   Red Cell Distrib Width: 14.4 %   Platelet Count - Automated: 179 K/uL   Auto Neutrophil #: 4.44 K/uL   Auto Lymphocyte #: 0.66 K/uL   Auto Monocyte #: 0.06 K/uL   Auto Eosinophil #: 0.01 K/uL   Auto Basophil #: 0.00 K/uL   Auto Neutrophil %: 85.0: Differential percentages must be correlated with absolute numbers for   clinical significance. %   Auto Lymphocyte %: 12.6 %   Auto Monocyte %: 1.1 %   Auto Eosinophil %: 0.2 %   Auto Basophil %: 0.0 %   Auto Immature Granulocyte %: 1.1 %   Nucleated RBC: 0 /100 WBCs     Blood Gas Profile - Arterial (03.26.21 @ 09:59)   pH, Arterial: 7.44   pCO2, Arterial: 29 mmHg   pO2, Arterial: 129 mmHg   HCO3, Arterial: 20 mmoL/L   Base Excess, Arterial: -3.6 mmoL/L   Oxygen Saturation, Arterial: 98 %     Valproic Acid Level, Serum: 8.0 ug/mL   (03.27.21 @ 12:17) Carbamazepine Level, Serum: 6.9 ug/mL (03.27.21 @ 12:17)     Procalcitonin, Serum: 1.16 (03-24)  Procalcitonin, Serum: 0.51 (03-22)  Procalcitonin, Serum: 0.43 (03-21)  Procalcitonin, Serum: 1.78 (03-17)    C-Reactive Protein, Serum: 301 (03-24)  C-Reactive Protein, Serum: 363 (03-22)  C-Reactive Protein, Serum: 283 (03-21)  C-Reactive Protein, Serum: 296 (03-17)    Ferritin, Serum: 882 (03-21)  Ferritin, Serum: 750 (03-17)      D-Dimer Assay, Quantitative: 5119 (03-24)  D-Dimer Assay, Quantitative: 646 (03-22)  D-Dimer Assay, Quantitative: 400 (03-21)        Neuroimaging:    < from: EEG (03.25.21 @ 17:20) >  EPILEPTIFORM ACTIVITY  Small number of right hemispheric sharp transients and rare sharps that are probably epileptiform in nature    < end of copied text >  < from: EEG (03.25.21 @ 17:20) >  IMPRESSIONS  This is an abnormal routine EEG due to the presence of  1.-Focal and generalized slowing as described above  2.-Abnormal interictal activity as described above    < end of copied text >    NCHCT:   < from: CT Brain Stroke Protocol (03.15.21 @ 21:00) >    IMPRESSION:    No evidence of acute intracranial hemorrhage or acute territorial infarct. Stable exam since February 22, 2016.    < end of copied text >  < from: CT Brain Stroke Protocol (03.15.21 @ 21:00) >        03-25-21 @ 20:12  < from: CT Angio Neck w/ IV Cont (03.15.21 @ 23:43) >  IMPRESSION:    Absence of flow related contrast in the left cerebral hemisphere corresponding to a large area of encephalomalacia.

## 2021-03-30 NOTE — PROGRESS NOTE ADULT - SUBJECTIVE AND OBJECTIVE BOX
DAVY ESPITIA             MRN-[id number]    CC: Shortness of breath    HPI: Pt was at home living w HHA who was COVID (+). Pt also caught  Covid, and had shortness of breath taken to ER.       SUBJECTIVE:    ROS:  DYSPNEA: YES   NAUS/VOM: NO 	  SECRETIONS: NO 	  AGITATION: YEs   Pain (Y/N):     YES   - located in legs  - sharp, comes and goes         PEx:  89y  General: AAOx1  found in bed appears to have advanced illness   HEENT:  NCAT PERRL EOMI Non icteric sclera   Neck: Supple no masses  CVS: RR S1S2 No edema  Resp: Labored  (+) mildly tachypneic No increased SOB on BIPAP  GI:  Soft NT ND BS+  :   Stevens   Musc: No C/C/E    Neuro: Follows commands No focal deficits  Psych: appears calm today   Skin: cyanosis noted, toes blue   Lymph: Normal              Last BM: 3/29 (2 BMS)      ALLERGIES: Ambien, Dilantin, Phenobarbitol    OPIATE NAÏVE (Y/N): no     MEDICATIONS: REVIEWED  MEDICATIONS  (STANDING):  ampicillin/sulbactam  IVPB 3 Gram(s) IV Intermittent every 12 hours  atorvastatin 20 milliGRAM(s) Oral at bedtime  carBAMazepine ER Capsule 200 milliGRAM(s) Oral two times a day  clopidogrel Tablet 75 milliGRAM(s) Oral daily  dexAMETHasone  Injectable 6 milliGRAM(s) IV Push daily  insulin glargine Injectable (LANTUS) 9 Unit(s) SubCutaneous every morning  insulin lispro (ADMELOG) corrective regimen sliding scale   SubCutaneous three times a day before meals  iohexol 300 mG (iodine)/mL Oral Solution 30 milliLiter(s) Oral once  levoFLOXacin IVPB 750 milliGRAM(s) IV Intermittent every 48 hours  metoprolol tartrate 50 milliGRAM(s) Oral two times a day  pantoprazole  Injectable 40 milliGRAM(s) IV Push every 12 hours  valproate sodium IVPB 125 milliGRAM(s) IV Intermittent every 6 hours    MEDICATIONS  (PRN):  acetaminophen   Tablet .. 650 milliGRAM(s) Oral every 6 hours PRN Temp greater or equal to 38C (100.4F), Mild Pain (1 - 3)  morphine  - Injectable 2 milliGRAM(s) IV Push every 4 hours PRN Moderate Pain (4 - 6)      LABS: REVIEWED  CBC:  no new labs  CMP:    no new labs     Albumin, Serum: 2.0 g/dL (03-28-21 @ 07:16)      IMAGING: REVIEWED    ADVANCED DIRECTIVES:       DNR/DNI  DECISION MAKER & LEGAL SURROGATE: Davy Espitia, son       PSYCHOSOCIAL-SPIRITUAL ASSESSMENT:       Reviewed       Care plan unchanged       Care plan adjusted as above	    GOALS OF CARE DISCUSSION       Palliative care info/counseling provided	           Family meeting: 3/30        Advanced Directives addressed please see Advance Care Planning Note	           See previous Palliative Medicine Note       Documentation of GOC: readdressed today    CURRENT DISPO PLAN:     ACUTE     REFERRALS	        Palliative Med        Unit SW/Case Mgmt

## 2021-03-30 NOTE — PROGRESS NOTE ADULT - ATTENDING COMMENTS
I have personally seen and examined this patient.  I have fully participated in the care of this patient.  I have reviewed all pertinent clinical information, including history, physical exam, plan and note.   I have reviewed all pertinent clinical information and reviewed all relevant imaging and diagnostic studies personally.  Pt w/ h/o epilepsy on carbamazepine and depakote now with COVID with severe ARDS.  Recommendations as above.  Agree with above assessment except as noted.

## 2021-03-30 NOTE — PROGRESS NOTE ADULT - SUBJECTIVE AND OBJECTIVE BOX
Hospital Day:  9d    Subjective:    Patient is a 89y old  Male who presents with a chief complaint of AMS (30 Mar 2021 08:55)      Admitted to medicine for a primary diagnosis of     Past Medical Hx:   CVA (cerebral vascular accident)    Seizures    HTN (hypertension)    Diabetes mellitus    Deformity      Past Sx:  H/O hernia repair    History of total hip replacement, right    Acute subdural hematoma      Allergies:  Ambien (Other)  Dilantin (Unknown)  phenobarbital (Unknown)    Current Meds:   Standng Meds:  ampicillin/sulbactam  IVPB 3 Gram(s) IV Intermittent every 12 hours  atorvastatin 20 milliGRAM(s) Oral at bedtime  carBAMazepine ER Capsule 200 milliGRAM(s) Oral two times a day  clopidogrel Tablet 75 milliGRAM(s) Oral daily  dexAMETHasone  Injectable 6 milliGRAM(s) IV Push daily  insulin glargine Injectable (LANTUS) 9 Unit(s) SubCutaneous every morning  insulin lispro (ADMELOG) corrective regimen sliding scale   SubCutaneous three times a day before meals  iohexol 300 mG (iodine)/mL Oral Solution 30 milliLiter(s) Oral once  levoFLOXacin IVPB 750 milliGRAM(s) IV Intermittent every 48 hours  metoprolol tartrate 50 milliGRAM(s) Oral two times a day  pantoprazole  Injectable 40 milliGRAM(s) IV Push every 12 hours  valproate sodium IVPB 125 milliGRAM(s) IV Intermittent every 6 hours    PRN Meds:  acetaminophen   Tablet .. 650 milliGRAM(s) Oral every 6 hours PRN Temp greater or equal to 38C (100.4F), Mild Pain (1 - 3)  morphine  - Injectable 2 milliGRAM(s) IV Push every 4 hours PRN Moderate Pain (4 - 6)    HOME MEDICATIONS:  atorvastatin 20 mg oral tablet: 1 tab(s) orally once a day  carBAMazepine 400 mg oral tablet, extended release:   clopidogrel 75 mg oral tablet: 1 tab(s) orally once a day  divalproex sodium 500 mg oral tablet, extended release:   Januvia 100 mg oral tablet:   lisinopril 5 mg oral tablet: 1 tab(s) orally once a day  metoprolol tartrate 50 mg oral tablet: 1 tab(s) orally 2 times a day      Vital Signs:   T(F): 97.4 (03-30-21 @ 05:46), Max: 97.5 (03-29-21 @ 20:52)  HR: 115 (03-30-21 @ 07:47) (92 - 117)  BP: 118/58 (03-30-21 @ 07:47) (91/50 - 125/58)  RR: 18 (03-30-21 @ 05:46) (18 - 25)  SpO2: 98% (03-30-21 @ 07:47) (97% - 98%)      03-29-21 @ 07:01  -  03-30-21 @ 07:00  --------------------------------------------------------  IN: 0 mL / OUT: 450 mL / NET: -450 mL        Physical Exam:   GENERAL: NAD  HEENT: NCAT  CHEST/LUNG: CTAB  HEART: Regular rate and rhythm; s1 s2 appreciated, No murmurs, rubs, or gallops  ABDOMEN: Soft, Nontender, Nondistended; Bowel sounds present  EXTREMITIES: No LE edema b/l  SKIN: no rashes, no new lesions  NERVOUS SYSTEM:  Alert & Oriented X3  LINES/CATHETERS:        Labs:                                      Hospital Day:  9d    Subjective:    Patient is a 89y old  Male who presents with a chief complaint of AMS. No overnight events. Appears comfortable, making eye contact, alert, not answering questions       Admitted to medicine for a primary diagnosis of Acute hypoxic respiratory failure due to COVID PNA    Past Medical Hx:   CVA (cerebral vascular accident)    Seizures    HTN (hypertension)    Diabetes mellitus    Deformity      Past Sx:  H/O hernia repair    History of total hip replacement, right    Acute subdural hematoma      Allergies:  Ambien (Other)  Dilantin (Unknown)  phenobarbital (Unknown)    Current Meds:   Standng Meds:  ampicillin/sulbactam  IVPB 3 Gram(s) IV Intermittent every 12 hours  atorvastatin 20 milliGRAM(s) Oral at bedtime  carBAMazepine ER Capsule 200 milliGRAM(s) Oral two times a day  clopidogrel Tablet 75 milliGRAM(s) Oral daily  dexAMETHasone  Injectable 6 milliGRAM(s) IV Push daily  insulin glargine Injectable (LANTUS) 9 Unit(s) SubCutaneous every morning  insulin lispro (ADMELOG) corrective regimen sliding scale   SubCutaneous three times a day before meals  iohexol 300 mG (iodine)/mL Oral Solution 30 milliLiter(s) Oral once  levoFLOXacin IVPB 750 milliGRAM(s) IV Intermittent every 48 hours  metoprolol tartrate 50 milliGRAM(s) Oral two times a day  pantoprazole  Injectable 40 milliGRAM(s) IV Push every 12 hours  valproate sodium IVPB 125 milliGRAM(s) IV Intermittent every 6 hours    PRN Meds:  acetaminophen   Tablet .. 650 milliGRAM(s) Oral every 6 hours PRN Temp greater or equal to 38C (100.4F), Mild Pain (1 - 3)  morphine  - Injectable 2 milliGRAM(s) IV Push every 4 hours PRN Moderate Pain (4 - 6)    HOME MEDICATIONS:  atorvastatin 20 mg oral tablet: 1 tab(s) orally once a day  carBAMazepine 400 mg oral tablet, extended release:   clopidogrel 75 mg oral tablet: 1 tab(s) orally once a day  divalproex sodium 500 mg oral tablet, extended release:   Januvia 100 mg oral tablet:   lisinopril 5 mg oral tablet: 1 tab(s) orally once a day  metoprolol tartrate 50 mg oral tablet: 1 tab(s) orally 2 times a day      Vital Signs:   T(F): 97.4 (03-30-21 @ 05:46), Max: 97.5 (03-29-21 @ 20:52)  HR: 115 (03-30-21 @ 07:47) (92 - 117)  BP: 118/58 (03-30-21 @ 07:47) (91/50 - 125/58)  RR: 18 (03-30-21 @ 05:46) (18 - 25)  SpO2: 98% (03-30-21 @ 07:47) (97% - 98%)      03-29-21 @ 07:01  -  03-30-21 @ 07:00  --------------------------------------------------------  IN: 0 mL / OUT: 450 mL / NET: -450 mL        Physical Exam:   GENERAL: frail, on bipap, appears comfortable   HEENT: NCAT  CHEST/LUNG: CTAB  HEART: Regular rate and rhythm; s1 s2 appreciated, No murmurs, rubs, or gallops  ABDOMEN: Soft, Nontender, Nondistended; Bowel sounds present  EXTREMITIES: No LE edema b/l  SKIN: no rashes, no new lesions  NERVOUS SYSTEM:  Alert & Oriented X0  LINES/CATHETERS: iv, allred         Labs:

## 2021-03-30 NOTE — PROGRESS NOTE ADULT - ATTENDING COMMENTS
88 yo M with PMHx of HTN, HLD, Epilepsy ( on VPA and carbamazepine) , CVA with R sided deficits presented with acute change in mental status per son. found to be covid positive, then transferred to [facility]    Patient transferred from [facility] for worsening hypoxemia on 3/20.   As per transfer note, at 12am vital check patient had SpO2 of 76-78% on 4L NC, oxygen increased to 25LNRB and then titrated down to 15LNRB. Kept patient on 15LNRB for 30mins. After 30mins began to slowly titrate down patient back to 5LNC, however during the process patient appeared more tachypneic and his SpO2 dropped to 89-90%.   Transferred back to [facility] for adequate management.     # Acute hypoxic respiratory failure due to COVID PNA  - Remdesivir 3/16 to 3/20  - Dexamethasone 3/16 to 3/25. May extend upto 3/27?  - S/p empiric cefepime (3/21-23; Procalcitonin : 0.43 3/21). But 3/24 Procalc back up to 1.16. CT shows air bronchograms besides the diffuse GGOs. Adding Unasyn, LVQ per Pulm on 3/26.   -D-dimer 5000 > LE Duplex neg.    3/24: 6L NC. comfortable breathing. Sleepy but arousable. Likely element of Covid encephalopathy. [place] frequently. Get OOB to chair tomorrow and PT soon.   3/25: 4L NC. goes back to sleep after being aroused. PO intake dropping. Ate a little with son at bedside. Spoke coherently to son for a little while before going back to sleep. Made DNR/DNI  3/26: Overnight tachycardic transiently 138 bpm. Sats worsened to NRM. Still on NRM. CTA neg for PE but diffuse GGOs in almost 80% of parenchyma with some air bronchograms on ALLI. Adding Unasyn, LVQ.   3/27: Overnight again tachycardic episode of 130s transiently. Bipap overnight. Now back on NRM. Sleeps all day long. c/w Dexa, Unasyn, LVQ.   NG feeds ok per family for now. Increase to D5W @ 100 for persistent/worsening hypernatremia.   3/28: Moderate respi distress while on continuous bipap. Could not eat/ drink d/t bipap. Labs noted. On IVFs. Worsening prognosis discussed with son. Agreed for CMO. No more heroic measures or investigations. c/w meds required for comfort. Keep bipap on. Tomorrow after paliiative assessment - take off bipap and use morphine to prevent respi distress. Son would like to be here when that is done.     3/29: We tried taking off Bipap and put on NRM. Desaturated instantly to 70s.   Per Palliative's discussion with son, he is agreeable for comfort measures. PLan to remove bipap and start morphine gtt around 11 am on 3/30 after family is here at bedside.     3/30: Family meeting at bedside along with palliative. Family made aware of continuous Bipap dependence since 3 days and little chance of recovery. But since mental status is little better (waxes and wanes), family wants to give him another chance for 2-3 days. They ok with Bipap, IV PPN, blood draws and IV medications. No Hemodialysis or other heroic measures. DNR/DNI. Palliative on board. Prognosis poor.     <OLD ISSUES>:  # metabolic encephalopathy likely Covid Encephalopathy.   - CT head and CTA head and neck did not show any acute stroke or occlusion.  - On Plavix  - ct head shows Absence of flow related contrast in the left cerebral hemisphere corresponding to a large area of encephalomalacia.   EEG - focal left hemispheric slowing. Valproate level low. Adjust dose? Unreliable intake.   Pending MRI, Neuro re-eval    #VITA, Hypernatremia:  D/t Poor po intake d/t covid encephalopathy. wakes to take his meds but otherwise poor appetite.   Now worsened to 152.   Increase to D5W @ 100. Monitor BMP. Nephro eval.  NG feeds ok per family. FW Flushes 100 pre, 200 post feeds Q6H .     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   IV AEDs     # Recent Acute pyelonephritis due to Ecoli   - s/p Ceftriaxone 3/16 to 3/19  - Vantin 3/19 to 3/22       # HTN:  -C/w IV metoprolol pushes PRN for tachycardia    # Type II DM  - On insulin scale  - a1c 7.7,   -RApid response 3/24 night for fingerstick 11. Now only on lantus 9 QAM and SSI per Endocrine. Observe.    Dispo: Active as above . Prognosis guarded. DNR/dNI . 89 yo M with PMHx of HTN, HLD, Epilepsy ( on VPA and carbamazepine) , CVA with R sided deficits presented with acute change in mental status per son. found to be covid positive, then transferred to [facility]    Patient transferred from [facility] for worsening hypoxemia on 3/20.   As per transfer note, at 12am vital check patient had SpO2 of 76-78% on 4L NC, oxygen increased to 25LNRB and then titrated down to 15LNRB. Kept patient on 15LNRB for 30mins. After 30mins began to slowly titrate down patient back to 5LNC, however during the process patient appeared more tachypneic and his SpO2 dropped to 89-90%.   Transferred back to [facility] for adequate management.     # Acute hypoxic respiratory failure due to COVID PNA  - Remdesivir 3/16 to 3/20  - Dexamethasone 3/16 to 3/25. May extend upto 3/27?  - S/p empiric cefepime (3/21-23; Procalcitonin : 0.43 3/21). But 3/24 Procalc back up to 1.16. CT shows air bronchograms besides the diffuse GGOs. Adding Unasyn, LVQ per Pulm on 3/26.   -D-dimer 5000 > LE Duplex neg.    3/24: 6L NC. comfortable breathing. Sleepy but arousable. Likely element of Covid encephalopathy. [place] frequently. Get OOB to chair tomorrow and PT soon.   3/25: 4L NC. goes back to sleep after being aroused. PO intake dropping. Ate a little with son at bedside. Spoke coherently to son for a little while before going back to sleep. Made DNR/DNI  3/26: Overnight tachycardic transiently 138 bpm. Sats worsened to NRM. Still on NRM. CTA neg for PE but diffuse GGOs in almost 80% of parenchyma with some air bronchograms on ALLI. Adding Unasyn, LVQ.   3/27: Overnight again tachycardic episode of 130s transiently. Bipap overnight. Now back on NRM. Sleeps all day long. c/w Dexa, Unasyn, LVQ.   NG feeds ok per family for now. Increase to D5W @ 100 for persistent/worsening hypernatremia.   3/28: Moderate respi distress while on continuous bipap. Could not eat/ drink d/t bipap. Labs noted. On IVFs. Worsening prognosis discussed with son. Agreed for CMO. No more heroic measures or investigations. c/w meds required for comfort. Keep bipap on. Tomorrow after paliiative assessment - take off bipap and use morphine to prevent respi distress. Son would like to be here when that is done.     3/29: We tried taking off Bipap and put on NRM. Desaturated instantly to 70s.   Per Palliative's discussion with son, he is agreeable for comfort measures. PLan to remove bipap and start morphine gtt around 11 am on 3/30 after family is here at bedside.     3/30: Family meeting at bedside along with palliative. Family made aware of continuous Bipap dependence since 3 days and little chance of recovery. But since mental status is little better (waxes and wanes), family wants to give him another chance for 2-3 days. They ok with Bipap, IV PPN, blood draws and IV medications. No Hemodialysis or other heroic measures. DNR/DNI. Palliative on board. Prognosis poor. Patient has poor circulation as evidenced by covid toes     <OLD ISSUES>:  # metabolic encephalopathy likely Covid Encephalopathy.   - CT head and CTA head and neck did not show any acute stroke or occlusion.  - On Plavix  - ct head shows Absence of flow related contrast in the left cerebral hemisphere corresponding to a large area of encephalomalacia.   EEG - focal left hemispheric slowing. Valproate level low. Adjust dose? Unreliable intake.   Pending MRI, Neuro re-eval    #VITA, Hypernatremia:  D/t Poor po intake d/t covid encephalopathy. wakes to take his meds but otherwise poor appetite.   Now worsened to 152.   Increase to D5W @ 100. Monitor BMP. Nephro eval.  NG feeds ok per family. FW Flushes 100 pre, 200 post feeds Q6H .     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   IV AEDs     # Recent Acute pyelonephritis due to Ecoli   - s/p Ceftriaxone 3/16 to 3/19  - Vantin 3/19 to 3/22       # HTN:  -C/w IV metoprolol pushes PRN for tachycardia    # Type II DM  - On insulin scale  - a1c 7.7,   -RApid response 3/24 night for fingerstick 11. Now only on lantus 9 QAM and SSI per Endocrine. Observe.    Dispo: Active as above . Prognosis guarded. DNR/dNI .

## 2021-03-30 NOTE — SWALLOW BEDSIDE ASSESSMENT ADULT - SLP PERTINENT HISTORY OF CURRENT PROBLEM
89 y/o M initially presented with AMS, found to be COVID+, and transferred to [facility]. Pt now transferred back to [facility] for worsening hypoxia/sepsis 2' COVID PNA. CXR (3/21/21) indicates increased bilateral opacities. PMHx of HTN, HLD, Epilepsy , CVA with R sided deficits. Rapid response 3/24 2' hypoglycemia, now fluctuating hypo/hyperglycemia- endocrinology c/s. Pt now w/ worsening mental status, likely 2' metabolic encephalopathy. CTH (-) for acute pathology, extensive left hemispheric encephalomalacia, table moderate parenchymal volume loss. Stable mild chronic microvascular ischemic changes involving the right cerebral hemisphere. Palliative c/s placed, as pt requesting comfort measures. 89 y/o M initially presented with AMS, found to be COVID+, and transferred to [facility]. Pt now transferred back to [facility] for worsening hypoxia/sepsis 2' COVID PNA. CXR (3/21/21) indicates increased bilateral opacities. PMHx of HTN, HLD, Epilepsy , CVA with R sided deficits. Rapid response 3/24 2' hypoglycemia, now fluctuating hypo/hyperglycemia- endocrinology c/s. Pt now w/ worsening mental status, likely 2' metabolic encephalopathy. pt requiring continuous bipap, unable to be weaned. CTH (-) for acute pathology, extensive left hemispheric encephalomalacia, table moderate parenchymal volume loss. Stable mild chronic microvascular ischemic changes involving the right cerebral hemisphere. Palliative c/s placed, as pt's family requesting comfort measures.

## 2021-03-30 NOTE — CHART NOTE - NSCHARTNOTEFT_GEN_A_CORE
NUTRITION SUPPORT CONSULTATION    HPI:  89 yo M with PMHx of HTN, HLD, Epilepsy (on VPA and carbamazepine) , CVA with R sided deficits presented with acute change in mental status per son. found to be covid positive, then transferred to [facility]    Patient transferred from [facility] for worsening hypoxemia on 3/20.     Palliative notes appreciated. Pt's family wishes to pursue continued care at this time as pt more alert today. If he declines they may pursue comfort measures only. Currently bipap dependent, NPO. Nutrition consult called for PPN while pt bipap dependent.    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  H/O hernia repair  History of total hip replacement, right  Acute subdural hematoma  With surgical intervention    Allergies  Ambien (Other)  Dilantin (Unknown)  phenobarbital (Unknown)    MEDICATIONS  (STANDING):  ampicillin/sulbactam  IVPB 3 Gram(s) IV Intermittent every 12 hours  atorvastatin 20 milliGRAM(s) Oral at bedtime  carBAMazepine ER Capsule 200 milliGRAM(s) Oral two times a day  clopidogrel Tablet 75 milliGRAM(s) Oral daily  dexAMETHasone  Injectable 6 milliGRAM(s) IV Push daily  fat emulsion (Fish Oil and Plant Based) 20% Infusion 1.8 Gm/kG/Day (31.3 mL/Hr) IV Continuous <Continuous>  insulin glargine Injectable (LANTUS) 9 Unit(s) SubCutaneous every morning  insulin lispro (ADMELOG) corrective regimen sliding scale   SubCutaneous three times a day before meals  levoFLOXacin IVPB 750 milliGRAM(s) IV Intermittent every 48 hours  metoprolol tartrate 50 milliGRAM(s) Oral two times a day  pantoprazole  Injectable 40 milliGRAM(s) IV Push every 12 hours  Parenteral Nutrition - Adult 1 Each (60 mL/Hr) TPN Continuous <Continuous>  valproate sodium IVPB 250 milliGRAM(s) IV Intermittent every 8 hours    MEDICATIONS  (PRN):  acetaminophen   Tablet .. 650 milliGRAM(s) Oral every 6 hours PRN Temp greater or equal to 38C (100.4F), Mild Pain (1 - 3)  haloperidol    Injectable 0.5 milliGRAM(s) IntraMuscular every 8 hours PRN agitation  morphine  - Injectable 2 milliGRAM(s) IV Push every 4 hours PRN Moderate Pain (4 - 6)    ICU Vital Signs Last 24 Hrs  T(C): 35.7 (30 Mar 2021 12:44), Max: 36.4 (29 Mar 2021 20:52)  T(F): 96.3 (30 Mar 2021 12:44), Max: 97.5 (29 Mar 2021 20:52)  HR: 100 (30 Mar 2021 17:00) (89 - 117)  BP: 116/54 (30 Mar 2021 17:00) (91/50 - 121/55)  RR: 22 (30 Mar 2021 13:00) (18 - 25)  SpO2: 95% (30 Mar 2021 17:00) (95% - 100%)    Drug Dosing Weight  Height (cm): 165.1 (26 Mar 2021 15:05)  Weight (kg): 55.6 (25 Mar 2021 19:50)  BMI (kg/m2): 20.4 (26 Mar 2021 15:05)  BSA (m2): 1.61 (26 Mar 2021 15:05)    EXAM  Awake, NAD. Frail appearing on bipap  Abd:  soft, ND  Enteral access: none  IV access: peripheral IV Rt arm    LABS  Magnesium, Serum: 2.2 mg/dL (03.28.21 @ 07:16)  Comprehensive Metabolic Panel (03.28.21 @ 07:16)    Sodium, Serum: 148 mmol/L    Potassium, Serum: 4.2 mmol/L    Chloride, Serum: 114 mmol/L    Carbon Dioxide, Serum: 17 mmol/L    Anion Gap, Serum: 17 mmol/L    Blood Urea Nitrogen, Serum: 68: Critical value: mg/dL    Creatinine, Serum: 1.9 mg/dL    Glucose, Serum: 167 mg/dL    Calcium, Total Serum: 7.2 mg/dL    Protein Total, Serum: 5.3 g/dL    Albumin, Serum: 2.0 g/dL    Bilirubin Total, Serum: 0.8 mg/dL    Alkaline Phosphatase, Serum: 337 U/L    Aspartate Aminotransferase (AST/SGOT): 70 U/L    Alanine Aminotransferase (ALT/SGPT): 23 U/L    eGFR if Non : 31: Interpretative comment  Albumin, Serum: 2.0 g/dL (03.28.21 @ 07:16)  Protein Total, Serum: 5.3 g/dL (03.28.21 @ 07:16)    Complete Blood Count + Automated Diff (03.28.21 @ 07:16)    WBC Count: 5.23 K/uL    RBC Count: 1.83 M/uL    Hemoglobin: 5.8: This result has been called to DR. GORDILLO by Meredith Munguia on 03 28 2021 at 0909, and has been read back. g/dL    Hematocrit: 18.2: This result has been called to DR. GORDILLO by Meredith Munguia on 03 28 2021 at 0909, and has been read back. %    Mean Cell Volume: 99.5 fL    Mean Cell Hemoglobin: 31.7 pg    Mean Cell Hemoglobin Conc: 31.9 g/dL    Red Cell Distrib Width: 14.4 %    Platelet Count - Automated: 179 K/uL    Auto Neutrophil #: 4.44 K/uL    Auto Lymphocyte #: 0.66 K/uL    Auto Monocyte #: 0.06 K/uL    Auto Eosinophil #: 0.01 K/uL    Auto Basophil #: 0.00 K/uL    Auto Neutrophil %: 85.0: Differential percentages must be correlated with absolute numbers for  clinical significance. %    Auto Lymphocyte %: 12.6 %    Auto Monocyte %: 1.1 %    Auto Eosinophil %: 0.2 %    Auto Basophil %: 0.0 %    Auto Immature Granulocyte %: 1.1 %    Nucleated RBC: 0 /100 WBCs    CAPILLARY BLOOD GLUCOSE  POCT Blood Glucose.: 224 mg/dL (30 Mar 2021 16:21)  POCT Blood Glucose.: 187 mg/dL (30 Mar 2021 11:13)  POCT Blood Glucose.: 164 mg/dL (30 Mar 2021 07:35)  POCT Blood Glucose.: 198 mg/dL (29 Mar 2021 21:04)    Diet, Dysphagia 3 Soft-Nectar Consistency Fluid:   Consistent Carbohydrate {No Snacks}  DASH/TLC {Sodium & Cholesterol Restricted}  Tube Feed Start Time: 21:00  Prosource Gelatein Plus     Qty per Day:  1  Supplement Feeding Modality:  Oral  Ensure Pudding Cans or Servings Per Day:  3       Frequency:  Three Times a day (03-27-21 @ 07:38)    ASSESSMENT  - Acute hypoxic respiratory failure due to COVID PNA  - Acute anemia, Hgb 5.8  - Acute pyelonephritis due to Ecoli   - Altered Mental status >> likely due to metabolic encephalopathy  - VITA  - DM, a1c 7.7  - hx multiple CVA's, seizures  - severe malnutrition    PLAN  - please keep NPO while bipap dependent  - check baseline TG level  - start PPN tonight  - d/c any IVF's once PPN begins  - daily bmp, in phos, mg while on parenteral nutrition  - if family plans on continuing with aggressive tx then must consider central access for TPN  - if pt can come off bipap, even for a short time, could consider placing post pyloric feeding tube for enteral feeds however family does not want feeding tube  - will follow

## 2021-03-30 NOTE — CHART NOTE - NSCHARTNOTEFT_GEN_A_CORE
I made rounds today with the treatment team including the hospitalist, residents,  nurses and  and discussed the patient's current medical status and discharge  planning needs, and reviewed the chart.    T(C): 35.6 (03-30-21 @ 21:29), Max: 36.3 (03-30-21 @ 05:46)  HR: 91 (03-30-21 @ 21:29) (89 - 117)  BP: 124/57 (03-30-21 @ 21:29) (91/53 - 124/57)  RR: 20 (03-30-21 @ 21:29) (18 - 25)  SpO2: 99% (03-30-21 @ 21:29) (95% - 100%)          I reached out to the patient's health care proxy/ responsible family member-           [     ]  I reached                                     and discussed the patient's medical condition,                   family concerns, and discharge planning           [     ]  I left a message with family               [x     ]  I personally participated in rounds with the medical team and my resident and discussed the case. My resident reached                   family member/ HCP  Davy [phone]                              under my direction and supervision  and we reviewed the conversation.          [     ]  My resident left a message with family under my direction and supervision    The following was discussed:    Medical status updated.      [     ] I spent 5-10 minutes on the above discussing medical issues with team members and family and/ or my resident    [  x   ] I spent 11-20 minutes on the above discussing medical issues with team members and family and/ or my resident    [     ] I spent 21-30 minutes on the above discussing medical issues with team members and family and/ or my resident

## 2021-03-30 NOTE — PROGRESS NOTE ADULT - SUBJECTIVE AND OBJECTIVE BOX
Nephrology progress note    THIS IS AN INCOMPLETE NOTE . FULL NOTE TO FOLLOW SHORTLY    Patient is seen and examined, events over the last 24 h noted .    Allergies:  Ambien (Other)  Dilantin (Unknown)  phenobarbital (Unknown)    Hospital Medications:   MEDICATIONS  (STANDING):  ampicillin/sulbactam  IVPB 3 Gram(s) IV Intermittent every 12 hours  atorvastatin 20 milliGRAM(s) Oral at bedtime  carBAMazepine ER Capsule 200 milliGRAM(s) Oral two times a day  clopidogrel Tablet 75 milliGRAM(s) Oral daily  dexAMETHasone  Injectable 6 milliGRAM(s) IV Push daily  insulin glargine Injectable (LANTUS) 9 Unit(s) SubCutaneous every morning  insulin lispro (ADMELOG) corrective regimen sliding scale   SubCutaneous three times a day before meals  iohexol 300 mG (iodine)/mL Oral Solution 30 milliLiter(s) Oral once  levoFLOXacin IVPB 750 milliGRAM(s) IV Intermittent every 48 hours  metoprolol tartrate 50 milliGRAM(s) Oral two times a day  pantoprazole  Injectable 40 milliGRAM(s) IV Push every 12 hours  valproate sodium IVPB 125 milliGRAM(s) IV Intermittent every 6 hours        VITALS:  T(F): 97.4 (03-30-21 @ 05:46), Max: 97.5 (03-29-21 @ 20:52)  HR: 115 (03-30-21 @ 07:47)  BP: 118/58 (03-30-21 @ 07:47)  RR: 18 (03-30-21 @ 05:46)  SpO2: 98% (03-30-21 @ 07:47)  Wt(kg): --    03-28 @ 07:01  -  03-29 @ 07:00  --------------------------------------------------------  IN: 0 mL / OUT: 900 mL / NET: -900 mL    03-29 @ 07:01  -  03-30 @ 07:00  --------------------------------------------------------  IN: 0 mL / OUT: 450 mL / NET: -450 mL          PHYSICAL EXAM:  Constitutional: NAD  HEENT: anicteric sclera, oropharynx clear, MMM  Neck: No JVD  Respiratory: CTAB, no wheezes, rales or rhonchi  Cardiovascular: S1, S2, RRR  Gastrointestinal: BS+, soft, NT/ND  Extremities: No cyanosis or clubbing. No peripheral edema  :  No allred.   Skin: No rashes    LABS:            Urine Studies:      RADIOLOGY & ADDITIONAL STUDIES:   Patient will be placed on comfort care as per notes in the chart   Please recall for any question

## 2021-03-30 NOTE — SWALLOW BEDSIDE ASSESSMENT ADULT - SWALLOW EVAL: RECOMMENDED DIET
NPO w/ alternate means of nutrition/hydration
NPO w/ alternate means of nutrition/hydration
unable to make po diet recs at this time. if lethargy persists, consider alternate means of nutrition/hydration
dysphagia 3 w/ nectar-thick liquids
Dysphagia 3, mechanical soft w/ nectar thick liquids.

## 2021-03-31 NOTE — PROGRESS NOTE ADULT - ATTENDING COMMENTS
I saw and evaluated patient  by bedside, lethargic on continuous bipap tx, on PPN.   All labs, radiology studies, VS was reviewed  I have reviewed the resident's note and agree with documented findings and  plan of care.  a/p:  Patient is an 89 yo Male  with PMHx of HTN, HLD, Epilepsy ( on VPA and carbamazepine) , CVA with R sided deficits presented with acute change in mental status per son. found to be covid positive, then transferred to [facility]    Patient transferred from [facility] for worsening hypoxemia on 3/20.   As per transfer note, at 12am vital check patient had SpO2 of 76-78% on 4L NC, oxygen increased to 25LNRB and then titrated down to 15LNRB. Kept patient on 15LNRB for 30mins. After 30mins began to slowly titrate down patient back to 5LNC, however during the process patient appeared more tachypneic and his SpO2 dropped to 89-90%.   Transferred back to [facility] for adequate management.     # Acute hypoxic respiratory failure due to COVID PNA  - Remdesivir 3/16 to 3/20  - Dexamethasone 3/16 - will need to taper off steroids.  - S/p empiric cefepime (3/21-23; Procalcitonin : 0.43 3/21). But 3/24 Procalc back up to 1.16. CT shows air bronchograms besides the diffuse GGOs. - IV ABX: Unasyn, Levaquin per Pulm on 3/26.   -D-dimer 5000 > LE Duplex neg.    3/24: 6L NC. comfortable breathing. Sleepy but arousable. Likely element of Covid encephalopathy. Rock frequently. Get OOB to chair tomorrow and PT soon.   3/25: 4L NC. goes back to sleep after being aroused. PO intake dropping. Ate a little with son at bedside. Spoke coherently to son for a little while before going back to sleep. Made DNR/DNI  3/26: Overnight tachycardic transiently 138 bpm. Sats worsened to NRM. Still on NRM. CTA neg for PE but diffuse GGOs in almost 80% of parenchyma with some air bronchograms on ALLI. Adding Unasyn, LVQ.   3/27: Overnight again tachycardic episode of 130s transiently. Bipap overnight. Now back on NRM. Sleeps all day long. c/w Dexa, Unasyn, LVQ.   NG feeds ok per family for now. Increase to D5W @ 100 for persistent/worsening hypernatremia.   3/28: Moderate respi distress while on continuous bipap. Could not eat/ drink d/t bipap. Labs noted. On IVFs. Worsening prognosis discussed with son. Agreed for CMO. No more heroic measures or investigations. c/w meds required for comfort. Keep bipap on. Tomorrow after paliiative assessment - take off bipap and use morphine to prevent respi distress. Son would like to be here when that is done.     3/29: We tried taking off Bipap and put on NRM. Desaturated instantly to 70s.   Per Palliative's discussion with son, he is agreeable for comfort measures. PLan to remove bipap and start morphine gtt around 11 am on 3/30 after family is here at bedside.     3/30: Family meeting at bedside along with palliative. Family made aware of continuous Bipap dependence since 3 days and little chance of recovery. But since mental status is little better (waxes and wanes), family wants to give him another chance for 2-3 days. They ok with Bipap, IV PPN, blood draws and IV medications. No Hemodialysis or other heroic measures. DNR/DNI. Palliative on board. Prognosis poor. Patient has poor circulation as evidenced by covid toes     3/31-pt. remains on bipap tx, lethargic, very poor prognosis, dyspneic requiring IV morphine prn. tx.    <OLD ISSUES>:  # metabolic encephalopathy likely Covid Encephalopathy.   - CT head and CTA head and neck did not show any acute stroke or occlusion.  - On Plavix  - ct head shows Absence of flow related contrast in the left cerebral hemisphere corresponding to a large area of encephalomalacia.   EEG - focal left hemispheric slowing. Valproate level low. Adjust dose? Unreliable intake.   Pending MRI, Neuro re-eval    #VITA, Hypernatremia:  on PPN tx, f/up BMP     # Hx of CVA with right hemiparesis/ Seizure d/o    - On Plavix, Statin . (unable to take PO meds)  IV AEDs     # Recent Acute pyelonephritis due to Ecoli   -completed tx.       # HTN:  -C/w IV metoprolol pushes PRN for tachycardia    # Type II DM  - On insulin scale  - a1c 7.7,   -RApid response 3/24 night for fingerstick 11. Now only on lantus 9 QAM and SSI per Endocrine. Observe.    patient's prognosis: very poor.    #Progress Note Handoff: f/up family if they will agree to transition patient to comfort care.   Family discussion: yes, communication team Disposition: to be determined

## 2021-03-31 NOTE — PROGRESS NOTE ADULT - ASSESSMENT
89 yo M with PMHx of HTN, HLD, Epilepsy (on VPA and carbamazepine) , CVA with R sided deficits presented with acute change in mental status per son. found to be covid positive, then transfered to [facility]    Patient transferred from [facility] for worsening hypoxemia on 3/20.   As per transfer note, at 12am vital check patient had SpO2 of 76-78% on 4L NC, oxygen increased to 25LNRB and then titrated down to 15LNRB. Kept patient on 15LNRB for 30mins. After 30mins began to slowly titrate down patient back to 5LNC, however during the process patient appeared more tachypneic and his SpO2 dropped to 89-90%.   Transferred back to [facility] for adequate management.     NO ESCALATION OF CARE (SEE PALLIATIVE NOTE)    # Acute hypoxic respiratory failure due to COVID PNA  - Remdesivir 3/16 to 3/20  - Dexamethasone 3/16 to 3/25  - currently on bipap, unable to wean off   - Family deciding on bipap removal  - tachypenic and tachycardia overnight 3/26 with associated stomach breathing. EKG shows sinus tach. d-dimer 5k LE duplex negative. CT chest PE protocol negative for PE, however, air broncograms were seen, started unasyn and levaquin    # Acute anemia   - Hb 7.0 <- 5.8 <- 7.5   - CT A/P no retroperitoneal hemorrhage   - F/u repeat lab per family request     # Acute pyelonephritis due to Ecoli   - s/p Ceftriaxone 3/16 to 3/19 and Vantin 3/19 to 3/22  - c/w allred     # AMS  - likely due to metabolic encephalopathy, severe illness. Doubt  stroke  - CTH and CTA head and neck did not show any acute stroke or occlusion.  - CTH did show absence of flow related contrast in the left cerebral hemisphere corresponding to a large area of encephalomalacia.   - No EEG, Echo and MR head given poor prognosis/no escalation of care   - C/w Plavix  - C/w Haldol, morphine, valproic acid, carbatrol   - poor po intake, started PPN yesterday, per nutrition support   - Palliative c/s appreciated    # hypernatremia and VITA  - likely secondary to poor po intake  - S/p d5 iv hydration. avoid fluid overload  - Cr 1.6, Na 151  - Increase fluids in PPN diet     # Hx of CVA with right hemiparesis/ Seizure d/o    - On Plavix, Statin     # HTN  -C/w lisinopril and metoprolol    # Type II DM  - On insulin lantus   - a1c 7.7,   - endo consult done, monitor fs    DVT ppx: none  GI ppx: protonix   Diet: ppn   Activity: IAT  Dispo: acute   code status: dnr/dni, no escalation of care. Family agrees to PPN and labs. Modified MOLST in chart

## 2021-03-31 NOTE — PROGRESS NOTE ADULT - SUBJECTIVE AND OBJECTIVE BOX
Hospital Day:  11d    Subjective:    Patient is a 89y old  Male who presents with a chief complaint of covid pneumonia. Agitated overnight, needing increasing doses of morphine. This morning lethargic, bipap dependent, not alert or oriented       Admitted to medicine for a primary diagnosis of Acute hypoxic respiratory failure    Past Medical Hx:   CVA (cerebral vascular accident)    Seizures    HTN (hypertension)    Diabetes mellitus    Deformity      Past Sx:  H/O hernia repair    History of total hip replacement, right    Acute subdural hematoma      Allergies:  Ambien (Other)  Dilantin (Unknown)  phenobarbital (Unknown)    Current Meds:   Standng Meds:  ampicillin/sulbactam  IVPB 3 Gram(s) IV Intermittent every 12 hours  atorvastatin 20 milliGRAM(s) Oral at bedtime  carBAMazepine ER Capsule 200 milliGRAM(s) Oral two times a day  clopidogrel Tablet 75 milliGRAM(s) Oral daily  dexAMETHasone  Injectable 6 milliGRAM(s) IV Push daily  dextrose 40% Gel 15 Gram(s) Oral once  dextrose 5%. 1000 milliLiter(s) (50 mL/Hr) IV Continuous <Continuous>  dextrose 5%. 1000 milliLiter(s) (100 mL/Hr) IV Continuous <Continuous>  dextrose 50% Injectable 25 Gram(s) IV Push once  dextrose 50% Injectable 12.5 Gram(s) IV Push once  dextrose 50% Injectable 25 Gram(s) IV Push once  fat emulsion (Fish Oil and Plant Based) 20% Infusion 1.8 Gm/kG/Day (31.3 mL/Hr) IV Continuous <Continuous>  glucagon  Injectable 1 milliGRAM(s) IntraMuscular once  insulin lispro (ADMELOG) corrective regimen sliding scale   SubCutaneous three times a day before meals  levoFLOXacin IVPB 750 milliGRAM(s) IV Intermittent every 48 hours  metoprolol tartrate 50 milliGRAM(s) Oral two times a day  pantoprazole  Injectable 40 milliGRAM(s) IV Push every 12 hours  Parenteral Nutrition - Adult 1 Each (60 mL/Hr) TPN Continuous <Continuous>  valproate sodium IVPB 250 milliGRAM(s) IV Intermittent every 8 hours    PRN Meds:  acetaminophen   Tablet .. 650 milliGRAM(s) Oral every 6 hours PRN Temp greater or equal to 38C (100.4F), Mild Pain (1 - 3)  haloperidol    Injectable 0.5 milliGRAM(s) IntraMuscular every 8 hours PRN agitation  morphine  - Injectable 4 milliGRAM(s) IV Push every 3 hours PRN resp distress / agitation    HOME MEDICATIONS:  atorvastatin 20 mg oral tablet: 1 tab(s) orally once a day  carBAMazepine 400 mg oral tablet, extended release:   clopidogrel 75 mg oral tablet: 1 tab(s) orally once a day  divalproex sodium 500 mg oral tablet, extended release:   Januvia 100 mg oral tablet:   lisinopril 5 mg oral tablet: 1 tab(s) orally once a day  metoprolol tartrate 50 mg oral tablet: 1 tab(s) orally 2 times a day      Vital Signs:   T(F): 100.1 (03-31-21 @ 23:15), Max: 100.1 (03-31-21 @ 23:15)  HR: 98 (04-01-21 @ 08:09) (92 - 136)  BP: 180/76 (03-31-21 @ 23:15) (132/65 - 180/76)  RR: 18 (03-31-21 @ 23:15) (18 - 20)  SpO2: 97% (04-01-21 @ 08:09) (86% - 100%)      03-31-21 @ 07:01  -  04-01-21 @ 07:00  --------------------------------------------------------  IN: 0 mL / OUT: 750 mL / NET: -750 mL    04-01-21 @ 07:01  -  04-01-21 @ 09:04  --------------------------------------------------------  IN: 0 mL / OUT: 400 mL / NET: -400 mL        Physical Exam:   GENERAL: frail, on bipap, appears comfortable   HEENT: NCAT  CHEST/LUNG: pt not cooperative   HEART: Regular rate and rhythm; s1 s2 appreciated, No murmurs, rubs, or gallops  ABDOMEN: Soft, Nontender, Nondistended; Bowel sounds present  EXTREMITIES: No LE edema b/l  SKIN: bluish toes discoloration b/l, multiple area of ecchymosis in b/l ue   NERVOUS SYSTEM:  Alert & Oriented X0  LINES/CATHETERS: iv, allred     Labs:                         7.0    5.95  )-----------( 82       ( 31 Mar 2021 14:15 )             21.7     Neutophil% 95.5, Lymphocyte% 0.9, Monocyte% 0.0, Bands% -- 03-31-21 @ 14:15    31 Mar 2021 14:15    151    |  118    |  55     ----------------------------<  280    3.9     |  17     |  1.6      Ca    7.3        31 Mar 2021 14:15  Phos  4.3       31 Mar 2021 14:15  Mg     2.1       31 Mar 2021 16:21    TPro  4.8    /  Alb  1.9    /  TBili  0.7    /  DBili  x      /  AST  34     /  ALT  16     /  AlkPhos  353    31 Mar 2021 14:15       pTT    30.0             ----< 1.77 INR  (03-31-21 @ 14:15)    20.30        PT

## 2021-03-31 NOTE — PROGRESS NOTE ADULT - ASSESSMENT
88 yo M with PMHx of HTN, HLD, Epilepsy (on VPA and carbamazepine) , CVA with R sided deficits presented with acute change in mental status per son. found to be covid positive, then transfered to [facility]    Patient transferred from [facility] for worsening hypoxemia on 3/20.   As per transfer note, at 12am vital check patient had SpO2 of 76-78% on 4L NC, oxygen increased to 25LNRB and then titrated down to 15LNRB. Kept patient on 15LNRB for 30mins. After 30mins began to slowly titrate down patient back to 5LNC, however during the process patient appeared more tachypneic and his SpO2 dropped to 89-90%.   Transferred back to [facility] for adequate management.     NO ESCALATION OF CARE (SEE PALLIATIVE NOTE)    # Acute hypoxic respiratory failure due to COVID PNA  - Remdesivir 3/16 to 3/20  - Dexamethasone 3/16 to 3/25  - currently on bipap, unable to wean off   - Family wanted to terminally withdrawal bipap today, but decided to c/w bipap for now as pt more alert today  - tachypenic and tachycardia overnight 3/26 with associated stomach breathing. EKG shows sinus tach. d-dimer 5k LE duplex negative. CT chest PE protocol negative for PE, however, air broncograms were seen, started unasyn and levaquin    # Acute anemia   - Hb 5.8 <- 7.5   - CT A/P no retroperitoneal hemorrhage   - Family would like repeat labs today     # Acute pyelonephritis due to Ecoli   - s/p Ceftriaxone 3/16 to 3/19 and Vantin 3/19 to 3/22  - c/w allred     #Altered Mental status >> likely due to metabolic encephalopathy , Doubt  stroke  - CTH and CTA head and neck did not show any acute stroke or occlusion.  - CTH did show absence of flow related contrast in the left cerebral hemisphere corresponding to a large area of encephalomalacia.   - No EEG, Echo and MR head given poor prognosis/no escalation of care   - C/w Plavix  - C/w Haldol, morphine, valproic acid, carbatrol   - poor po intake, started PPN today, per nutrition support   - Palliative c/s appreciated    # hypernatremia and VITA  - likely secondary to poor po intake  - S/p d5 iv hydration. avoid fluid overload  - F/u bmp 16:00 today     # Hx of CVA with right hemiparesis/ Seizure d/o    - On Plavix, Statin     # HTN:  -C/w lisinopril and metoprolol    # Type II DM  - On insulin lantus   - a1c 7.7,   - endo consult done, monitor fs    DVT ppx: none  GI ppx: protonix   Diet: ppn   Activity: IAT  Dispo: acute   code status: dnr/dni, no escalation of care. Family agrees to PPN and labs today. Modified MOLST in chart 88 yo M with PMHx of HTN, HLD, Epilepsy (on VPA and carbamazepine) , CVA with R sided deficits presented with acute change in mental status per son. found to be covid positive, then transfered to [facility]    Patient transferred from [facility] for worsening hypoxemia on 3/20.   As per transfer note, at 12am vital check patient had SpO2 of 76-78% on 4L NC, oxygen increased to 25LNRB and then titrated down to 15LNRB. Kept patient on 15LNRB for 30mins. After 30mins began to slowly titrate down patient back to 5LNC, however during the process patient appeared more tachypneic and his SpO2 dropped to 89-90%.   Transferred back to [facility] for adequate management.     NO ESCALATION OF CARE (SEE PALLIATIVE NOTE)    # Acute hypoxic respiratory failure due to COVID PNA  - Remdesivir 3/16 to 3/20  - Dexamethasone 3/16 to 3/25  - currently on bipap, unable to wean off   - Family deciding on bipap removal  - tachypenic and tachycardia overnight 3/26 with associated stomach breathing. EKG shows sinus tach. d-dimer 5k LE duplex negative. CT chest PE protocol negative for PE, however, air broncograms were seen, started unasyn and levaquin    # Acute anemia   - Hb 5.8 <- 7.5   - CT A/P no retroperitoneal hemorrhage   - F/u repeat lab per family request     # Acute pyelonephritis due to Ecoli   - s/p Ceftriaxone 3/16 to 3/19 and Vantin 3/19 to 3/22  - c/w allred     # AMS  - likely due to metabolic encephalopathy, severe illness. Doubt  stroke  - CTH and CTA head and neck did not show any acute stroke or occlusion.  - CTH did show absence of flow related contrast in the left cerebral hemisphere corresponding to a large area of encephalomalacia.   - No EEG, Echo and MR head given poor prognosis/no escalation of care   - C/w Plavix  - C/w Haldol, morphine, valproic acid, carbatrol   - poor po intake, started PPN yesterday, per nutrition support   - Palliative c/s appreciated    # hypernatremia and VITA  - likely secondary to poor po intake  - S/p d5 iv hydration. avoid fluid overload  - F/u bmp     # Hx of CVA with right hemiparesis/ Seizure d/o    - On Plavix, Statin     # HTN  -C/w lisinopril and metoprolol    # Type II DM  - On insulin lantus   - a1c 7.7,   - endo consult done, monitor fs    DVT ppx: none  GI ppx: protonix   Diet: ppn   Activity: IAT  Dispo: acute   code status: dnr/dni, no escalation of care. Family agrees to PPN and labs. Modified MOLST in chart

## 2021-03-31 NOTE — PROGRESS NOTE ADULT - SUBJECTIVE AND OBJECTIVE BOX
Hospital Day:  11d    Subjective:    Patient is a 89y old  Male who presents with a chief complaint of covid pneumonia (31 Mar 2021 07:05)      Admitted to medicine for a primary diagnosis of     Past Medical Hx:   CVA (cerebral vascular accident)    Seizures    HTN (hypertension)    Diabetes mellitus    Deformity      Past Sx:  H/O hernia repair    History of total hip replacement, right    Acute subdural hematoma      Allergies:  Ambien (Other)  Dilantin (Unknown)  phenobarbital (Unknown)    Current Meds:   Standng Meds:  ampicillin/sulbactam  IVPB 3 Gram(s) IV Intermittent every 12 hours  atorvastatin 20 milliGRAM(s) Oral at bedtime  carBAMazepine ER Capsule 200 milliGRAM(s) Oral two times a day  clopidogrel Tablet 75 milliGRAM(s) Oral daily  dexAMETHasone  Injectable 6 milliGRAM(s) IV Push daily  fat emulsion (Fish Oil and Plant Based) 20% Infusion 1.8 Gm/kG/Day (31.3 mL/Hr) IV Continuous <Continuous>  insulin glargine Injectable (LANTUS) 9 Unit(s) SubCutaneous every morning  insulin lispro (ADMELOG) corrective regimen sliding scale   SubCutaneous three times a day before meals  levoFLOXacin IVPB 750 milliGRAM(s) IV Intermittent every 48 hours  metoprolol tartrate 50 milliGRAM(s) Oral two times a day  pantoprazole  Injectable 40 milliGRAM(s) IV Push every 12 hours  Parenteral Nutrition - Adult 1 Each (60 mL/Hr) TPN Continuous <Continuous>  valproate sodium IVPB 250 milliGRAM(s) IV Intermittent every 8 hours    PRN Meds:  acetaminophen   Tablet .. 650 milliGRAM(s) Oral every 6 hours PRN Temp greater or equal to 38C (100.4F), Mild Pain (1 - 3)  haloperidol    Injectable 0.5 milliGRAM(s) IntraMuscular every 8 hours PRN agitation  morphine  - Injectable 2 milliGRAM(s) IV Push every 3 hours PRN Dyspnea    HOME MEDICATIONS:  atorvastatin 20 mg oral tablet: 1 tab(s) orally once a day  carBAMazepine 400 mg oral tablet, extended release:   clopidogrel 75 mg oral tablet: 1 tab(s) orally once a day  divalproex sodium 500 mg oral tablet, extended release:   Januvia 100 mg oral tablet:   lisinopril 5 mg oral tablet: 1 tab(s) orally once a day  metoprolol tartrate 50 mg oral tablet: 1 tab(s) orally 2 times a day      Vital Signs:   T(F): 96.2 (03-31-21 @ 21:36), Max: 98.7 (03-31-21 @ 14:37)  HR: 92 (03-31-21 @ 22:40) (92 - 136)  BP: 146/78 (03-31-21 @ 21:36) (132/65 - 146/78)  RR: 18 (03-31-21 @ 21:36) (18 - 20)  SpO2: 98% (03-31-21 @ 23:15) (86% - 100%)      03-31-21 @ 07:01  -  04-01-21 @ 07:00  --------------------------------------------------------  IN: 0 mL / OUT: 750 mL / NET: -750 mL        Physical Exam:   GENERAL: NAD  HEENT: NCAT  CHEST/LUNG: CTAB  HEART: Regular rate and rhythm; s1 s2 appreciated, No murmurs, rubs, or gallops  ABDOMEN: Soft, Nontender, Nondistended; Bowel sounds present  EXTREMITIES: No LE edema b/l  SKIN: no rashes, no new lesions  NERVOUS SYSTEM:  Alert & Oriented X3  LINES/CATHETERS:        Labs:                         7.0    5.95  )-----------( 82       ( 31 Mar 2021 14:15 )             21.7     Neutophil% 95.5, Lymphocyte% 0.9, Monocyte% 0.0, Bands% -- 03-31-21 @ 14:15    31 Mar 2021 14:15    151    |  118    |  55     ----------------------------<  280    3.9     |  17     |  1.6      Ca    7.3        31 Mar 2021 14:15  Phos  4.3       31 Mar 2021 14:15  Mg     2.1       31 Mar 2021 16:21    TPro  4.8    /  Alb  1.9    /  TBili  0.7    /  DBili  x      /  AST  34     /  ALT  16     /  AlkPhos  353    31 Mar 2021 14:15       pTT    30.0             ----< 1.77 INR  (03-31-21 @ 14:15)    20.30        PT

## 2021-03-31 NOTE — CHART NOTE - NSCHARTNOTEFT_GEN_A_CORE
Remains bipap dependent  PPN infusing  No bloodwork again this morning, needs daily lytes while on parenteral nutrition  Family wishes to cont with current level of care    T(F): 98.7 (03-31-21 @ 14:37), Max: 98.7 (03-31-21 @ 14:37)  HR: 117 (03-31-21 @ 14:37) (91 - 136)  BP: 132/65 (03-31-21 @ 14:37) (124/57 - 132/65)  RR: 20 (03-31-21 @ 14:37) (18 - 20)  SpO2: 100% (03-31-21 @ 14:37) (86% - 100%)    I&O's Detail    30 Mar 2021 07:01  -  31 Mar 2021 07:00  --------------------------------------------------------  IN:  Total IN: 0 mL    OUT:    Indwelling Catheter - Urethral (mL): 450 mL  Total OUT: 450 mL    Total NET: -450 mL    31 Mar 2021 07:01  -  31 Mar 2021 17:13  --------------------------------------------------------  IN:  Total IN: 0 mL    OUT:    Indwelling Catheter - Urethral (mL): 750 mL  Total OUT: 750 mL    Total NET: -750 mL    03-31    151<H>  |  118<H>  |  55<H>  ----------------------------<  280<H>  3.9   |  17  |  1.6<H>    Ca    7.3<L>      31 Mar 2021 14:15  Phos  4.3     03-31  Phosphorus Level, Serum: 4.3 mg/dL (03.31.21 @ 14:15)    TPro  4.8<L>  /  Alb  1.9<L>  /  TBili  0.7  /  DBili  x   /  AST  34  /  ALT  16  /  AlkPhos  353<H>  03-31                        7.0    5.95  )-----------( 82       ( 31 Mar 2021 14:15 )             21.7     CAPILLARY BLOOD GLUCOSE  POCT Blood Glucose.: 348 mg/dL (31 Mar 2021 11:21)  POCT Blood Glucose.: 284 mg/dL (31 Mar 2021 08:01)    Diet, Dysphagia 3 Soft-Nectar Consistency Fluid:   Consistent Carbohydrate {No Snacks}  DASH/TLC {Sodium & Cholesterol Restricted}  Tube Feed Start Time: 21:00  Prosource Gelatein Plus     Qty per Day:  1  Supplement Feeding Modality:  Oral  Ensure Pudding Cans or Servings Per Day:  3       Frequency:  Three Times a day (03-27-21 @ 07:38)    ASSESSMENT  - Acute hypoxic respiratory failure due to COVID PNA  - Acute anemia, Hgb 5.8  - Acute pyelonephritis due to Ecoli   - Altered Mental status >> likely due to metabolic encephalopathy  - VITA  - DM, a1c 7.7  - hx multiple CVA's, seizures  - severe malnutrition    PLAN  - please keep NPO while bipap dependent  - check TG clearance level  - cont PPN tonight  - must get daily bmp, in phos, mg while on parenteral nutrition   - if family plans on continuing with aggressive tx then must consider central access for TPN  - if pt can come off bipap, even for a short time, could consider placing post pyloric feeding tube for enteral feeds however family does not want feeding tube  - will follow. Remains bipap dependent  PPN infusing  No bloodwork again this morning, needs daily lytes while on parenteral nutrition  Family wishes to cont with current level of care    T(F): 98.7 (03-31-21 @ 14:37), Max: 98.7 (03-31-21 @ 14:37)  HR: 117 (03-31-21 @ 14:37) (91 - 136)  BP: 132/65 (03-31-21 @ 14:37) (124/57 - 132/65)  RR: 20 (03-31-21 @ 14:37) (18 - 20)  SpO2: 100% (03-31-21 @ 14:37) (86% - 100%)    I&O's Detail    30 Mar 2021 07:01  -  31 Mar 2021 07:00  --------------------------------------------------------  IN:  Total IN: 0 mL - BUT PPN AND LIPIDS ARE INFUSING    OUT:    Indwelling Catheter - Urethral (mL): 450 mL  Total OUT: 450 mL    Total NET: -450 mL    31 Mar 2021 07:01  -  31 Mar 2021 17:13  --------------------------------------------------------  IN:  Total IN: 0 mL    OUT:    Indwelling Catheter - Urethral (mL): 750 mL  Total OUT: 750 mL    Total NET: -750 mL    03-31    151<H>  |  118<H>  |  55<H>  ----------------------------<  280<H>  3.9   |  17  |  1.6<H>    Ca    7.3<L>      31 Mar 2021 14:15  Phos  4.3     03-31  Phosphorus Level, Serum: 4.3 mg/dL (03.31.21 @ 14:15)    TPro  4.8<L>  /  Alb  1.9<L>  /  TBili  0.7  /  DBili  x   /  AST  34  /  ALT  16  /  AlkPhos  353<H>  03-31                        7.0    5.95  )-----------( 82       ( 31 Mar 2021 14:15 )             21.7     CAPILLARY BLOOD GLUCOSE  POCT Blood Glucose.: 348 mg/dL (31 Mar 2021 11:21)  POCT Blood Glucose.: 284 mg/dL (31 Mar 2021 08:01)    Diet, Dysphagia 3 Soft-Nectar Consistency Fluid:   Consistent Carbohydrate {No Snacks}  DASH/TLC {Sodium & Cholesterol Restricted}  Tube Feed Start Time: 21:00  Prosource Gelatein Plus     Qty per Day:  1  Supplement Feeding Modality:  Oral  Ensure Pudding Cans or Servings Per Day:  3       Frequency:  Three Times a day (03-27-21 @ 07:38)    ASSESSMENT  - Acute hypoxic respiratory failure due to COVID PNA  - Acute anemia, Hgb 5.8  - Acute pyelonephritis due to Ecoli   - Altered Mental status >> likely due to metabolic encephalopathy  - VITA  - DM, a1c 7.7 - now with added ociv-induced insulin resistance  - hx multiple CVA's, seizures  - severe malnutrition    PLAN  - please keep NPO while bipap dependent  - check TG clearance level  - cont PPN tonight  - must get daily bmp, in phos, mg while on parenteral nutrition   - if family plans on continuing with aggressive tx then must consider central access for TPN  - if pt can come off bipap, even for a short time, could consider placing post pyloric feeding tube for enteral feeds however family does not want feeding tube  - GLYCEMIC CONTROL - note that PPN contains minimal dextrose  - will follow.

## 2021-03-31 NOTE — PROGRESS NOTE ADULT - SUBJECTIVE AND OBJECTIVE BOX
Hospital Day:  10d    Subjective:    Patient is a 89y old  Male who presents with a chief complaint of shortness of breath. Agitated overnight, was repeatedly banging right arm on side of rail, needed restrains, increasing o2 via BIPAP, haldol and morphine. This morning, pt is lethargic, moaning, unable to arouse.       Admitted to medicine for a primary diagnosis of     Past Medical Hx:   CVA (cerebral vascular accident)    Seizures    HTN (hypertension)    Diabetes mellitus    Deformity      Past Sx:  H/O hernia repair    History of total hip replacement, right    Acute subdural hematoma      Allergies:  Ambien (Other)  Dilantin (Unknown)  phenobarbital (Unknown)    Current Meds:   Standng Meds:  ampicillin/sulbactam  IVPB 3 Gram(s) IV Intermittent every 12 hours  atorvastatin 20 milliGRAM(s) Oral at bedtime  carBAMazepine ER Capsule 200 milliGRAM(s) Oral two times a day  clopidogrel Tablet 75 milliGRAM(s) Oral daily  dexAMETHasone  Injectable 6 milliGRAM(s) IV Push daily  fat emulsion (Fish Oil and Plant Based) 20% Infusion 1.8 Gm/kG/Day (31.3 mL/Hr) IV Continuous <Continuous>  insulin glargine Injectable (LANTUS) 9 Unit(s) SubCutaneous every morning  insulin lispro (ADMELOG) corrective regimen sliding scale   SubCutaneous three times a day before meals  levoFLOXacin IVPB 750 milliGRAM(s) IV Intermittent every 48 hours  metoprolol tartrate 50 milliGRAM(s) Oral two times a day  pantoprazole  Injectable 40 milliGRAM(s) IV Push every 12 hours  Parenteral Nutrition - Adult 1 Each (60 mL/Hr) TPN Continuous <Continuous>  valproate sodium IVPB 250 milliGRAM(s) IV Intermittent every 8 hours    PRN Meds:  acetaminophen   Tablet .. 650 milliGRAM(s) Oral every 6 hours PRN Temp greater or equal to 38C (100.4F), Mild Pain (1 - 3)  haloperidol    Injectable 0.5 milliGRAM(s) IntraMuscular every 8 hours PRN agitation    HOME MEDICATIONS:  atorvastatin 20 mg oral tablet: 1 tab(s) orally once a day  carBAMazepine 400 mg oral tablet, extended release:   clopidogrel 75 mg oral tablet: 1 tab(s) orally once a day  divalproex sodium 500 mg oral tablet, extended release:   Januvia 100 mg oral tablet:   lisinopril 5 mg oral tablet: 1 tab(s) orally once a day  metoprolol tartrate 50 mg oral tablet: 1 tab(s) orally 2 times a day      Vital Signs:   T(F): 97.1 (03-31-21 @ 05:00), Max: 97.1 (03-31-21 @ 05:00)  HR: 120 (03-31-21 @ 05:00) (89 - 120)  BP: 131/62 (03-31-21 @ 05:00) (116/54 - 131/62)  RR: 18 (03-31-21 @ 05:00) (18 - 25)  SpO2: 100% (03-31-21 @ 09:09) (95% - 100%)      03-30-21 @ 07:01  -  03-31-21 @ 07:00  --------------------------------------------------------  IN: 0 mL / OUT: 450 mL / NET: -450 mL        Physical Exam:   GENERAL: frail, ill, contracted on left ue, not alert to name  HEENT: NCAT  CHEST/LUNG: unable to assess   HEART: Regular rate and rhythm; s1 s2 appreciated, No murmurs, rubs, or gallops  ABDOMEN: Soft, Nontender, Nondistended; Bowel sounds present  EXTREMITIES: No LE edema b/l  SKIN: covid toes, multiple areas of ecchymosis, some with oozing blood  NERVOUS SYSTEM:  Alert & Oriented X0  LINES/CATHETERS: tha allred         Labs:

## 2021-03-31 NOTE — PROGRESS NOTE ADULT - ASSESSMENT
89 yo M with PMHx of HTN, HLD, Epilepsy (on VPA and carbamazepine) , CVA with R sided deficits presented with acute change in mental status per son. found to be covid positive, then transfered to [facility]    Patient transferred from [facility] for worsening hypoxemia on 3/20.   As per transfer note, at 12am vital check patient had SpO2 of 76-78% on 4L NC, oxygen increased to 25LNRB and then titrated down to 15LNRB. Kept patient on 15LNRB for 30mins. After 30mins began to slowly titrate down patient back to 5LNC, however during the process patient appeared more tachypneic and his SpO2 dropped to 89-90%.   Transferred back to [facility] for adequate management.     NO ESCALATION OF CARE (SEE PALLIATIVE NOTE)    # Acute hypoxic respiratory failure due to COVID PNA  - Remdesivir 3/16 to 3/20  - Dexamethasone 3/16 to 3/25  - currently on bipap, unable to wean off   - Family deciding on bipap removal  - tachypenic and tachycardia overnight 3/26 with associated stomach breathing. EKG shows sinus tach. d-dimer 5k LE duplex negative. CT chest PE protocol negative for PE, however, air broncograms were seen, started unasyn and levaquin    # Acute anemia   - Hb 7.0 <- 5.8 <- 7.5   - CT A/P no retroperitoneal hemorrhage   - F/u repeat lab per family request     # Acute pyelonephritis due to Ecoli   - s/p Ceftriaxone 3/16 to 3/19 and Vantin 3/19 to 3/22  - c/w brigida     # AMS  - likely due to metabolic encephalopathy, severe illness. Doubt  stroke  - CTH and CTA head and neck did not show any acute stroke or occlusion.  - CTH did show absence of flow related contrast in the left cerebral hemisphere corresponding to a large area of encephalomalacia.   - No EEG, Echo and MR head given poor prognosis/no escalation of care   - C/w Plavix  - C/w Haldol, morphine (can inc to 4mg), valproic acid, carbatrol   - poor po intake, started PPN yesterday, per nutrition support   - Palliative c/s appreciated    # hypernatremia and VITA  - likely secondary to poor po intake  - S/p d5 iv hydration. avoid fluid overload  - Cr 1.6, Na 151    # Hx of CVA with right hemiparesis/ Seizure d/o    - On Plavix, Statin     # HTN  -C/w lisinopril and metoprolol    # Type II DM  - On insulin lantus   - a1c 7.7,   - endo consult done, monitor fs    DVT ppx: none  GI ppx: protonix   Diet: ppn   Activity: IAT  Dispo: acute   code status: dnr/dni, no escalation of care. Family agrees to PPN and labs. Modified MOLST in chart

## 2021-04-01 NOTE — PROGRESS NOTE ADULT - TIME BILLING
medical chart reviewed, completed bedside patient's assessment, discussed medical plan of care with covering medical team and case management.
review medical chart, complete patient's bedside assessment, discuss medical plan of tx. with medical team and case management.

## 2021-04-01 NOTE — PROGRESS NOTE ADULT - SUBJECTIVE AND OBJECTIVE BOX
Hospital Day:  11d    Subjective:    Patient is a 89y old  Male who presents with a chief complaint of covid pneumonia. Agitated overnight, needing increasing doses of morphine. This morning lethargic, bipap dependent, not alert or oriented       Admitted to medicine for a primary diagnosis of Acute hypoxic respiratory failure    Past Medical Hx:   CVA (cerebral vascular accident)    Seizures    HTN (hypertension)    Diabetes mellitus    Deformity      Past Sx:  H/O hernia repair    History of total hip replacement, right    Acute subdural hematoma      Allergies:  Ambien (Other)  Dilantin (Unknown)  phenobarbital (Unknown)    Current Meds:   Standng Meds:  ampicillin/sulbactam  IVPB 3 Gram(s) IV Intermittent every 12 hours  atorvastatin 20 milliGRAM(s) Oral at bedtime  carBAMazepine ER Capsule 200 milliGRAM(s) Oral two times a day  clopidogrel Tablet 75 milliGRAM(s) Oral daily  dexAMETHasone  Injectable 6 milliGRAM(s) IV Push daily  dextrose 40% Gel 15 Gram(s) Oral once  dextrose 5%. 1000 milliLiter(s) (50 mL/Hr) IV Continuous <Continuous>  dextrose 5%. 1000 milliLiter(s) (100 mL/Hr) IV Continuous <Continuous>  dextrose 50% Injectable 25 Gram(s) IV Push once  dextrose 50% Injectable 12.5 Gram(s) IV Push once  dextrose 50% Injectable 25 Gram(s) IV Push once  fat emulsion (Fish Oil and Plant Based) 20% Infusion 1.8 Gm/kG/Day (31.3 mL/Hr) IV Continuous <Continuous>  glucagon  Injectable 1 milliGRAM(s) IntraMuscular once  insulin lispro (ADMELOG) corrective regimen sliding scale   SubCutaneous three times a day before meals  levoFLOXacin IVPB 750 milliGRAM(s) IV Intermittent every 48 hours  metoprolol tartrate 50 milliGRAM(s) Oral two times a day  pantoprazole  Injectable 40 milliGRAM(s) IV Push every 12 hours  Parenteral Nutrition - Adult 1 Each (60 mL/Hr) TPN Continuous <Continuous>  valproate sodium IVPB 250 milliGRAM(s) IV Intermittent every 8 hours    PRN Meds:  acetaminophen   Tablet .. 650 milliGRAM(s) Oral every 6 hours PRN Temp greater or equal to 38C (100.4F), Mild Pain (1 - 3)  haloperidol    Injectable 0.5 milliGRAM(s) IntraMuscular every 8 hours PRN agitation  morphine  - Injectable 4 milliGRAM(s) IV Push every 3 hours PRN resp distress / agitation    HOME MEDICATIONS:  atorvastatin 20 mg oral tablet: 1 tab(s) orally once a day  carBAMazepine 400 mg oral tablet, extended release:   clopidogrel 75 mg oral tablet: 1 tab(s) orally once a day  divalproex sodium 500 mg oral tablet, extended release:   Januvia 100 mg oral tablet:   lisinopril 5 mg oral tablet: 1 tab(s) orally once a day  metoprolol tartrate 50 mg oral tablet: 1 tab(s) orally 2 times a day      Vital Signs:   T(F): 100.1 (03-31-21 @ 23:15), Max: 100.1 (03-31-21 @ 23:15)  HR: 98 (04-01-21 @ 08:09) (92 - 136)  BP: 180/76 (03-31-21 @ 23:15) (132/65 - 180/76)  RR: 18 (03-31-21 @ 23:15) (18 - 20)  SpO2: 97% (04-01-21 @ 08:09) (86% - 100%)      03-31-21 @ 07:01  -  04-01-21 @ 07:00  --------------------------------------------------------  IN: 0 mL / OUT: 750 mL / NET: -750 mL    04-01-21 @ 07:01  -  04-01-21 @ 09:10  --------------------------------------------------------  IN: 0 mL / OUT: 400 mL / NET: -400 mL        Physical Exam:   GENERAL: frail, on bipap, appears comfortable   HEENT: NCAT  CHEST/LUNG: pt not cooperative   HEART: Regular rate and rhythm; s1 s2 appreciated, No murmurs, rubs, or gallops  ABDOMEN: Soft, Nontender, Nondistended; Bowel sounds present  EXTREMITIES: No LE edema b/l  SKIN: bluish toes discoloration b/l, multiple area of ecchymosis in b/l ue   NERVOUS SYSTEM:  Alert & Oriented X0  LINES/CATHETERS: iv, allred         Labs:                         7.0    5.95  )-----------( 82       ( 31 Mar 2021 14:15 )             21.7     Neutophil% 95.5, Lymphocyte% 0.9, Monocyte% 0.0, Bands% -- 03-31-21 @ 14:15    31 Mar 2021 14:15    151    |  118    |  55     ----------------------------<  280    3.9     |  17     |  1.6      Ca    7.3        31 Mar 2021 14:15  Phos  4.3       31 Mar 2021 14:15  Mg     2.1       31 Mar 2021 16:21    TPro  4.8    /  Alb  1.9    /  TBili  0.7    /  DBili  x      /  AST  34     /  ALT  16     /  AlkPhos  353    31 Mar 2021 14:15       pTT    30.0             ----< 1.77 INR  (03-31-21 @ 14:15)    20.30        PT

## 2021-04-01 NOTE — PROGRESS NOTE ADULT - ATTENDING COMMENTS
I saw and evaluated patient  by bedside, remains very lethargic, on continuous bipap tx, hypoxic, on ppn.  All labs, radiology studies, VS was reviewed  I have reviewed the resident's note and agree with documented findings and  plan of care.  a/p:  Patient is an 88 yo Male  with PMHx of HTN, HLD, Epilepsy ( on VPA and carbamazepine) , CVA with R sided deficits presented with acute change in mental status per son. found to be covid positive, then transferred to [facility]    Patient transferred from [facility] for worsening hypoxemia on 3/20.   As per transfer note, at 12am vital check patient had SpO2 of 76-78% on 4L NC, oxygen increased to 25LNRB and then titrated down to 15LNRB. Kept patient on 15LNRB for 30mins. After 30mins began to slowly titrate down patient back to 5LNC, however during the process patient appeared more tachypneic and his SpO2 dropped to 89-90%.   Transferred back to [facility] for adequate management.     # Acute hypoxic respiratory failure due to COVID PNA  - Remdesivir 3/16 to 3/20  - Dexamethasone 3/16 - will need to taper off steroids.  - S/p empiric cefepime (3/21-23; Procalcitonin : 0.43 3/21). But 3/24 Procalc back up to 1.16. CT shows air bronchograms besides the diffuse GGOs. - IV ABX: Unasyn, Levaquin per Pulm on 3/26.   -D-dimer 5000 > LE Duplex neg.       4/1/2021- patient remains hypoxic on bipap tx, lethargic on PPN.     <OLD ISSUES>:  # metabolic encephalopathy likely Covid Encephalopathy.   - CT head and CTA head and neck did not show any acute stroke or occlusion.  - On Plavix  - ct head shows Absence of flow related contrast in the left cerebral hemisphere corresponding to a large area of encephalomalacia.   EEG - focal left hemispheric slowing. Valproate level low. Adjust dose? Unreliable intake.   Pending MRI, Neuro re-eval    #VITA, Hypernatremia:  on PPN tx, f/up BMP     # Hx of CVA with right hemiparesis/ Seizure d/o    - On Plavix, Statin . (unable to take PO meds)  IV AEDs     # Recent Acute pyelonephritis due to Ecoli   -completed tx.       # HTN:  -C/w IV metoprolol pushes PRN for tachycardia    # Type II DM  - On insulin scale  - a1c 7.7,   -RApid response 3/24 night for fingerstick 11. Now only on lantus 9 QAM and SSI per Endocrine. Observe.    patient's prognosis: very poor.    #Progress Note Handoff: family would like to come and visit patient today. will discuss possible patient's transition to comfort care level.   Family discussion: yes, communication team Disposition: to be determined .

## 2021-04-01 NOTE — PROGRESS NOTE ADULT - SUBJECTIVE AND OBJECTIVE BOX
CARLOS ALBERTO ESPITIA             MRN-[id number]    CC: Shortness of breath     HPI: Pt COVID (+) with symptoms       SUBJECTIVE:    ROS:  DYSPNEA: Y / N	  NAUS/VOM: Y / N	  SECRETIONS: Y / N	  AGITATION: Y / N  Pain (Y/N):       -Provocation/Palliation:  -Quality/Quantity:  -Radiating:  -Severity:  -Timing/Frequency:  -Impact on ADLs:    OTHER REVIEW OF SYSTEMS:  UNABLE TO OBTAIN  due to:     PEx:  89y  General: AAOx0  found in bed appears to have advanced illness   HEENT:  NCAT PERRL EOMI Non icteric sclera   Neck: Supple no masses  CVS: RR S1S2 No M/G/R  Resp: labored breathing, on BIPAP, using accessory muscles   GI:  Soft NT ND BS+  :   Stevens   Musc: No C/C/E    Neuro: Follows commands No focal deficits  Psych: not responsive to tactile or verbal stimuli today   Skin: cyanosis noted, toes blue   Lymph: Normal              Last BM:       ALLERGIES: NKA    OPIATE NAÏVE (Y/N): no     MEDICATIONS: REVIEWED  MEDICATIONS  (STANDING):  ampicillin/sulbactam  IVPB 3 Gram(s) IV Intermittent every 12 hours  atorvastatin 20 milliGRAM(s) Oral at bedtime  carBAMazepine ER Capsule 200 milliGRAM(s) Oral two times a day  clopidogrel Tablet 75 milliGRAM(s) Oral daily  dexAMETHasone  Injectable 6 milliGRAM(s) IV Push daily  fat emulsion (Fish Oil and Plant Based) 20% Infusion 1.8 Gm/kG/Day (31.3 mL/Hr) IV Continuous <Continuous>  fat emulsion (Fish Oil and Plant Based) 20% Infusion 1.8 Gm/kG/Day (31.3 mL/Hr) IV Continuous <Continuous>  insulin lispro (ADMELOG) corrective regimen sliding scale   SubCutaneous three times a day before meals  levoFLOXacin IVPB 750 milliGRAM(s) IV Intermittent every 48 hours  metoprolol tartrate 50 milliGRAM(s) Oral two times a day  pantoprazole  Injectable 40 milliGRAM(s) IV Push every 12 hours  Parenteral Nutrition - Adult 1 Each (60 mL/Hr) TPN Continuous <Continuous>  Parenteral Nutrition - Adult 1 Each (60 mL/Hr) TPN Continuous <Continuous>  valproate sodium IVPB 250 milliGRAM(s) IV Intermittent every 8 hours    MEDICATIONS  (PRN):  acetaminophen   Tablet .. 650 milliGRAM(s) Oral every 6 hours PRN Temp greater or equal to 38C (100.4F), Mild Pain (1 - 3)  morphine  - Injectable 4 milliGRAM(s) IV Push every 3 hours PRN resp distress / agitation      LABS: REVIEWED  CBC:                        7.0    5.95  )-----------( 82       ( 31 Mar 2021 14:15 )             21.7     CMP:    03-31    151<H>  |  118<H>  |  55<H>  ----------------------------<  280<H>  3.9   |  17  |  1.6<H>    Ca    7.3<L>      31 Mar 2021 14:15  Phos  4.3     03-31  Mg     2.1     03-31    TPro  4.8<L>  /  Alb  1.9<L>  /  TBili  0.7  /  DBili  x   /  AST  34  /  ALT  16  /  AlkPhos  353<H>  03-31  Albumin, Serum: 1.9 g/dL (03-31-21 @ 14:15)      IMAGING: REVIEWED    ADVANCED DIRECTIVES:           DNR/DNI no escalation of care    DECISION MAKER: Edmund Espitia   LEGAL SURROGATE:    PSYCHOSOCIAL-SPIRITUAL ASSESSMENT:       Reviewed       Care plan unchanged       Care plan adjusted as above	    GOALS OF CARE DISCUSSION       Palliative care info/counseling provided	           Family meeting       Advanced Directives addressed please see Advance Care Planning Note	           See previous Palliative Medicine Note       Documentation of GOC:    CURRENT DISPO PLAN:     WILL REMAIN IN HOSPITAL STILL ACUTE      REFERRALS	        Palliative Med        Unit SW/Case Mgmt              Speech/Swallow       Patient/Family Support      Hospice       Nutrition       Dietician       PT/OT

## 2021-04-01 NOTE — CHART NOTE - NSCHARTNOTEFT_GEN_A_CORE
Spoke to son Eduardo coles, [phone], updated him on pt's medical condition answered all his questions. Him and his cousin would like to visit today at 19:00

## 2021-04-01 NOTE — PROGRESS NOTE ADULT - TREATMENT GUIDELINE COMMENT
DNR/DNI no hemodialysis, no pressors, no NGT/PEG. Trial of PPN supportive care with BIPAP for now
No escalation of care

## 2021-04-01 NOTE — PROGRESS NOTE ADULT - CONVERSATION DETAILS
Palliative NP, attending MD, and bedside nurse met with pt's son and nephew    Reviewed current medical condition and overall poor prognosis. Son was going to remove BIPAP today, but after visiting dad yesterday and seeing he is still conversant he cannot let him pass away as yet. Dr Esparza explained medically he will likely not survive as his oxygen requirements on BIPAP are so high and he cannot tolerate coming off mask.     Son said he knows his father will likely not recover from respiratory failure with Covid. But emotionally since his father is still conversant. Not acutely suffering, as PRN morphine is helpful. He said they want to give it a few days at least.     We discussed limiting some interventions. He agreed to trial of PPN, no hemodialysis no pressors. If pt starts declining wants Comfort measures only. At that point he would re- address BIPAP removal.
Palliative NP spoke to Davy and Sylvester  Reviewed current condition, pt declining on BIPAP. Mental status - no longer responsive  Son Davy has struggled with making pt full comfort measures because pt was responsive. But now pt has declined.    He wants to visit jabier with his cousin. Pt pt doesn't have any improvement in mental status will make him full CMO and will allow BIPAP removal.    He said he did not want a medical work up of pt's decreased mental status at this time.

## 2021-04-01 NOTE — PROGRESS NOTE ADULT - ASSESSMENT
88 yo M with PMHx of HTN, HLD, Epilepsy (on VPA and carbamazepine) , CVA with R sided deficits presented with acute change in mental status per son. found to be covid positive, then transfered to [facility]    Patient transferred from [facility] for worsening hypoxemia on 3/20.   As per transfer note, at 12am vital check patient had SpO2 of 76-78% on 4L NC, oxygen increased to 25LNRB and then titrated down to 15LNRB. Kept patient on 15LNRB for 30mins. After 30mins began to slowly titrate down patient back to 5LNC, however during the process patient appeared more tachypneic and his SpO2 dropped to 89-90%.   Transferred back to [facility] for adequate management.     NO ESCALATION OF CARE (SEE PALLIATIVE NOTE)    # Acute hypoxic respiratory failure due to COVID PNA  - Remdesivir 3/16 to 3/20  - Dexamethasone 3/16 to 3/25  - currently on bipap, unable to wean off   - Family deciding on bipap removal  - tachypenic and tachycardia overnight 3/26 with associated stomach breathing. EKG shows sinus tach. d-dimer 5k LE duplex negative. CT chest PE protocol negative for PE, however, air broncograms were seen, started unasyn and levaquin    # Acute anemia   - Hb 7.0 <- 5.8 <- 7.5   - CT A/P no retroperitoneal hemorrhage   - F/u repeat lab per family request     # Acute pyelonephritis due to Ecoli   - s/p Ceftriaxone 3/16 to 3/19 and Vantin 3/19 to 3/22  - c/w allred     # AMS  - likely due to metabolic encephalopathy, severe illness. Doubt  stroke  - CTH and CTA head and neck did not show any acute stroke or occlusion.  - CTH did show absence of flow related contrast in the left cerebral hemisphere corresponding to a large area of encephalomalacia.   - No EEG, Echo and MR head given poor prognosis/no escalation of care   - C/w Plavix  - C/w Haldol, morphine, valproic acid, carbatrol   - poor po intake, started PPN yesterday, per nutrition support   - Palliative c/s appreciated    # hypernatremia and VITA  - likely secondary to poor po intake  - S/p d5 iv hydration. avoid fluid overload  - Cr 1.6, Na 151  - Increase fluids in PPN diet     # Hx of CVA with right hemiparesis/ Seizure d/o    - On Plavix, Statin     # HTN  -C/w lisinopril and metoprolol    # Type II DM  - On insulin lantus + SS  - a1c 7.7,   - endo consult done, monitor fs    DVT ppx: none  GI ppx: protonix   Diet: ppn   Activity: IAT  Dispo: acute   code status: dnr/dni, no escalation of care. Family agrees to PPN and labs. Modified MOLST in chart

## 2021-04-01 NOTE — PROGRESS NOTE ADULT - ASSESSMENT
89yMale being evaluated for      MEDD (morphine equivalent daily dose):      See Recs below.    Please call [phone] with questions or concerns 24/7.   We will continue to follow.

## 2021-04-02 NOTE — PROGRESS NOTE ADULT - SUBJECTIVE AND OBJECTIVE BOX
Hospital Day:  12d    Subjective:    Patient is a 89y old  Male who presents with a chief complaint of shortness of breath (01 Apr 2021 15:29)      Admitted to medicine for a primary diagnosis of     Past Medical Hx:   CVA (cerebral vascular accident)    Seizures    HTN (hypertension)    Diabetes mellitus    Deformity      Past Sx:  H/O hernia repair    History of total hip replacement, right    Acute subdural hematoma      Allergies:  Ambien (Other)  Dilantin (Unknown)  phenobarbital (Unknown)    Current Meds:   Standng Meds:  ampicillin/sulbactam  IVPB 3 Gram(s) IV Intermittent every 12 hours  atorvastatin 20 milliGRAM(s) Oral at bedtime  carBAMazepine ER Capsule 200 milliGRAM(s) Oral two times a day  clopidogrel Tablet 75 milliGRAM(s) Oral daily  dexAMETHasone  Injectable 6 milliGRAM(s) IV Push daily  fat emulsion (Fish Oil and Plant Based) 20% Infusion 1.8 Gm/kG/Day (31.3 mL/Hr) IV Continuous <Continuous>  insulin glargine Injectable (LANTUS) 9 Unit(s) SubCutaneous every morning  insulin lispro (ADMELOG) corrective regimen sliding scale   SubCutaneous three times a day before meals  levoFLOXacin IVPB 750 milliGRAM(s) IV Intermittent every 48 hours  metoprolol tartrate 50 milliGRAM(s) Oral two times a day  pantoprazole  Injectable 40 milliGRAM(s) IV Push every 12 hours  Parenteral Nutrition - Adult 1 Each (60 mL/Hr) TPN Continuous <Continuous>  valproate sodium IVPB 250 milliGRAM(s) IV Intermittent every 8 hours    PRN Meds:  acetaminophen   Tablet .. 650 milliGRAM(s) Oral every 6 hours PRN Temp greater or equal to 38C (100.4F), Mild Pain (1 - 3)  morphine  - Injectable 4 milliGRAM(s) IV Push every 3 hours PRN resp distress / agitation    HOME MEDICATIONS:  atorvastatin 20 mg oral tablet: 1 tab(s) orally once a day  carBAMazepine 400 mg oral tablet, extended release:   clopidogrel 75 mg oral tablet: 1 tab(s) orally once a day  divalproex sodium 500 mg oral tablet, extended release:   Januvia 100 mg oral tablet:   lisinopril 5 mg oral tablet: 1 tab(s) orally once a day  metoprolol tartrate 50 mg oral tablet: 1 tab(s) orally 2 times a day      Vital Signs:   T(F): 96.7 (04-02-21 @ 05:00), Max: 96.7 (04-01-21 @ 14:28)  HR: 88 (04-02-21 @ 05:00) (88 - 103)  BP: 125/61 (04-02-21 @ 05:00) (123/56 - 126/60)  RR: 18 (04-02-21 @ 05:00) (18 - 18)  SpO2: 97% (04-02-21 @ 05:00) (95% - 100%)      04-01-21 @ 07:01  -  04-02-21 @ 07:00  --------------------------------------------------------  IN: 0 mL / OUT: 500 mL / NET: -500 mL        Physical Exam:   GENERAL: NAD  HEENT: NCAT  CHEST/LUNG: CTAB  HEART: Regular rate and rhythm; s1 s2 appreciated, No murmurs, rubs, or gallops  ABDOMEN: Soft, Nontender, Nondistended; Bowel sounds present  EXTREMITIES: No LE edema b/l  SKIN: no rashes, no new lesions  NERVOUS SYSTEM:  Alert & Oriented X3  LINES/CATHETERS:        Labs:                         7.0    5.95  )-----------( 82       ( 31 Mar 2021 14:15 )             21.7       31 Mar 2021 14:15    151    |  118    |  55     ----------------------------<  280    3.9     |  17     |  1.6      Ca    7.3        31 Mar 2021 14:15  Phos  4.3       31 Mar 2021 14:15  Mg     2.1       31 Mar 2021 16:21    TPro  4.8    /  Alb  1.9    /  TBili  0.7    /  DBili  x      /  AST  34     /  ALT  16     /  AlkPhos  353    31 Mar 2021 14:15                             Hospital Day:  12d    Subjective:    Patient is a 89y old  Male who presents with a chief complaint of shortness of breath. No overnight events. This morning, pt appears comfortable, bipap dependent.       Admitted to medicine for a primary diagnosis of Acute hypoxic respiratory failure    Past Medical Hx:   CVA (cerebral vascular accident)    Seizures    HTN (hypertension)    Diabetes mellitus    Deformity      Past Sx:  H/O hernia repair    History of total hip replacement, right    Acute subdural hematoma      Allergies:  Ambien (Other)  Dilantin (Unknown)  phenobarbital (Unknown)    Current Meds:   Standng Meds:  ampicillin/sulbactam  IVPB 3 Gram(s) IV Intermittent every 12 hours  atorvastatin 20 milliGRAM(s) Oral at bedtime  carBAMazepine ER Capsule 200 milliGRAM(s) Oral two times a day  clopidogrel Tablet 75 milliGRAM(s) Oral daily  dexAMETHasone  Injectable 6 milliGRAM(s) IV Push daily  fat emulsion (Fish Oil and Plant Based) 20% Infusion 1.8 Gm/kG/Day (31.3 mL/Hr) IV Continuous <Continuous>  insulin glargine Injectable (LANTUS) 9 Unit(s) SubCutaneous every morning  insulin lispro (ADMELOG) corrective regimen sliding scale   SubCutaneous three times a day before meals  levoFLOXacin IVPB 750 milliGRAM(s) IV Intermittent every 48 hours  metoprolol tartrate 50 milliGRAM(s) Oral two times a day  pantoprazole  Injectable 40 milliGRAM(s) IV Push every 12 hours  Parenteral Nutrition - Adult 1 Each (60 mL/Hr) TPN Continuous <Continuous>  valproate sodium IVPB 250 milliGRAM(s) IV Intermittent every 8 hours    PRN Meds:  acetaminophen   Tablet .. 650 milliGRAM(s) Oral every 6 hours PRN Temp greater or equal to 38C (100.4F), Mild Pain (1 - 3)  morphine  - Injectable 4 milliGRAM(s) IV Push every 3 hours PRN resp distress / agitation    HOME MEDICATIONS:  atorvastatin 20 mg oral tablet: 1 tab(s) orally once a day  carBAMazepine 400 mg oral tablet, extended release:   clopidogrel 75 mg oral tablet: 1 tab(s) orally once a day  divalproex sodium 500 mg oral tablet, extended release:   Januvia 100 mg oral tablet:   lisinopril 5 mg oral tablet: 1 tab(s) orally once a day  metoprolol tartrate 50 mg oral tablet: 1 tab(s) orally 2 times a day      Vital Signs:   T(F): 96.7 (04-02-21 @ 05:00), Max: 96.7 (04-01-21 @ 14:28)  HR: 88 (04-02-21 @ 05:00) (88 - 103)  BP: 125/61 (04-02-21 @ 05:00) (123/56 - 126/60)  RR: 18 (04-02-21 @ 05:00) (18 - 18)  SpO2: 97% (04-02-21 @ 05:00) (95% - 100%)      04-01-21 @ 07:01  -  04-02-21 @ 07:00  --------------------------------------------------------  IN: 0 mL / OUT: 500 mL / NET: -500 mL        Physical Exam:   GENERAL: frail, on bipap, appears comfortable   HEENT: NCAT  CHEST/LUNG: crackles b/l   HEART: Regular rate and rhythm; s1 s2 appreciated, No murmurs, rubs, or gallops  ABDOMEN: Soft, Nontender, Nondistended; Bowel sounds present  EXTREMITIES: No LE edema b/l  SKIN: bluish toes discoloration b/l, multiple area of ecchymosis in b/l ue   NERVOUS SYSTEM:  Alert & Oriented X0  LINES/CATHETERS: iv, allred         Labs:                         7.0    5.95  )-----------( 82       ( 31 Mar 2021 14:15 )             21.7       31 Mar 2021 14:15    151    |  118    |  55     ----------------------------<  280    3.9     |  17     |  1.6      Ca    7.3        31 Mar 2021 14:15  Phos  4.3       31 Mar 2021 14:15  Mg     2.1       31 Mar 2021 16:21    TPro  4.8    /  Alb  1.9    /  TBili  0.7    /  DBili  x      /  AST  34     /  ALT  16     /  AlkPhos  353    31 Mar 2021 14:15

## 2021-04-02 NOTE — DISCHARGE NOTE FOR THE EXPIRED PATIENT - HOSPITAL COURSE
88 yo M with PMHx of HTN, HLD, Epilepsy ( on VPA and carbamazepine) , CVA with R sided deficits presented with acute change in mental status per son. Sun antedates that about two hours prior to arrival, the patient was perfectly fine and discussing stocks with his son. Suddenly became obtunded. The son did not notice any dysarthria, aphasia, or worsened weakness. On arrival pt was febrile to 103 F and hypoxic at 80% on room air and on NRB saturates only 91-92.  Called the house and spoke to the aide who said that the patient did not have any temperate at home and was doing wel until Monday when his po intake decreased. The patient has family visiting him all the time and they went to a dinner in [place] over the weekend as well.     Vital Signs Last 24 Hrs  T(C): 37.8 (16 Mar 2021 01:35), Max: 40.3 (15 Mar 2021 21:40)  T(F): 100 (16 Mar 2021 01:35), Max: 104.5 (15 Mar 2021 21:40)  HR: 70 (16 Mar 2021 01:35) (62 - 85)  BP: 111/56 (16 Mar 2021 01:35) (111/56 - 147/63)  RR: 20 (16 Mar 2021 01:35) (20 - 20)  SpO2: 95% (16 Mar 2021 01:35) (92% - 99%)    Patient was found to be febrile and hypoxic on arrival. COVID swab came out positive. Blood work revealed slight anaemia, elevated lactate, troponin, a positive UA, therapeutic levels of AED. CT head and CTA head and neck did not show any acute stroke or occlusion.  Patient satted wel on 4L NC. Patient is being admitted for stroke w/u and COVID19 Hypoxia.    89 yo M with PMHx of HTN, HLD, Epilepsy ( on VPA and carbamazepine) , CVA with R sided deficits presented with acute change in mental status per son. Sun antedates that about two hours prior to arrival, the patient was perfectly fine and discussing stocks with his son. Suddenly became obtunded. The son did not notice any dysarthria, aphasia, or worsened weakness. On arrival pt was febrile to 103 F and hypoxic at 80% on room air and on NRB saturates only 91-92.  Called the house and spoke to the aide who said that the patient did not have any temperate at home and was doing wel until Monday when his po intake decreased. The patient has family visiting him all the time and they went to a dinner in [place] over the weekend as well.     Vital Signs Last 24 Hrs  T(C): 37.8 (16 Mar 2021 01:35), Max: 40.3 (15 Mar 2021 21:40)  T(F): 100 (16 Mar 2021 01:35), Max: 104.5 (15 Mar 2021 21:40)  HR: 70 (16 Mar 2021 01:35) (62 - 85)  BP: 111/56 (16 Mar 2021 01:35) (111/56 - 147/63)  RR: 20 (16 Mar 2021 01:35) (20 - 20)  SpO2: 95% (16 Mar 2021 01:35) (92% - 99%)    Patient was found to be febrile and hypoxic on arrival. COVID swab came out positive. Blood work revealed slight anaemia, elevated lactate, troponin, a positive UA, therapeutic levels of AED. CT head and CTA head and neck did not show any acute stroke or occlusion.  Patient satted well on 4L NC. Patient is being admitted for stroke w/u and COVID19 Hypoxia.  Had CTH and CTA head and neck which did not show any acute stroke or occlusion.    Pt was eventually transferred to [facility] but was transferred back for worsening hypoxemia on 3/20.     As per transfer note, at 12am vital check patient had SpO2 of 76-78% on 4L NC, oxygen increased to 25LNRB and then titrated down to 15LNRB. Kept patient on 15LNRB for 30mins. After 30mins began to slowly titrate down patient back to 5LNC, however during the process patient appeared more tachypneic and his SpO2 dropped to 89-90%.     During hospitalization pt became       # Acute hypoxic respiratory failure due to COVID PNA  - Remdesivir 3/16 to 3/20  - Dexamethasone 3/16 to 3/25  - currently on bipap, unable to wean off   - Family deciding on bipap removal  - tachypenic and tachycardia overnight 3/26 with associated stomach breathing. EKG shows sinus tach. d-dimer 5k LE duplex negative. CT chest PE protocol negative for PE, however, air broncograms were seen, started unasyn and levaquin    # Acute anemia   - Hb 7.0 <- 5.8 <- 7.5   - CT A/P no retroperitoneal hemorrhage     # Acute pyelonephritis due to Ecoli   - s/p Ceftriaxone 3/16 to 3/19 and Vantin 3/19 to 3/22  - c/w allred     # AMS  - Likely due to metabolic encephalopathy, severe illness. Doubt stroke  - CTH and CTA head and neck did not show any acute stroke or occlusion.  - CTH did show absence of flow related contrast in the left cerebral hemisphere corresponding to a large area of encephalomalacia.   - No EEG, Echo and MR head given poor prognosis/no escalation of care   - C/w Plavix  - C/w Haldol, morphine, valproic acid, carbatrol   - poor po intake, started PPN, per nutrition support   - Palliative c/s appreciated    # hypernatremia and VITA  - likely secondary to poor po intake  - S/p d5 iv hydration. avoid fluid overload  - Cr 1.6, Na 151  - Increase fluids in PPN diet     # Hx of CVA with right hemiparesis/ Seizure d/o    - On Plavix, Statin     # HTN  -C/w lisinopril and metoprolol    # Type II DM  - On insulin lantus + SS  - a1c 7.7,   - endo consult done, monitor fs    89 yo M with PMHx of HTN, HLD, Epilepsy ( on VPA and carbamazepine) , CVA with R sided deficits presented with acute change in mental status per son. Sun antedates that about two hours prior to arrival, the patient was perfectly fine and discussing stocks with his son. Suddenly became obtunded. The son did not notice any dysarthria, aphasia, or worsened weakness. On arrival pt was febrile to 103 F and hypoxic at 80% on room air and on NRB saturates only 91-92.  Called the house and spoke to the aide who said that the patient did not have any temperate at home and was doing wel until Monday when his po intake decreased. The patient has family visiting him all the time and they went to a dinner in [place] over the weekend as well.     Vital Signs Last 24 Hrs  T(C): 37.8 (16 Mar 2021 01:35), Max: 40.3 (15 Mar 2021 21:40)  T(F): 100 (16 Mar 2021 01:35), Max: 104.5 (15 Mar 2021 21:40)  HR: 70 (16 Mar 2021 01:35) (62 - 85)  BP: 111/56 (16 Mar 2021 01:35) (111/56 - 147/63)  RR: 20 (16 Mar 2021 01:35) (20 - 20)  SpO2: 95% (16 Mar 2021 01:35) (92% - 99%)    Patient was found to be febrile and hypoxic on arrival. COVID swab came out positive. Blood work revealed slight anaemia, elevated lactate, troponin, a positive UA, therapeutic levels of AED. CT head and CTA head and neck did not show any acute stroke or occlusion.  Patient satted well on 4L NC. Patient is being admitted for stroke w/u and COVID19 Hypoxia.  Had CTH and CTA head and neck which did not show any acute stroke or occlusion.    Pt was eventually transferred to [facility] but was transferred back for worsening hypoxemia on 3/20.     As per transfer note, at 12am vital check patient had SpO2 of 76-78% on 4L NC, oxygen increased to 25LNRB and then titrated down to 15LNRB. Kept patient on 15LNRB for 30mins. After 30mins began to slowly titrate down patient back to 5LNC, however during the process patient appeared more tachypneic and his SpO2 dropped to 89-90%.     During hospitalization pt received RDV, dexamethasone, but medical condition progressively worsened, and O2 requirements increased. He was  tachypneic with tachycardia overnight 3/26 with associated stomach breathing. EKG shows sinus tach. d-dimer 5k LE duplex negative. CT chest PE protocol negative for PE, however, air broncograms were seen, started Unasyn and Levaquin.      and pt eventually became bipap dependent.     Family             - Family deciding on bipap removal  -     # Acute anemia   - Hb 7.0 <- 5.8 <- 7.5   - CT A/P no retroperitoneal hemorrhage     # Acute pyelonephritis due to Ecoli   - s/p Ceftriaxone 3/16 to 3/19 and Vantin 3/19 to 3/22  - c/w allred     # AMS  - Likely due to metabolic encephalopathy, severe illness. Doubt stroke  - CTH and CTA head and neck did not show any acute stroke or occlusion.  - CTH did show absence of flow related contrast in the left cerebral hemisphere corresponding to a large area of encephalomalacia.   - No EEG, Echo and MR head given poor prognosis/no escalation of care   - C/w Plavix  - C/w Haldol, morphine, valproic acid, carbatrol   - poor po intake, started PPN, per nutrition support   - Palliative c/s appreciated    # hypernatremia and VITA  - likely secondary to poor po intake  - S/p d5 iv hydration. avoid fluid overload  - Cr 1.6, Na 151  - Increase fluids in PPN diet     # Hx of CVA with right hemiparesis/ Seizure d/o    - On Plavix, Statin     # HTN  -C/w lisinopril and metoprolol    # Type II DM  - On insulin lantus + SS  - a1c 7.7,   - endo consult done, monitor fs    88 yo M with PMHx of HTN, HLD, Epilepsy ( on VPA and carbamazepine) , CVA with R sided deficits presented with acute change in mental status per son. Sun antedates that about two hours prior to arrival, the patient was perfectly fine and discussing stocks with his son. Suddenly became obtunded. The son did not notice any dysarthria, aphasia, or worsened weakness. On arrival pt was febrile to 103 F and hypoxic at 80% on room air and on NRB saturates only 91-92.  Called the house and spoke to the aide who said that the patient did not have any temperate at home and was doing wel until Monday when his po intake decreased. The patient has family visiting him all the time and they went to a dinner in [place] over the weekend as well.     Vital Signs Last 24 Hrs  T(C): 37.8 (16 Mar 2021 01:35), Max: 40.3 (15 Mar 2021 21:40)  T(F): 100 (16 Mar 2021 01:35), Max: 104.5 (15 Mar 2021 21:40)  HR: 70 (16 Mar 2021 01:35) (62 - 85)  BP: 111/56 (16 Mar 2021 01:35) (111/56 - 147/63)  RR: 20 (16 Mar 2021 01:35) (20 - 20)  SpO2: 95% (16 Mar 2021 01:35) (92% - 99%)    Patient was found to be febrile and hypoxic on arrival. COVID swab came out positive. Blood work revealed slight anaemia, elevated lactate, troponin, a positive UA, therapeutic levels of AED. CT head and CTA head and neck did not show any acute stroke or occlusion.  Patient satted well on 4L NC. Patient is being admitted for stroke w/u and COVID19 Hypoxia.  Had CTH and CTA head and neck which did not show any acute stroke or occlusion.    Pt was eventually transferred to [facility] but was transferred back for worsening hypoxemia on 3/20.     As per transfer note, at 12am vital check patient had SpO2 of 76-78% on 4L NC, oxygen increased to 25LNRB and then titrated down to 15LNRB. Kept patient on 15LNRB for 30mins. After 30mins began to slowly titrate down patient back to 5LNC, however during the process patient appeared more tachypneic and his SpO2 dropped to 89-90%.     During hospitalization pt received RDV, dexamethasone, but medical condition progressively worsened, and O2 requirements increased. He was  tachypneic with tachycardia overnight 3/26 with associated stomach breathing. EKG shows sinus tach. d-dimer 5k LE duplex negative. CT chest PE protocol negative for PE, however, air broncograms were seen, started Unasyn and Levaquin. Pt eventually became bipap dependent, made DNR/I on 3/25, started morphine pushes PRN and family was deciding about CMO, however, despite optimized medical care and bipap settings pt decompensated today and passed away at 12:38, sons were at bedside, palliative was following.

## 2021-04-02 NOTE — PROGRESS NOTE ADULT - PROVIDER SPECIALTY LIST ADULT
Internal Medicine
Nephrology
Internal Medicine
Nephrology
Palliative Care
Hospitalist
Internal Medicine
Internal Medicine
Neurology
Internal Medicine
Internal Medicine
Palliative Care

## 2021-04-02 NOTE — PROGRESS NOTE ADULT - ASSESSMENT
88 yo M with PMHx of HTN, HLD, Epilepsy (on VPA and carbamazepine) , CVA with R sided deficits presented with acute change in mental status per son. found to be covid positive, then transfered to [facility]    Patient transferred from [facility] for worsening hypoxemia on 3/20.   As per transfer note, at 12am vital check patient had SpO2 of 76-78% on 4L NC, oxygen increased to 25LNRB and then titrated down to 15LNRB. Kept patient on 15LNRB for 30mins. After 30mins began to slowly titrate down patient back to 5LNC, however during the process patient appeared more tachypneic and his SpO2 dropped to 89-90%.   Transferred back to [facility] for adequate management.     NO ESCALATION OF CARE (SEE PALLIATIVE NOTE)    # Acute hypoxic respiratory failure due to COVID PNA  - Remdesivir 3/16 to 3/20  - Dexamethasone 3/16 to 3/25  - currently on bipap, unable to wean off   - Family deciding on bipap removal  - tachypenic and tachycardia overnight 3/26 with associated stomach breathing. EKG shows sinus tach. d-dimer 5k LE duplex negative. CT chest PE protocol negative for PE, however, air broncograms were seen, started unasyn and levaquin    # Acute anemia   - Hb 7.0 <- 5.8 <- 7.5   - CT A/P no retroperitoneal hemorrhage   - F/u repeat lab per family request     # Acute pyelonephritis due to Ecoli   - s/p Ceftriaxone 3/16 to 3/19 and Vantin 3/19 to 3/22  - c/w allred     # AMS  - likely due to metabolic encephalopathy, severe illness. Doubt  stroke  - CTH and CTA head and neck did not show any acute stroke or occlusion.  - CTH did show absence of flow related contrast in the left cerebral hemisphere corresponding to a large area of encephalomalacia.   - No EEG, Echo and MR head given poor prognosis/no escalation of care   - C/w Plavix  - C/w Haldol, morphine, valproic acid, carbatrol   - poor po intake, started PPN yesterday, per nutrition support   - Palliative c/s appreciated    # hypernatremia and VITA  - likely secondary to poor po intake  - S/p d5 iv hydration. avoid fluid overload  - Cr 1.6, Na 151  - Increase fluids in PPN diet     # Hx of CVA with right hemiparesis/ Seizure d/o    - On Plavix, Statin     # HTN  -C/w lisinopril and metoprolol    # Type II DM  - On insulin lantus + SS  - a1c 7.7,   - endo consult done, monitor fs    DVT ppx: none  GI ppx: protonix   Diet: ppn   Activity: IAT  Dispo: acute   code status: dnr/dni, no escalation of care. Family agrees to PPN and labs. Modified MOLST in chart 89 yo M with PMHx of HTN, HLD, Epilepsy (on VPA and carbamazepine) , CVA with R sided deficits presented with acute change in mental status per son. found to be covid positive, then transfered to [facility]    Patient transferred from [facility] for worsening hypoxemia on 3/20.   As per transfer note, at 12am vital check patient had SpO2 of 76-78% on 4L NC, oxygen increased to 25LNRB and then titrated down to 15LNRB. Kept patient on 15LNRB for 30mins. After 30mins began to slowly titrate down patient back to 5LNC, however during the process patient appeared more tachypneic and his SpO2 dropped to 89-90%.   Transferred back to [facility] for adequate management.     NO ESCALATION OF CARE (SEE PALLIATIVE NOTE)    # Acute hypoxic respiratory failure due to COVID PNA  - Remdesivir 3/16 to 3/20  - Dexamethasone 3/16 to 3/25  - currently on bipap, unable to wean off   - Family deciding on bipap removal  - tachypenic and tachycardia overnight 3/26 with associated stomach breathing. EKG shows sinus tach. d-dimer 5k LE duplex negative. CT chest PE protocol negative for PE, however, air broncograms were seen, started unasyn and levaquin    # Acute anemia   - Hb 7.0 <- 5.8 <- 7.5   - CT A/P no retroperitoneal hemorrhage     # Acute pyelonephritis due to Ecoli   - s/p Ceftriaxone 3/16 to 3/19 and Vantin 3/19 to 3/22  - c/w allred     # AMS  - Likely due to metabolic encephalopathy, severe illness. Doubt stroke  - CTH and CTA head and neck did not show any acute stroke or occlusion.  - CTH did show absence of flow related contrast in the left cerebral hemisphere corresponding to a large area of encephalomalacia.   - No EEG, Echo and MR head given poor prognosis/no escalation of care   - C/w Plavix  - C/w Haldol, morphine, valproic acid, carbatrol   - poor po intake, started PPN, per nutrition support   - Palliative c/s appreciated    # hypernatremia and VITA  - likely secondary to poor po intake  - S/p d5 iv hydration. avoid fluid overload  - Cr 1.6, Na 151  - Increase fluids in PPN diet     # Hx of CVA with right hemiparesis/ Seizure d/o    - On Plavix, Statin     # HTN  -C/w lisinopril and metoprolol    # Type II DM  - On insulin lantus + SS  - a1c 7.7,   - endo consult done, monitor fs    DVT ppx: none  GI ppx: protonix   Diet: ppn   Activity: IAT  Dispo: acute   code status: dnr/dni, no escalation of care. Family agrees to PPN and labs. Modified MOLST in chart

## 2021-04-13 DIAGNOSIS — U07.1 COVID-19: ICD-10-CM

## 2021-04-13 DIAGNOSIS — I10 ESSENTIAL (PRIMARY) HYPERTENSION: ICD-10-CM

## 2021-04-13 DIAGNOSIS — N10 ACUTE PYELONEPHRITIS: ICD-10-CM

## 2021-04-13 DIAGNOSIS — G40.909 EPILEPSY, UNSPECIFIED, NOT INTRACTABLE, WITHOUT STATUS EPILEPTICUS: ICD-10-CM

## 2021-04-13 DIAGNOSIS — E87.5 HYPERKALEMIA: ICD-10-CM

## 2021-04-13 DIAGNOSIS — J12.82 PNEUMONIA DUE TO CORONAVIRUS DISEASE 2019: ICD-10-CM

## 2021-04-13 DIAGNOSIS — A41.89 OTHER SPECIFIED SEPSIS: ICD-10-CM

## 2021-04-13 DIAGNOSIS — I69.351 HEMIPLEGIA AND HEMIPARESIS FOLLOWING CEREBRAL INFARCTION AFFECTING RIGHT DOMINANT SIDE: ICD-10-CM

## 2021-04-13 DIAGNOSIS — N17.9 ACUTE KIDNEY FAILURE, UNSPECIFIED: ICD-10-CM

## 2021-04-13 DIAGNOSIS — Z96.641 PRESENCE OF RIGHT ARTIFICIAL HIP JOINT: ICD-10-CM

## 2021-04-13 DIAGNOSIS — E11.649 TYPE 2 DIABETES MELLITUS WITH HYPOGLYCEMIA WITHOUT COMA: ICD-10-CM

## 2021-04-13 DIAGNOSIS — J80 ACUTE RESPIRATORY DISTRESS SYNDROME: ICD-10-CM

## 2021-04-13 DIAGNOSIS — Z66 DO NOT RESUSCITATE: ICD-10-CM

## 2021-04-13 DIAGNOSIS — B96.20 UNSPECIFIED ESCHERICHIA COLI [E. COLI] AS THE CAUSE OF DISEASES CLASSIFIED ELSEWHERE: ICD-10-CM

## 2021-04-13 DIAGNOSIS — G93.41 METABOLIC ENCEPHALOPATHY: ICD-10-CM

## 2021-04-13 DIAGNOSIS — E87.0 HYPEROSMOLALITY AND HYPERNATREMIA: ICD-10-CM

## 2021-04-13 DIAGNOSIS — E87.4 MIXED DISORDER OF ACID-BASE BALANCE: ICD-10-CM

## 2021-04-13 DIAGNOSIS — E43 UNSPECIFIED SEVERE PROTEIN-CALORIE MALNUTRITION: ICD-10-CM

## 2021-04-13 DIAGNOSIS — Z79.84 LONG TERM (CURRENT) USE OF ORAL HYPOGLYCEMIC DRUGS: ICD-10-CM

## 2021-04-13 DIAGNOSIS — Z51.5 ENCOUNTER FOR PALLIATIVE CARE: ICD-10-CM

## 2021-04-13 DIAGNOSIS — D64.9 ANEMIA, UNSPECIFIED: ICD-10-CM

## 2022-01-17 NOTE — OCCUPATIONAL THERAPY INITIAL EVALUATION ADULT - HOME MANAGEMENT SKILLS, PREVIOUS LEVEL OF FUNCTION, OT EVAL
Called patient and moved up his appointment to 1/19/22 per Dr Gordon request.  Patient verbalized understanding of new appointment instructions.  
needed assist

## 2023-01-07 NOTE — CONSULT NOTE ADULT - ASSESSMENT
1/7 pa started on synjardy       Available without authorization.    89 yo M with PMHx of HTN, HLD, Epilepsy ( on VPA and carbamazepine) , CVA with R sided deficits presented with acute change in mental status per son. Son states that about two hours prior to arrival, the patient was perfectly fine and discussing stocks with his son. Suddenly became obtunded. The son did not notice any dysarthria, aphasia, or worsened weakness. On arrival pt was febrile to 103 F and hypoxic at 80% on room air and on NRB saturates only 91-92.  Called the house and spoke to the aide who said that the patient did not have any temperate at home and was doing well until Monday when his po intake decreased. The patient has family visiting him all the time and they went to a dinner in [place] over the weekend as well.     IMPRESSION;  No evidence of a CNS infection. Presently alert, does respond with no meningeal signs  Acute change in mentation probably related to sepsis secondary to acute pyelo with metabolic encephalopathy  BCx 3/15 NG  UCx p  COVID 19 with moderate illness.   Timel;ine of infection unclear  CXR with no significant opacities    RECOMMENDATIONS;  Target SpO2 92 % to 96 %.rolf  Dexamethasone 6 mg iv q24h for 10 days.  Monitor for side effects: hyperglycemia, neurological ( agitation/confusion), adrenal suppression, bacterial and fungal infections  Rocephin 1 gm ivq24h  UCx  COVID-19 antibodies

## 2023-04-11 NOTE — PATIENT PROFILE ADULT - NSPROPTRIGHTNOTIFY_GEN_A_NUR
Sertraline (Zoloft) 50 MG tablet   Last Written Prescription Date: 02/28/2023  Last Fill Quantity: 90,   # refills: 1  Last Office Visit: 02/28/2023     declines

## 2023-09-24 NOTE — PHYSICAL THERAPY INITIAL EVALUATION ADULT - DISCHARGE DISPOSITION, PT EVAL
ED Provider Note    CHIEF COMPLAINT  Chief Complaint   Patient presents with    Blood Pressure Problem       EXTERNAL RECORDS REVIEWED  Reviewed outpatient clinic visits medication list outpatient management of hypertension    HPI/ROS  LIMITATION TO HISTORY   None  OUTSIDE HISTORIAN(S):  None    Gabriela Carrillo is a 55 y.o. female who presents for evaluation of elevated blood pressure.  Patient has a known history of hypertension.  She admits that she has not been compliant with her diet and exercise regimen.  She is on hydrochlorothiazide as well as an angiotensin receptor blocker.  She reports compliance with these medications.  She reports dull mild headache but no thunderclap headache.  She does report feeling mildly short of breath but no chest pain.  No associated leg swelling.    PAST MEDICAL HISTORY   has a past medical history of Bilateral ovarian cysts, Hypertension, Murmur, cardiac, and Osteopenia.    SURGICAL HISTORY   has a past surgical history that includes gyn laparoscopy (2020) and open intern fix anter pelv ring fx (Right, 2/7/2023).    FAMILY HISTORY  Family History   Problem Relation Age of Onset    Cancer Mother         Lung    Lung Disease Mother     Heart Disease Mother     Hypertension Mother     Cancer Father     Lung Disease Father     Heart Disease Father     Hypertension Father     Heart Disease Brother     Psychiatric Illness Brother     Neuromuscular disorder Brother        SOCIAL HISTORY  Social History     Tobacco Use    Smoking status: Never    Smokeless tobacco: Never   Vaping Use    Vaping Use: Never used   Substance and Sexual Activity    Alcohol use: Yes     Alcohol/week: 1.2 oz     Types: 2 Cans of beer per week     Comment: 2-4 a week    Drug use: Never    Sexual activity: Not on file       CURRENT MEDICATIONS  Home Medications       Reviewed by Tamy Ochoa R.N. (Registered Nurse) on 09/24/23 at 0924  Med List Status: Partial     Medication Last Dose Status    acetaminophen (TYLENOL) 500 MG Tab  Active   alendronate (FOSAMAX) 70 MG Tab  Active   Amoxicillin 500 MG Tab  Active   Ascorbic Acid (VITAMIN C) 1000 MG Tab  Active   Calcium Carbonate-Vit D-Min (CALCIUM 1200 PO)  Active   Cyanocobalamin (VITAMIN B 12 PO)  Active   escitalopram (LEXAPRO) 10 MG Tab  Active   FLOWFLEX COVID-19 AG HOME TEST Kit  Active   hydrochlorothiazide (MICROZIDE) 12.5 MG capsule  Active   ibuprofen (MOTRIN) 800 MG Tab  Active   olmesartan (BENICAR) 20 MG Tab  Active                    ALLERGIES  No Known Allergies    PHYSICAL EXAM  VITAL SIGNS: BP (!) 176/100   Pulse 68   Temp 36.7 °C (98 °F) (Temporal)   Resp 16   Wt 77.3 kg (170 lb 6.7 oz)   SpO2 94%   BMI 33.28 kg/m²    Pulse ox interpretation: I interpret this pulse ox as normal.  Constitutional: Alert and oriented x 3, no Distress  HEENT: Atraumatic normocephalic, pupils are equal round reactive to light extraocular movements are intact. The nares is clear, external ears are normal, mouth shows moist mucous membranes normal dentition for age  Neck: Supple, no JVD no tracheal deviation  Cardiovascular: Regular rate and rhythm no murmur rub or gallop 2+ pulses peripherally x4  Thorax & Lungs: No respiratory distress, no wheezes rales or rhonchi, No chest tenderness.   GI: Soft nontender nondistended positive bowel sounds, no peritoneal signs  Skin: Warm dry no acute rash or lesion  Musculoskeletal: Moving all extremities with full range and 5 of 5 strength no acute  deformity no pedal edema  Neurologic: Cranial nerves III through XII are grossly intact no sensory deficit no cerebellar dysfunction GCS 15 NIH stroke scale score of 0  Psychiatric: Appropriate affect for situation at this time          DIAGNOSTIC STUDIES / PROCEDURES    LABS  Results for orders placed or performed during the hospital encounter of 09/24/23   CBC WITH DIFFERENTIAL   Result Value Ref Range    WBC 6.8 4.8 - 10.8 K/uL    RBC 4.09 (L) 4.20 - 5.40 M/uL     home w/ assist/home w/ home PT Hemoglobin 13.7 12.0 - 16.0 g/dL    Hematocrit 39.0 37.0 - 47.0 %    MCV 95.4 81.4 - 97.8 fL    MCH 33.5 (H) 27.0 - 33.0 pg    MCHC 35.1 32.2 - 35.5 g/dL    RDW 46.3 35.9 - 50.0 fL    Platelet Count 255 164 - 446 K/uL    MPV 8.5 (L) 9.0 - 12.9 fL    Neutrophils-Polys 63.50 44.00 - 72.00 %    Lymphocytes 25.30 22.00 - 41.00 %    Monocytes 8.30 0.00 - 13.40 %    Eosinophils 1.90 0.00 - 6.90 %    Basophils 0.70 0.00 - 1.80 %    Immature Granulocytes 0.30 0.00 - 0.90 %    Nucleated RBC 0.00 0.00 - 0.20 /100 WBC    Neutrophils (Absolute) 4.29 1.82 - 7.42 K/uL    Lymphs (Absolute) 1.71 1.00 - 4.80 K/uL    Monos (Absolute) 0.56 0.00 - 0.85 K/uL    Eos (Absolute) 0.13 0.00 - 0.51 K/uL    Baso (Absolute) 0.05 0.00 - 0.12 K/uL    Immature Granulocytes (abs) 0.02 0.00 - 0.11 K/uL    NRBC (Absolute) 0.00 K/uL   Comp Metabolic Panel   Result Value Ref Range    Sodium 138 135 - 145 mmol/L    Potassium 3.4 (L) 3.6 - 5.5 mmol/L    Chloride 101 96 - 112 mmol/L    Co2 23 20 - 33 mmol/L    Anion Gap 14.0 7.0 - 16.0    Glucose 102 (H) 65 - 99 mg/dL    Bun 12 8 - 22 mg/dL    Creatinine 0.79 0.50 - 1.40 mg/dL    Calcium 8.8 8.5 - 10.5 mg/dL    Correct Calcium 8.4 (L) 8.5 - 10.5 mg/dL    AST(SGOT) 35 12 - 45 U/L    ALT(SGPT) 36 2 - 50 U/L    Alkaline Phosphatase 88 30 - 99 U/L    Total Bilirubin 0.8 0.1 - 1.5 mg/dL    Albumin 4.5 3.2 - 4.9 g/dL    Total Protein 7.6 6.0 - 8.2 g/dL    Globulin 3.1 1.9 - 3.5 g/dL    A-G Ratio 1.5 g/dL   TROPONIN   Result Value Ref Range    Troponin T 8 6 - 19 ng/L   ESTIMATED GFR   Result Value Ref Range    GFR (CKD-EPI) 88 >60 mL/min/1.73 m 2   EKG   Result Value Ref Range    Report       [facility] Emergency Dept.    Test Date:  2023  Pt Name:    NADIRA ERNANDEZ                Department: ER  MRN:        [id number]                      Room:       [facility]  Gender:     Female                       Technician:  :        [id number]                   Requested By:SRIDHAR CLAY  Order #:     [id number]                    Reading MD:    Measurements  Intervals                                Axis  Rate:       63                           P:          -8  TX:         126                          QRS:        11  QRSD:       83                           T:          15  QT:         488  QTc:        500    Interpretive Statements  Sinus rhythm  Borderline prolonged QT interval  Compared to ECG 02/07/2023 08:33:07  T-wave abnormality no longer present        EKG twelve-lead interpretation by me rate 63 sinus rhythm no acute ST segment elevation or depression no pathological T wave inversions.  Intervals and axis are normal.  No suggestion of heart block arrhythmia or ischemia  RADIOLOGY  I have independently interpreted the diagnostic imaging associated with this visit and am waiting the final reading from the radiologist.   My preliminary interpretation is as follows: No evidence of acute heart failure  Radiologist interpretation:   DX-CHEST-PORTABLE (1 VIEW)   Final Result      No acute cardiopulmonary abnormality.             COURSE & MEDICAL DECISION MAKING    ED Observation Status? Yes; I am placing the patient in to an observation status due to a diagnostic uncertainty as well as therapeutic intensity. Patient placed in observation status at 9 AM, 9/24/2023.     Observation plan is as follows: Establish an IV, monitor on continuous pulse oximetry and cardiac monitoring.  Administer parenteral antihypertensives.  Perform extensive hypertensive urgency/emergency work-up.  Reevaluate patient    Upon Reevaluation, the patient's condition has: Improved; and will be discharged.    Patient discharged from ED Observation status at 1105 (Time) 9/24 (Date).     INITIAL ASSESSMENT, COURSE AND PLAN  Care Narrative:    This is a very pleasant 55-year-old female presents here with profoundly elevated blood pressure but no high risk or concerning features such as severe or thunderclap headache abnormal neurological or  complaints, chest pain shortness of breath or evidence or symptoms of heart failure.  Patient has been prescribed 2 different types of antihypertensives but only been taking one of them recently.  She arrives here with a profoundly elevated blood pressure of 200/100.  She had a normal neurological exam no elevation of troponin or ischemia on EKG.  Laboratory studies did not demonstrate any evidence of acute kidney injury and chest x-ray did not demonstrate any evidence of acute heart failure.  IV was established and she was given a dose of IV labetalol which brought her blood pressure down around 25%.  I counseled her is okay to take both of her antihypertensives.  She has follow-up with her PCP regarding long-term hypertension management.  Return precautions were reviewed      ADDITIONAL PROBLEM LIST    DISPOSITION AND DISCUSSIONS  I have discussed management of the patient with the following physicians and MENDOZA's: None    Discussion of management with other QHP or appropriate source(s): None    Escalation of care considered, and ultimately not performed:acute inpatient care management, however at this time, the patient is most appropriate for outpatient management    Barriers to care at this time, including but not limited to: None    Decision tools and prescription drugs considered including, but not limited to: None    FINAL DIAGNOSIS  1. Hypertensive urgency           Electronically signed by: Oscar Key M.D., 9/24/2023 10:04 AM

## 2023-10-03 NOTE — PHYSICAL THERAPY INITIAL EVALUATION ADULT - STRENGTHENING, PT EVAL
"-- The patient will improve muscle strength to 1/2 grade to improve mobility.

## 2024-01-18 NOTE — ED PROVIDER NOTE - BIRTH SEX
Please let patient know biopsies from the colonoscopy showed hyperplastic or non precancerous polyps, which do not tend to put at increased risk for colon cancer.  We can plan to repeat colonoscopy in 10 years or so, sooner if symptoms of concern were to develop.    Please update HM     Male
